# Patient Record
Sex: MALE | Race: BLACK OR AFRICAN AMERICAN | NOT HISPANIC OR LATINO | Employment: OTHER | URBAN - METROPOLITAN AREA
[De-identification: names, ages, dates, MRNs, and addresses within clinical notes are randomized per-mention and may not be internally consistent; named-entity substitution may affect disease eponyms.]

---

## 2020-11-19 ENCOUNTER — HOSPITAL ENCOUNTER (OUTPATIENT)
Facility: OTHER | Age: 80
Discharge: HOME | End: 2020-11-21
Attending: EMERGENCY MEDICINE | Admitting: HOSPITALIST
Payer: MEDICARE

## 2020-11-19 DIAGNOSIS — R29.6 FREQUENT FALLS: ICD-10-CM

## 2020-11-19 DIAGNOSIS — I10 ESSENTIAL HYPERTENSION: ICD-10-CM

## 2020-11-19 DIAGNOSIS — R54 AGE-RELATED PHYSICAL DEBILITY: ICD-10-CM

## 2020-11-19 DIAGNOSIS — I24.9 ACS (ACUTE CORONARY SYNDROME): ICD-10-CM

## 2020-11-19 DIAGNOSIS — R79.89 ELEVATED TROPONIN: ICD-10-CM

## 2020-11-19 DIAGNOSIS — R42 DIZZINESS: Primary | ICD-10-CM

## 2020-11-19 LAB
ANION GAP SERPL CALC-SCNC: 8 MMOL/L (ref 8–16)
BASOPHILS # BLD AUTO: 0.07 K/UL (ref 0–0.2)
BASOPHILS NFR BLD: 0.7 % (ref 0–1.9)
BUN SERPL-MCNC: 13 MG/DL (ref 8–23)
CALCIUM SERPL-MCNC: 9.4 MG/DL (ref 8.7–10.5)
CHLORIDE SERPL-SCNC: 105 MMOL/L (ref 95–110)
CO2 SERPL-SCNC: 29 MMOL/L (ref 23–29)
CREAT SERPL-MCNC: 1 MG/DL (ref 0.5–1.4)
CTP QC/QA: YES
DIFFERENTIAL METHOD: ABNORMAL
EOSINOPHIL # BLD AUTO: 0 K/UL (ref 0–0.5)
EOSINOPHIL NFR BLD: 0.3 % (ref 0–8)
ERYTHROCYTE [DISTWIDTH] IN BLOOD BY AUTOMATED COUNT: 12.2 % (ref 11.5–14.5)
EST. GFR  (AFRICAN AMERICAN): >60 ML/MIN/1.73 M^2
EST. GFR  (NON AFRICAN AMERICAN): >60 ML/MIN/1.73 M^2
GLUCOSE SERPL-MCNC: 189 MG/DL (ref 70–110)
HCT VFR BLD AUTO: 41.7 % (ref 40–54)
HGB BLD-MCNC: 13.4 G/DL (ref 14–18)
IMM GRANULOCYTES # BLD AUTO: 0.03 K/UL (ref 0–0.04)
IMM GRANULOCYTES NFR BLD AUTO: 0.3 % (ref 0–0.5)
LYMPHOCYTES # BLD AUTO: 1.6 K/UL (ref 1–4.8)
LYMPHOCYTES NFR BLD: 15.7 % (ref 18–48)
MCH RBC QN AUTO: 29.6 PG (ref 27–31)
MCHC RBC AUTO-ENTMCNC: 32.1 G/DL (ref 32–36)
MCV RBC AUTO: 92 FL (ref 82–98)
MONOCYTES # BLD AUTO: 0.9 K/UL (ref 0.3–1)
MONOCYTES NFR BLD: 9.1 % (ref 4–15)
NEUTROPHILS # BLD AUTO: 7.6 K/UL (ref 1.8–7.7)
NEUTROPHILS NFR BLD: 73.9 % (ref 38–73)
NRBC BLD-RTO: 0 /100 WBC
PLATELET # BLD AUTO: 323 K/UL (ref 150–350)
PMV BLD AUTO: 10.3 FL (ref 9.2–12.9)
POTASSIUM SERPL-SCNC: 3.7 MMOL/L (ref 3.5–5.1)
RBC # BLD AUTO: 4.53 M/UL (ref 4.6–6.2)
SARS-COV-2 RDRP RESP QL NAA+PROBE: NEGATIVE
SODIUM SERPL-SCNC: 142 MMOL/L (ref 136–145)
T4 FREE SERPL-MCNC: 0.92 NG/DL (ref 0.71–1.51)
TROPONIN I SERPL DL<=0.01 NG/ML-MCNC: 0.05 NG/ML (ref 0–0.03)
TSH SERPL DL<=0.005 MIU/L-ACNC: 0.34 UIU/ML (ref 0.4–4)
WBC # BLD AUTO: 10.31 K/UL (ref 3.9–12.7)

## 2020-11-19 PROCEDURE — 84439 ASSAY OF FREE THYROXINE: CPT

## 2020-11-19 PROCEDURE — 84484 ASSAY OF TROPONIN QUANT: CPT

## 2020-11-19 PROCEDURE — 93005 ELECTROCARDIOGRAM TRACING: CPT

## 2020-11-19 PROCEDURE — 93010 EKG 12-LEAD: ICD-10-PCS | Mod: ,,, | Performed by: INTERNAL MEDICINE

## 2020-11-19 PROCEDURE — 99220 PR INITIAL OBSERVATION CARE,LEVL III: ICD-10-PCS | Mod: ,,, | Performed by: NURSE PRACTITIONER

## 2020-11-19 PROCEDURE — 93010 ELECTROCARDIOGRAM REPORT: CPT | Mod: ,,, | Performed by: INTERNAL MEDICINE

## 2020-11-19 PROCEDURE — U0002 COVID-19 LAB TEST NON-CDC: HCPCS | Performed by: EMERGENCY MEDICINE

## 2020-11-19 PROCEDURE — 84443 ASSAY THYROID STIM HORMONE: CPT

## 2020-11-19 PROCEDURE — 99285 EMERGENCY DEPT VISIT HI MDM: CPT | Mod: 25

## 2020-11-19 PROCEDURE — G0378 HOSPITAL OBSERVATION PER HR: HCPCS

## 2020-11-19 PROCEDURE — 85025 COMPLETE CBC W/AUTO DIFF WBC: CPT

## 2020-11-19 PROCEDURE — 80048 BASIC METABOLIC PNL TOTAL CA: CPT

## 2020-11-19 PROCEDURE — 99220 PR INITIAL OBSERVATION CARE,LEVL III: CPT | Mod: ,,, | Performed by: NURSE PRACTITIONER

## 2020-11-19 PROCEDURE — 94761 N-INVAS EAR/PLS OXIMETRY MLT: CPT

## 2020-11-19 RX ORDER — SODIUM CHLORIDE 0.9 % (FLUSH) 0.9 %
10 SYRINGE (ML) INJECTION
Status: DISCONTINUED | OUTPATIENT
Start: 2020-11-19 | End: 2020-11-21 | Stop reason: HOSPADM

## 2020-11-19 RX ORDER — TALC
6 POWDER (GRAM) TOPICAL NIGHTLY PRN
Status: DISCONTINUED | OUTPATIENT
Start: 2020-11-19 | End: 2020-11-21 | Stop reason: HOSPADM

## 2020-11-19 RX ORDER — ACETAMINOPHEN 325 MG/1
650 TABLET ORAL EVERY 4 HOURS PRN
Status: DISCONTINUED | OUTPATIENT
Start: 2020-11-19 | End: 2020-11-21 | Stop reason: HOSPADM

## 2020-11-19 RX ORDER — ONDANSETRON 8 MG/1
8 TABLET, ORALLY DISINTEGRATING ORAL EVERY 8 HOURS PRN
Status: DISCONTINUED | OUTPATIENT
Start: 2020-11-19 | End: 2020-11-21 | Stop reason: HOSPADM

## 2020-11-19 RX ORDER — HEPARIN SODIUM 5000 [USP'U]/ML
5000 INJECTION, SOLUTION INTRAVENOUS; SUBCUTANEOUS EVERY 8 HOURS
Status: DISCONTINUED | OUTPATIENT
Start: 2020-11-19 | End: 2020-11-20

## 2020-11-19 RX ORDER — SODIUM CHLORIDE 0.9 % (FLUSH) 0.9 %
10 SYRINGE (ML) INJECTION
Status: DISCONTINUED | OUTPATIENT
Start: 2020-11-19 | End: 2020-11-20

## 2020-11-19 NOTE — ED TRIAGE NOTES
Pt to ed c/o dizziness that has been constant for the last few weeks. He denies taking any recent new medications. Pt denies any n/v/d, fever nor chills. Pt AAOx4, resp pattern even and non labored.

## 2020-11-19 NOTE — ED NOTES
13:48 lying 154/77 B/P  20 Resp  80 Pulse    13:50  Sitting  143/70  20 Resp     80 pulse    13:52 standing   150/76  12 resp   82 pulse

## 2020-11-19 NOTE — ED PROVIDER NOTES
"Encounter Date: 11/19/2020    SCRIBE #1 NOTE: I, Christian Naranjo, am scribing for, and in the presence of, Dr. Townsend.       History     Chief Complaint   Patient presents with    Dizziness     +intermittent dizziness x1 month. family members report frequent falls. pt reports right rib pain and "heart racing at times" pt denies fever, chills,n/v/d, sob, cp      Time seen by provider: 2:20 PM    This is a 80 y.o. male who presents with dizziness. Pt reports for over 3 weeks he has been intermittently experiencing dizziness when standing from a seated position. He also notes multiple episodes of feeling like his heart is racing. Per his granddaughter, he has had several falls over the last 2 to 3 weeks due to his dizzy spells with one fall causing rib trauma.. He states he finally came to the ED today because his daughter insisted that he be evaluated for his symptoms. Pt says he does not require walking assistance and ambulates well with occasional moments of feeling off-balance. He denies HA, CP, SOB, or N/V. Denies any medical history. Pt reports he has been eating less over the last few weeks due to dental pain. He has no other complaints today.    The history is provided by the patient, medical records and a relative.     Review of patient's allergies indicates:  No Known Allergies  No past medical history on file.  No past surgical history on file.  No family history on file.  Social History     Tobacco Use    Smoking status: Not on file   Substance Use Topics    Alcohol use: Not on file    Drug use: Not on file     Review of Systems   Constitutional: Negative for chills and fever.   HENT: Negative for congestion and sore throat.    Eyes: Negative for visual disturbance.   Respiratory: Negative for cough and shortness of breath.    Cardiovascular: Positive for palpitations. Negative for chest pain.   Gastrointestinal: Negative for nausea and vomiting.   Genitourinary: Negative for decreased urine volume, " dysuria and frequency.   Musculoskeletal: Negative for neck pain.   Skin: Negative for rash and wound.   Neurological: Positive for dizziness. Negative for weakness, numbness and headaches.   Psychiatric/Behavioral: Negative for behavioral problems and confusion.       Physical Exam     Initial Vitals [11/19/20 1330]   BP Pulse Resp Temp SpO2   132/79 93 18 98.3 °F (36.8 °C) 97 %      MAP       --         Physical Exam    Nursing note and vitals reviewed.  Constitutional: He appears well-developed and well-nourished. No distress.   HENT:   Head: Normocephalic and atraumatic.   Right Ear: External ear normal.   Left Ear: External ear normal.   Eyes: Conjunctivae and EOM are normal. Right eye exhibits no discharge. Left eye exhibits no discharge.   Neck: Normal range of motion.   Cardiovascular: Normal rate, regular rhythm and normal heart sounds. Exam reveals no gallop and no friction rub.    No murmur heard.  Pulmonary/Chest: Effort normal and breath sounds normal. No respiratory distress. He has no wheezes. He has no rales.   Abdominal: Normal appearance.   Neurological: He is alert and oriented to person, place, and time. He has normal strength. No cranial nerve deficit or sensory deficit. He displays a negative Romberg sign. Coordination and gait normal.   Skin: Skin is warm and dry. No rash noted. No pallor.   Psychiatric: He has a normal mood and affect. His behavior is normal. Thought content normal.         ED Course   Procedures  Labs Reviewed   BASIC METABOLIC PANEL - Abnormal; Notable for the following components:       Result Value    Glucose 189 (*)     All other components within normal limits   CBC W/ AUTO DIFFERENTIAL - Abnormal; Notable for the following components:    RBC 4.53 (*)     Hemoglobin 13.4 (*)     Gran % 73.9 (*)     Lymph % 15.7 (*)     All other components within normal limits   TROPONIN I - Abnormal; Notable for the following components:    Troponin I 0.049 (*)     All other components  within normal limits   TSH - Abnormal; Notable for the following components:    TSH 0.345 (*)     All other components within normal limits   T4, FREE   SARS-COV-2 RDRP GENE     EKG Readings: (Independently Interpreted)   Rhythm: Normal Sinus Rhythm. Heart Rate: 83.   Normal interval. Narrow QRS. T-wave inversion in 1 and Avl. Lateral ischemic findings. No other ST/T wave abnormalities.       Imaging Results          X-Ray Chest AP Portable (Final result)  Result time 11/19/20 18:12:59    Final result by Meir Alberto MD (11/19/20 18:12:59)                 Impression:      Elevation of the left hemidiaphragm with mild left basilar atelectasis.  Otherwise no acute cardiopulmonary process identified.      Electronically signed by: Meir Alberto MD  Date:    11/19/2020  Time:    18:12             Narrative:    EXAMINATION:  XR CHEST AP PORTABLE    CLINICAL HISTORY:  Dizziness and giddiness    TECHNIQUE:  Single frontal view of the chest was performed.    COMPARISON:  None    FINDINGS:  Cardiac silhouette is normal in size.  There is elevation of the left hemidiaphragm with mild left basilar atelectasis.  Right lung is expanded.  No evidence of focal consolidative process, pneumothorax, or significant pleural effusion.  No acute osseous abnormality identified.                               MRI Brain Without Contrast (Final result)  Result time 11/19/20 18:10:40    Final result by Meir Alberto MD (11/19/20 18:10:40)                 Impression:      1. No acute intracranial abnormalities identified.  No evidence of acute infarction.  2. Generalized cerebral volume loss with extensive chronic white matter disease.  3. Scattered foci of gradient susceptibility which may reflect remote microhemorrhage or amyloid angiopathy.      Electronically signed by: Meir Alberto MD  Date:    11/19/2020  Time:    18:10             Narrative:    EXAMINATION:  MRI BRAIN WITHOUT CONTRAST    CLINICAL HISTORY:  Dizziness,  persistent/recurrent, cardiac or vascular cause suspected;    TECHNIQUE:  Multiplanar multisequence MR imaging of the brain was performed without contrast.    COMPARISON:  MRI brain from July 2012.    FINDINGS:  There is generalized cerebral volume loss with extensive chronic white matter disease.  No diffusion signal abnormality to suggest acute infarction.  There is stable prominence of the ventricular system which may be secondary to compensatory enlargement from degree of volume loss.  No acute intracranial hemorrhage or extra-axial fluid collection seen.  Scattered foci of gradient susceptibility are seen which may reflect remote microhemorrhage or amyloid angiopathy.  No mass or mass effect.  Flow voids are normal in appearance.    Visualized paranasal sinuses and mastoid air cells are clear.  Orbits appear normal.                                 Medical Decision Making:   History:   Old Medical Records: I decided to obtain old medical records.  Initial Assessment:   80-year-old male with no past medical history presents with intermittent dizziness over last 3-4 weeks.  Differential could include peripheral versus central vertigo.  Also acute coronary syndrome, acute kidney injury, electrolyte abnormalities.  He currently is asymptomatic.  No focal neurological deficits.  Will send for an MRI based on age and get labs and observed.  Independently Interpreted Test(s):   I have ordered and independently interpreted EKG Reading(s) - see prior notes  Clinical Tests:   Lab Tests: Ordered and Reviewed  Radiological Study: Ordered and Reviewed  Medical Tests: Ordered and Reviewed            Scribe Attestation:   Scribe #1: I performed the above scribed service and the documentation accurately describes the services I performed. I attest to the accuracy of the note.    Attending Attestation:           Physician Attestation for Scribe:  Physician Attestation Statement for Scribe #1: I, Dr. Townsend, reviewed  documentation, as scribed by Christian Naranjo in my presence, and it is both accurate and complete.                 ED Course as of Nov 19 1908   Thu Nov 19, 2020   1449 No significant abnormalities.   CBC auto differential(!) [SM]   1516 Elevation of his troponin.   Troponin I(!): 0.049 [SM]   1518 No significant abnormalities     Basic metabolic panel(!) [SM]   1544 Normal   Free T4: 0.92 [SM]   1823 MRI reviewed showing no acute abnormality.  Will discuss with Hospital Medicine for further observation for evaluation of acute coronary syndrome.    []   1840 I discussed the results with the patient as well as his daughter was at bedside.  Also discussed my recommendation of admission for observation.  They agree with plan of care.  I spoke with Hospital Medicine and will admit to Dr. Montero.    This is the extent to the patients complaints today here in the emergency department.    []      ED Course User Index  [SM] Reginald Townsend DO            Clinical Impression:       ICD-10-CM ICD-9-CM   1. ACS (acute coronary syndrome)  I24.9 411.1   2. Dizziness  R42 780.4                          ED Disposition Condition    Observation                             Reginald Townsend DO  11/19/20 1908

## 2020-11-20 PROBLEM — I10 ESSENTIAL HYPERTENSION: Status: ACTIVE | Noted: 2020-11-20

## 2020-11-20 PROBLEM — R54 AGE-RELATED PHYSICAL DEBILITY: Status: ACTIVE | Noted: 2020-11-20

## 2020-11-20 PROBLEM — R29.6 FREQUENT FALLS: Status: ACTIVE | Noted: 2020-11-20

## 2020-11-20 LAB
25(OH)D3+25(OH)D2 SERPL-MCNC: 26 NG/ML (ref 30–96)
ALBUMIN SERPL BCP-MCNC: 3.5 G/DL (ref 3.5–5.2)
ALP SERPL-CCNC: 69 U/L (ref 55–135)
ALT SERPL W/O P-5'-P-CCNC: 10 U/L (ref 10–44)
ANION GAP SERPL CALC-SCNC: 6 MMOL/L (ref 8–16)
ASCENDING AORTA: 2.97 CM
AST SERPL-CCNC: 21 U/L (ref 10–40)
AV INDEX (PROSTH): 0.73
AV MEAN GRADIENT: 5 MMHG
AV PEAK GRADIENT: 9 MMHG
AV REGURGITATION PRESSURE HALF TIME: 915 MS
AV VALVE AREA: 2.91 CM2
AV VELOCITY RATIO: 0.64
BASOPHILS # BLD AUTO: 0.06 K/UL (ref 0–0.2)
BASOPHILS NFR BLD: 0.6 % (ref 0–1.9)
BILIRUB SERPL-MCNC: 0.9 MG/DL (ref 0.1–1)
BNP SERPL-MCNC: 290 PG/ML (ref 0–99)
BSA FOR ECHO PROCEDURE: 1.79 M2
BUN SERPL-MCNC: 10 MG/DL (ref 8–23)
CALCIUM SERPL-MCNC: 8.8 MG/DL (ref 8.7–10.5)
CHLORIDE SERPL-SCNC: 105 MMOL/L (ref 95–110)
CO2 SERPL-SCNC: 32 MMOL/L (ref 23–29)
CREAT SERPL-MCNC: 0.8 MG/DL (ref 0.5–1.4)
CV ECHO LV RWT: 0.54 CM
DIFFERENTIAL METHOD: ABNORMAL
DOP CALC AO PEAK VEL: 1.49 M/S
DOP CALC AO VTI: 26.97 CM
DOP CALC LVOT AREA: 4 CM2
DOP CALC LVOT DIAMETER: 2.26 CM
DOP CALC LVOT PEAK VEL: 0.95 M/S
DOP CALC LVOT STROKE VOLUME: 78.43 CM3
DOP CALCLVOT PEAK VEL VTI: 19.56 CM
E WAVE DECELERATION TIME: 296.44 MSEC
E/A RATIO: 0.76
E/E' RATIO: 20.75 M/S
ECHO LV POSTERIOR WALL: 1.26 CM (ref 0.6–1.1)
EOSINOPHIL # BLD AUTO: 0.1 K/UL (ref 0–0.5)
EOSINOPHIL NFR BLD: 1.5 % (ref 0–8)
ERYTHROCYTE [DISTWIDTH] IN BLOOD BY AUTOMATED COUNT: 12 % (ref 11.5–14.5)
EST. GFR  (AFRICAN AMERICAN): >60 ML/MIN/1.73 M^2
EST. GFR  (NON AFRICAN AMERICAN): >60 ML/MIN/1.73 M^2
ESTIMATED AVG GLUCOSE: 88 MG/DL (ref 68–131)
FOLATE SERPL-MCNC: 14 NG/ML (ref 4–24)
FRACTIONAL SHORTENING: 23 % (ref 28–44)
GLUCOSE SERPL-MCNC: 104 MG/DL (ref 70–110)
HBA1C MFR BLD HPLC: 4.7 % (ref 4–5.6)
HCT VFR BLD AUTO: 40.2 % (ref 40–54)
HGB BLD-MCNC: 12.8 G/DL (ref 14–18)
IMM GRANULOCYTES # BLD AUTO: 0.01 K/UL (ref 0–0.04)
IMM GRANULOCYTES NFR BLD AUTO: 0.1 % (ref 0–0.5)
INTERVENTRICULAR SEPTUM: 1.28 CM (ref 0.6–1.1)
IVRT: 140.48 MSEC
LA MAJOR: 5.22 CM
LA MINOR: 5.4 CM
LA WIDTH: 4.07 CM
LEFT ATRIUM SIZE: 3.67 CM
LEFT ATRIUM VOLUME INDEX MOD: 41.6 ML/M2
LEFT ATRIUM VOLUME INDEX: 37.9 ML/M2
LEFT ATRIUM VOLUME MOD: 74 CM3
LEFT ATRIUM VOLUME: 67.4 CM3
LEFT INTERNAL DIMENSION IN SYSTOLE: 3.62 CM (ref 2.1–4)
LEFT VENTRICLE DIASTOLIC VOLUME INDEX: 57.78 ML/M2
LEFT VENTRICLE DIASTOLIC VOLUME: 102.84 ML
LEFT VENTRICLE MASS INDEX: 130 G/M2
LEFT VENTRICLE SYSTOLIC VOLUME INDEX: 30.9 ML/M2
LEFT VENTRICLE SYSTOLIC VOLUME: 54.99 ML
LEFT VENTRICULAR INTERNAL DIMENSION IN DIASTOLE: 4.71 CM (ref 3.5–6)
LEFT VENTRICULAR MASS: 230.72 G
LV LATERAL E/E' RATIO: 16.6 M/S
LV SEPTAL E/E' RATIO: 27.67 M/S
LYMPHOCYTES # BLD AUTO: 1.7 K/UL (ref 1–4.8)
LYMPHOCYTES NFR BLD: 18 % (ref 18–48)
MAGNESIUM SERPL-MCNC: 1.9 MG/DL (ref 1.6–2.6)
MCH RBC QN AUTO: 29.4 PG (ref 27–31)
MCHC RBC AUTO-ENTMCNC: 31.8 G/DL (ref 32–36)
MCV RBC AUTO: 92 FL (ref 82–98)
MONOCYTES # BLD AUTO: 1.1 K/UL (ref 0.3–1)
MONOCYTES NFR BLD: 11.7 % (ref 4–15)
MV PEAK A VEL: 1.09 M/S
MV PEAK E VEL: 0.83 M/S
MV STENOSIS PRESSURE HALF TIME: 85.97 MS
MV VALVE AREA P 1/2 METHOD: 2.56 CM2
NEUTROPHILS # BLD AUTO: 6.3 K/UL (ref 1.8–7.7)
NEUTROPHILS NFR BLD: 68.1 % (ref 38–73)
NRBC BLD-RTO: 0 /100 WBC
PHOSPHATE SERPL-MCNC: 2.8 MG/DL (ref 2.7–4.5)
PISA MRMAX VEL: 0.04 M/S
PISA TR MAX VEL: 2.05 M/S
PLATELET # BLD AUTO: 282 K/UL (ref 150–350)
PMV BLD AUTO: 10.1 FL (ref 9.2–12.9)
POTASSIUM SERPL-SCNC: 3.5 MMOL/L (ref 3.5–5.1)
PROT SERPL-MCNC: 6.8 G/DL (ref 6–8.4)
PULM VEIN S/D RATIO: 1.4
PV PEAK D VEL: 0.3 M/S
PV PEAK S VEL: 0.42 M/S
PV PEAK VELOCITY: 0.78 CM/S
RA MAJOR: 3.81 CM
RA PRESSURE: 3 MMHG
RA WIDTH: 3.72 CM
RBC # BLD AUTO: 4.35 M/UL (ref 4.6–6.2)
RIGHT VENTRICULAR END-DIASTOLIC DIMENSION: 3.36 CM
RV TISSUE DOPPLER FREE WALL SYSTOLIC VELOCITY 1 (APICAL 4 CHAMBER VIEW): 11.78 CM/S
SINUS: 3.11 CM
SODIUM SERPL-SCNC: 143 MMOL/L (ref 136–145)
STJ: 2.95 CM
TDI LATERAL: 0.05 M/S
TDI SEPTAL: 0.03 M/S
TDI: 0.04 M/S
TR MAX PG: 17 MMHG
TRICUSPID ANNULAR PLANE SYSTOLIC EXCURSION: 2.29 CM
TROPONIN I SERPL DL<=0.01 NG/ML-MCNC: 0.04 NG/ML (ref 0–0.03)
TROPONIN I SERPL DL<=0.01 NG/ML-MCNC: 0.05 NG/ML (ref 0–0.03)
TV REST PULMONARY ARTERY PRESSURE: 20 MMHG
VIT B12 SERPL-MCNC: <146 PG/ML (ref 210–950)
WBC # BLD AUTO: 9.31 K/UL (ref 3.9–12.7)

## 2020-11-20 PROCEDURE — 82746 ASSAY OF FOLIC ACID SERUM: CPT

## 2020-11-20 PROCEDURE — 80053 COMPREHEN METABOLIC PANEL: CPT

## 2020-11-20 PROCEDURE — 84100 ASSAY OF PHOSPHORUS: CPT

## 2020-11-20 PROCEDURE — 97162 PT EVAL MOD COMPLEX 30 MIN: CPT

## 2020-11-20 PROCEDURE — 94761 N-INVAS EAR/PLS OXIMETRY MLT: CPT

## 2020-11-20 PROCEDURE — 36415 COLL VENOUS BLD VENIPUNCTURE: CPT

## 2020-11-20 PROCEDURE — 85025 COMPLETE CBC W/AUTO DIFF WBC: CPT

## 2020-11-20 PROCEDURE — 83735 ASSAY OF MAGNESIUM: CPT

## 2020-11-20 PROCEDURE — 63600175 PHARM REV CODE 636 W HCPCS: Performed by: NURSE PRACTITIONER

## 2020-11-20 PROCEDURE — 82306 VITAMIN D 25 HYDROXY: CPT

## 2020-11-20 PROCEDURE — G0378 HOSPITAL OBSERVATION PER HR: HCPCS

## 2020-11-20 PROCEDURE — 83036 HEMOGLOBIN GLYCOSYLATED A1C: CPT

## 2020-11-20 PROCEDURE — 82607 VITAMIN B-12: CPT

## 2020-11-20 PROCEDURE — 84484 ASSAY OF TROPONIN QUANT: CPT

## 2020-11-20 PROCEDURE — 99226 PR SUBSEQUENT OBSERVATION CARE,LEVEL III: ICD-10-PCS | Mod: ,,, | Performed by: NURSE PRACTITIONER

## 2020-11-20 PROCEDURE — 83880 ASSAY OF NATRIURETIC PEPTIDE: CPT

## 2020-11-20 PROCEDURE — 97165 OT EVAL LOW COMPLEX 30 MIN: CPT

## 2020-11-20 PROCEDURE — 99226 PR SUBSEQUENT OBSERVATION CARE,LEVEL III: CPT | Mod: ,,, | Performed by: NURSE PRACTITIONER

## 2020-11-20 PROCEDURE — 25000003 PHARM REV CODE 250: Performed by: NURSE PRACTITIONER

## 2020-11-20 RX ORDER — CHOLECALCIFEROL (VITAMIN D3) 25 MCG
1000 TABLET ORAL DAILY
Status: DISCONTINUED | OUTPATIENT
Start: 2020-11-20 | End: 2020-11-21 | Stop reason: HOSPADM

## 2020-11-20 RX ORDER — ENOXAPARIN SODIUM 100 MG/ML
40 INJECTION SUBCUTANEOUS EVERY 24 HOURS
Status: DISCONTINUED | OUTPATIENT
Start: 2020-11-20 | End: 2020-11-21 | Stop reason: HOSPADM

## 2020-11-20 RX ORDER — AMLODIPINE BESYLATE 5 MG/1
5 TABLET ORAL DAILY
Status: DISCONTINUED | OUTPATIENT
Start: 2020-11-20 | End: 2020-11-21 | Stop reason: HOSPADM

## 2020-11-20 RX ORDER — LANOLIN ALCOHOL/MO/W.PET/CERES
1000 CREAM (GRAM) TOPICAL DAILY
Status: DISCONTINUED | OUTPATIENT
Start: 2020-11-20 | End: 2020-11-21 | Stop reason: HOSPADM

## 2020-11-20 RX ADMIN — AMLODIPINE BESYLATE 5 MG: 5 TABLET ORAL at 04:11

## 2020-11-20 RX ADMIN — ENOXAPARIN SODIUM 40 MG: 40 INJECTION SUBCUTANEOUS at 04:11

## 2020-11-20 RX ADMIN — Medication 1000 UNITS: at 06:11

## 2020-11-20 RX ADMIN — CYANOCOBALAMIN TAB 1000 MCG 1000 MCG: 1000 TAB at 06:11

## 2020-11-20 NOTE — H&P
"[facility] Hospital Medicine  History & Physical    Patient Name: Gregorio Bishop  MRN: [id number]  Admission Date: 11/19/2020  Attending Physician: Memo Montero MD   Primary Care Provider: Renaldo Molina MD         Patient information was obtained from patient, past medical records and ER records.     Subjective:     Principal Problem:Dizziness    Chief Complaint:   Chief Complaint   Patient presents with    Dizziness     +intermittent dizziness x1 month. family members report frequent falls. pt reports right rib pain and "heart racing at times" pt denies fever, chills,n/v/d, sob, cp         HPI: The patient is a 80 y.o. male who presents with dizziness with occasional shortness of breath. Pt reports for over 3 weeks he has been intermittently experiencing dizziness when standing from a seated position. He also notes multiple episodes of feeling like his heart is racing. Per his granddaughter, he has had several falls over the last 2 to 3 weeks due to his dizzy spells with one fall causing rib trauma.. He states he finally came today because his daughter insisted that he be evaluated for his symptoms. Pt says he does not require walking assistance and ambulates well with occasional moments of feeling off-balance. He denies HA, CP, or N/V.  The patient has a mildly elevated troponin and will be admitted for further management and workup.    No past medical history on file.    No past surgical history on file.    Review of patient's allergies indicates:  No Known Allergies    No current facility-administered medications on file prior to encounter.      No current outpatient medications on file prior to encounter.     Family History     Family history is unknown by patient.        Tobacco Use    Smoking status: Not on file   Substance and Sexual Activity    Alcohol use: Not on file    Drug use: Not on file    Sexual activity: Not on file     Review of Systems   Constitutional: Positive for " activity change and fatigue. Negative for appetite change and fever.   HENT: Negative for congestion, ear pain and postnasal drip.    Eyes: Negative for discharge.   Respiratory: Negative for apnea, shortness of breath and wheezing.    Cardiovascular: Negative for chest pain and leg swelling.   Gastrointestinal: Negative for abdominal distention, abdominal pain, nausea and vomiting.   Endocrine: Negative for polydipsia, polyphagia and polyuria.   Genitourinary: Negative for difficulty urinating, flank pain, frequency, hematuria and urgency.   Musculoskeletal: Negative for arthralgias and joint swelling.   Skin: Negative for pallor and rash.   Allergic/Immunologic: Negative for environmental allergies and food allergies.   Neurological: Positive for dizziness and weakness. Negative for speech difficulty, light-headedness and headaches.   Hematological: Does not bruise/bleed easily.   Psychiatric/Behavioral: Negative for agitation.     Objective:     Vital Signs (Most Recent):  Temp: 98.3 °F (36.8 °C) (11/19/20 1330)  Pulse: (OFF TELE) (11/19/20 2200)  Resp: 18 (11/19/20 1330)  BP: (!) 161/81 (11/19/20 1947)  SpO2: 97 % (11/19/20 1947) Vital Signs (24h Range):  Temp:  [98.3 °F (36.8 °C)] 98.3 °F (36.8 °C)  Pulse:  [66-93] 66  Resp:  [18] 18  SpO2:  [96 %-97 %] 97 %  BP: (127-161)/(77-81) 161/81     Weight: 83.9 kg (185 lb)  There is no height or weight on file to calculate BMI.    Physical Exam  Constitutional:       Appearance: Normal appearance. He is well-developed.   HENT:      Head: Normocephalic.   Eyes:      Conjunctiva/sclera: Conjunctivae normal.   Neck:      Musculoskeletal: Normal range of motion and neck supple.   Cardiovascular:      Rate and Rhythm: Normal rate and regular rhythm.      Pulses:           Radial pulses are 1+ on the right side and 1+ on the left side.      Heart sounds: Normal heart sounds.   Pulmonary:      Effort: Pulmonary effort is normal.      Breath sounds: Examination of the  right-lower field reveals decreased breath sounds. Examination of the left-lower field reveals decreased breath sounds. Decreased breath sounds present.   Abdominal:      General: Bowel sounds are normal. There is no distension.      Palpations: Abdomen is soft.      Tenderness: There is no abdominal tenderness.   Musculoskeletal: Normal range of motion.   Skin:     General: Skin is warm and dry.   Neurological:      Mental Status: He is alert and oriented to person, place, and time. Mental status is at baseline.      GCS: GCS eye subscore is 3. GCS verbal subscore is 5. GCS motor subscore is 6.      Motor: Motor function is intact.   Psychiatric:         Mood and Affect: Mood normal.         Speech: Speech normal.         Behavior: Behavior normal.             Significant Labs:   CBC:   Recent Labs   Lab 11/19/20  1345   WBC 10.31   HGB 13.4*   HCT 41.7        CMP:   Recent Labs   Lab 11/19/20  1345      K 3.7      CO2 29   *   BUN 13   CREATININE 1.0   CALCIUM 9.4   ANIONGAP 8   EGFRNONAA >60       Significant Imaging: I have reviewed all pertinent imaging results/findings within the past 24 hours.    Assessment/Plan:     * Dizziness  MRI-  No acute intracranial abnormalities identified.  No evidence of acute infarction.  Generalized cerebral volume loss with extensive chronic white matter disease.   Scattered foci of gradient susceptibility which may reflect remote microhemorrhage or amyloid angiopathy.   Orthostatics- negative    Echo        Elevated troponin  Troponin- .049; no chest pain    Trend Troponin  Cardiac monitoring  Consider Cardiology consult if troponin continues to elevate        VTE Risk Mitigation (From admission, onward)         Ordered     heparin (porcine) injection 5,000 Units  Every 8 hours      11/19/20 2127     Place sequential compression device  Until discontinued      11/19/20 2127     Place CHARLES hose  Until discontinued      11/19/20 2127     IP VTE HIGH RISK  PATIENT  Once      11/19/20 2127                   Seth Castanon NP  Department of Hospital Medicine   [facility]

## 2020-11-20 NOTE — ASSESSMENT & PLAN NOTE
- Recent falls due to generalized weakness and poor oral intake per wife  - No prior history of dementia  - Vitamine B12, folate and D3 pending  - TSH mildly decreased with normal free T4  - PT/OT evaluation

## 2020-11-20 NOTE — PLAN OF CARE
Pt in bed, no noted acute distress, no complaints of pain, no new needs addressed, avasys placed due to recent fall hx, no injuries or falls during shift, AAO X 4, pt is ambulatory, pt refused heparin, pt appeared to sleep in between nursing care, bed in low locked position, call light in reach, hourly rounds complete, will continue to assess

## 2020-11-20 NOTE — PROGRESS NOTES
"[facility] Hospital Medicine  Progress Note    Patient Name: Gregorio Bishop  MRN: [id number]  Patient Class: OP- Observation   Admission Date: 11/19/2020  Length of Stay: 0 days  Attending Physician: Memo Montero MD  Primary Care Provider: Renaldo Molina MD        Subjective:     Principal Problem:Dizziness        HPI:  Per LUCRETIA Castanon, NP:  The patient is a 80 y.o. male who presents with dizziness with occasional shortness of breath. Pt reports for over 3 weeks he has been intermittently experiencing dizziness when standing from a seated position. He also notes multiple episodes of feeling like his heart is racing. Per his granddaughter, he has had several falls over the last 2 to 3 weeks due to his dizzy spells with one fall causing rib trauma.. He states he finally came today because his daughter insisted that he be evaluated for his symptoms. Pt says he does not require walking assistance and ambulates well with occasional moments of feeling off-balance. He denies HA, CP, or N/V.  The patient has a mildly elevated troponin and will be admitted for further management and workup.    Overview/Hospital Course:  Gregorio Bishop 80 year old male without significant know medical history other than hypertension.  He presented to the [facility] ED evaluation of recent episodes of "dizziness."  Per patient's wife, the patient has recently had poor appetite with substantial weight loss (~ 30 lbs) over the past month. Over the past month, the patient has had some falls attempting to get out of bed.  Per wife, the patient's daughter visited the patient yesterday and felt the patient "did not look well" and decided to come to the ED for evaluation.  On arrival to the ED, the patient vital signs stable and patient alert and oriented.  Labs were essentially unremarkable with exception of mildly elevated troponin 0.049, TSH 0.345, Free T4 0.92.  MRI brain was without evidence of acute infarction, but " did show possible remote microhemorrages vs amyloid angiopathy; also noted generalized cerebral volume loss with extensive small vessel disease. He was admitted for Observation.  Troponin with mild elevation in flat patterb 0.049, 0.054, 0.042 and .  Echocardiogram with normal EF and grade I diastolic dysfunction.   The patient was not found to be orthostatic.   PT/OT evaluated the patient and reported patient had no episodes of dizziness and recommended home with outpatient physical therapy.  He does have hypertension and reports he is on amlodipine 5 mg daily, but states he does not always take this medication.     Interval History: No events overnight.  No further episodes of dizziness. Patient not orthostatic. Vit B12, folate and Vit D pending.  Working well with PT/OT who recommend outpatient therapy services    Review of Systems   Constitutional: Positive for activity change. Negative for appetite change, fatigue and fever.   HENT: Negative for congestion and trouble swallowing.    Eyes: Negative for discharge.   Respiratory: Negative for chest tightness and shortness of breath.    Cardiovascular: Negative for chest pain and palpitations.   Gastrointestinal: Negative for abdominal pain, nausea and vomiting.   Endocrine: Negative for polydipsia, polyphagia and polyuria.   Genitourinary: Negative for dysuria and urgency.   Musculoskeletal: Negative for arthralgias and myalgias.   Skin: Negative for pallor and wound.   Allergic/Immunologic: Negative for environmental allergies and food allergies.   Neurological: Negative for dizziness, speech difficulty, weakness, light-headedness and headaches.   Hematological: Does not bruise/bleed easily.   Psychiatric/Behavioral: Negative for confusion. The patient is not nervous/anxious.      Objective:     Vital Signs (Most Recent):  Temp: 98.2 °F (36.8 °C) (11/20/20 0441)  Pulse: (Abnormal) 56 (11/20/20 0800)  Resp: 16 (11/20/20 0441)  BP: (Abnormal) 161/76  (11/20/20 0441)  SpO2: 97 % (11/20/20 0441) Vital Signs (24h Range):  Temp:  [98.2 °F (36.8 °C)-98.3 °F (36.8 °C)] 98.2 °F (36.8 °C)  Pulse:  [55-93] 56  Resp:  [16-18] 16  SpO2:  [95 %-97 %] 97 %  BP: (127-161)/(76-88) 161/76     Weight: 69.1 kg (152 lb 5.4 oz)  Body mass index is 24.59 kg/m².    Intake/Output Summary (Last 24 hours) at 11/20/2020 0825  Last data filed at 11/20/2020 0425  Gross per 24 hour   Intake no documentation   Output 650 ml   Net -650 ml      Physical Exam  Constitutional:       Appearance: Normal appearance. He is well-developed.   HENT:      Head: Normocephalic.      Nose: Nose normal. No congestion.      Mouth/Throat:      Mouth: Mucous membranes are moist.      Pharynx: Oropharynx is clear.   Eyes:      Conjunctiva/sclera: Conjunctivae normal.      Pupils: Pupils are equal, round, and reactive to light.   Neck:      Musculoskeletal: Normal range of motion and neck supple.   Cardiovascular:      Rate and Rhythm: Regular rhythm. Bradycardia present.      Pulses:           Radial pulses are 1+ on the right side and 1+ on the left side.      Heart sounds: Normal heart sounds.   Pulmonary:      Effort: Pulmonary effort is normal.      Breath sounds: Examination of the right-lower field reveals decreased breath sounds. Examination of the left-lower field reveals decreased breath sounds. Decreased breath sounds present.   Abdominal:      General: Bowel sounds are normal.      Palpations: Abdomen is soft.      Tenderness: There is no abdominal tenderness.   Musculoskeletal: Normal range of motion.   Skin:     General: Skin is warm and dry.   Neurological:      Mental Status: He is alert and oriented to person, place, and time. Mental status is at baseline.      Sensory: Sensation is intact.      Motor: Motor function is intact.   Psychiatric:         Attention and Perception: Attention normal.         Mood and Affect: Mood normal.         Speech: Speech normal.         Significant Labs:   CBC:    Recent Labs   Lab 11/19/20  1345 11/20/20  0426   WBC 10.31 9.31   HGB 13.4* 12.8*   HCT 41.7 40.2    282     CMP:   Recent Labs   Lab 11/19/20  1345 11/20/20  0426    143   K 3.7 3.5    105   CO2 29 32*   * 104   BUN 13 10   CREATININE 1.0 0.8   CALCIUM 9.4 8.8   PROT  --  6.8   ALBUMIN  --  3.5   BILITOT  --  0.9   ALKPHOS  --  69   AST  --  21   ALT  --  10   ANIONGAP 8 6*   EGFRNONAA >60 >60        Troponin:   Recent Labs   Lab 11/19/20  1345 11/20/20  0426   TROPONINI 0.049* 0.054*      All pertinent labs within the past 24 hours have been reviewed.    Significant Imaging: I have reviewed all pertinent imaging results/findings within the past 24 hours.     X-Ray Chest AP Portable  Narrative: EXAMINATION:  XR CHEST AP PORTABLE    CLINICAL HISTORY:  Dizziness and giddiness    TECHNIQUE:  Single frontal view of the chest was performed.    COMPARISON:  None    FINDINGS:  Cardiac silhouette is normal in size.  There is elevation of the left hemidiaphragm with mild left basilar atelectasis.  Right lung is expanded.  No evidence of focal consolidative process, pneumothorax, or significant pleural effusion.  No acute osseous abnormality identified.  Impression: Elevation of the left hemidiaphragm with mild left basilar atelectasis.  Otherwise no acute cardiopulmonary process identified.    Electronically signed by: Meir Alberto MD  Date:    11/19/2020  Time:    18:12  MRI Brain Without Contrast  Narrative: EXAMINATION:  MRI BRAIN WITHOUT CONTRAST    CLINICAL HISTORY:  Dizziness, persistent/recurrent, cardiac or vascular cause suspected;    TECHNIQUE:  Multiplanar multisequence MR imaging of the brain was performed without contrast.    COMPARISON:  MRI brain from July 2012.    FINDINGS:  There is generalized cerebral volume loss with extensive chronic white matter disease.  No diffusion signal abnormality to suggest acute infarction.  There is stable prominence of the ventricular system  which may be secondary to compensatory enlargement from degree of volume loss.  No acute intracranial hemorrhage or extra-axial fluid collection seen.  Scattered foci of gradient susceptibility are seen which may reflect remote microhemorrhage or amyloid angiopathy.  No mass or mass effect.  Flow voids are normal in appearance.    Visualized paranasal sinuses and mastoid air cells are clear.  Orbits appear normal.  Impression: 1. No acute intracranial abnormalities identified.  No evidence of acute infarction.  2. Generalized cerebral volume loss with extensive chronic white matter disease.  3. Scattered foci of gradient susceptibility which may reflect remote microhemorrhage or amyloid angiopathy.    Electronically signed by: Meir Alberto MD  Date:    11/19/2020  Time:    18:10        Assessment/Plan:      * Dizziness  Presented to [facility] with report of poor oral intake, frequent falls with episodes of dizziness patient reports not cause of falls  - MRI brain without acute processes to explain dizziness, but with generalized cererbral volume loss and remote microhemorrhages vs amyloid angiopathy  - Orthostatic vitals signs  - Monitor on telemetry for arrhthymia and echocardiogram pending  - PT/OT evaluation   - Fall precautions  - Vitamin B12, folate and Vit D levels pending            Essential hypertension  - Known history of hypertension with actual antihypertensive medication unknown to patient and wife; - Blood pressure reasonably controlled without medications since admission  - Monitor and await further information from family; treat as needed for SBP > 170 for now      Frequent falls  - Recent falls due to generalized weakness and poor oral intake per wife  - No prior history of dementia  - Vitamine B12, folate and D3 pending  - TSH mildly decreased with normal free T4  - PT/OT evaluation       Age-related physical debility  - Recent falls due to generalized weakness and poor oral intake per  wife  - PT/OT evaluation       Elevated troponin  Troponin- .049, 0.054 and final result pending     Trend Troponin  Cardiac monitoring  Consider Cardiology consult if troponin continues to elevate      VTE Risk Mitigation (From admission, onward)         Ordered     enoxaparin injection 40 mg  Every 24 hours      11/20/20 0823     Place sequential compression device  Until discontinued      11/19/20 2127     IP VTE HIGH RISK PATIENT  Once      11/19/20 2127                Discharge Planning   RASHAD:      Code Status: Full Code   Is the patient medically ready for discharge?: (No Documentation)    Reason for patient still in hospital: Other (specify) discharge in AM with family  Discharge Plan A: Home                  Parul Roche NP  Department of Hospital Medicine   [facility]

## 2020-11-20 NOTE — HPI
Per LUCRETIA Castanon NP:  The patient is a 80 y.o. male who presents with dizziness with occasional shortness of breath. Pt reports for over 3 weeks he has been intermittently experiencing dizziness when standing from a seated position. He also notes multiple episodes of feeling like his heart is racing. Per his granddaughter, he has had several falls over the last 2 to 3 weeks due to his dizzy spells with one fall causing rib trauma.. He states he finally came today because his daughter insisted that he be evaluated for his symptoms. Pt says he does not require walking assistance and ambulates well with occasional moments of feeling off-balance. He denies HA, CP, or N/V.  The patient has a mildly elevated troponin and will be admitted for further management and workup.

## 2020-11-20 NOTE — ASSESSMENT & PLAN NOTE
Troponin- .049; no chest pain    Trend Troponin  Cardiac monitoring  Consider Cardiology consult if troponin continues to elevate

## 2020-11-20 NOTE — PT/OT/SLP EVAL
"Physical Therapy Evaluation and Discharge Note    Patient Name:  Gregorio Bishop   MRN:  [id number]    Recommendations:     Discharge Recommendations:  home, outpatient PT   Discharge Equipment Recommendations: none   Barriers to discharge: None    Assessment:     Gregorio Bishop is a 80 y.o. male admitted with a medical diagnosis of Dizziness. .  At this time, patient is functioning at their prior level of function and does not require further acute PT services.     PT orders received. Evaluation completed. Pt is Independent for ambulation and transfers. Pt demonstrates normal gait pattern and normal balance. Pt expressed confidence in all aspects of his mobility. No acute PT needs identified at this time. Will d/c PT. Recommend discharge to outpatient PT if dizziness persists.    Recent Surgery: * No surgery found *      Plan:     During this hospitalization, patient does not require further acute PT services.  Please re-consult if situation changes.      Subjective     Chief Complaint: No complaint stated.   Patient/Family Comments/goals: Pt enjoyed hallway ambulation as he was happy to be out of his room.   Pain/Comfort:  · Pain Rating 1: 0/10    Patients cultural, spiritual, Islam conflicts given the current situation: no    Living Environment:  · Pt lives with his wife and son in a single level house w/ 13 steps to enter and bilateral handrails. Pt only able to use one handrail at a time as they are very far apart. Pt has a tub/shower.   · DME owned: None  · Upon discharge, patient will have assistance from wife and son.  Prior level of function:  · Ambulation: Independent  · ADL's: Independent  · IADLs: Independent  · Falls: Pt reports 3 falls in the last six months reporting he "wasn't steady" as the cause of his falls.  · Per medical chart, pt's granddaughter reports he has had several falls over the last 2-3 weeks due to his dizzy spells with one fall causing rib trauma.     Objective:     Communicated " with RN (Judy) prior to session.  Patient found supine with bed alarm, peripheral IV, telemetry([facility]) upon PT entry to room.    General Precautions: Standard, anti-coagulation medicine, fall   Orthopedic Precautions:N/A   Braces: N/A     Exams:  · Cognition:   · Patient is oriented to person, place, time, and situation.  · Pt follows approximately 100% of multiple-step commands.    · Mood: Pleasant and cooperative.  · Musculoskeletal:  · Posture:    · In sitting: WNL  · In standing: WNL  · LE ROM/Strength:   · R ROM: No deficits  · L ROM: No deficits  · R Strength:   · Hip flexion: 4-/5  · Knee extension: 4-/5  · Dorsiflexion: 5/5   · L Strength:   · Hip flexion: 4+/5  · Knee extension: 4-/5  · Dorsiflexion: 5/5   · Neuromuscular:  · Sensation: Intact to light touch bilateral LEs.   · Tone/Reflexes: No impairments identified with functional mobility. No formal testing performed.  · Coordination:  · Heel to shin: WNL  · Toe tapping: WNL  · Balance:   · Static sitting: Independent  · Dynamic sitting: Independent  · Static standing: Independent  · Dynamic standing: Independent  · Visual-vestibular: No impairments identified with functional mobility. No formal testing performed.  · Integument: Visible skin intact      Functional Mobility:  Bed Mobility:     Supine to Sit: independence  Pt denies dizziness when transitioning from supine > sit.   Sit to Supine: independence  Transfers:     Sit to Stand:  independence with no AD   Pt denies dizziness when transitioning from sit > stand.  Gait: Pt ambulated 150ft w/ no AD and [place].  Pt instructed to turn head from side to side and nod up and down during ambulation to test if these movements resulted in dizziness.   Pt reported no dizziness following head movements. Pt with no observed LOB or gait deviations.   Tinetti Total Score: 28  < 18 = High Risk of Falls, 19-23 = Moderate Risk of Falls, > 24 = Low Risk of Falls    AM-PAC 6 CLICK MOBILITY  Total  Score:24       Therapeutic Activities and Exercises:  PT educated patient re:   · PT plan of care/role of PT  · Fall and safety precautions  · Use of call light (don't get up without assistance)  Pt verbalized understanding     The patient is safe to transfer with RN assist.        AM-PAC 6 CLICK MOBILITY  Total Score:24     Patient left supine with all lines intact, call button in reach and bed alarm on.    GOALS:   Multidisciplinary Problems     Physical Therapy Goals     Not on file          Multidisciplinary Problems (Resolved)        Problem: Physical Therapy Goal    Goal Priority Disciplines Outcome Goal Variances Interventions   Physical Therapy Goal   (Resolved)     PT, PT/OT Met                     History:     Past Medical History:   Diagnosis Date    Hypertension        History reviewed. No pertinent surgical history.    Time Tracking:     PT Received On: 11/20/20  PT Start Time: 0921     PT Stop Time: 0935  PT Total Time (min): 14 min     Billable Minutes: Evaluation 14      AMI Greene  11/20/2020

## 2020-11-20 NOTE — ASSESSMENT & PLAN NOTE
[facility] with report of poor oral intake, frequent falls with episodes of dizziness patient reports not cause of falls  - MRI brain without acute processes to explain dizziness, but with generalized cererbral volume loss and remote microhemorrhages vs amyloid angiopathy  - Orthostatic vitals signs  - Monitor on telemetry for arrhthymia and echocardiogram pending  - PT/OT evaluation   - Fall precautions  - Vitamin B12, folate and Vit D levels pending

## 2020-11-20 NOTE — PLAN OF CARE
Problem: Occupational Therapy Goal  Goal: Occupational Therapy Goal  11/20/2020 1105 by ALEXANDRA Cox  Outcome: Ongoing, Progressing     OT evaluation complete with no major deficits identified with ADLs or mobility.  PTA pt reports being (I) with ADLs and mobility.  He is performing at baseline and does not require OT services in the acute care setting at this time.  OT discussed importance of taking time with activity, and recruiting help as needed to avoid engaging in potentially unsafe tasks.  Daughter in agreement with pt requiring some reinforcement.  No further therapy needs recommended upon d/c from acute care.    ALEXANDRA Lyons  11/20/2020

## 2020-11-20 NOTE — ASSESSMENT & PLAN NOTE
- Known history of hypertension with actual antihypertensive medication unknown to patient and wife; - Blood pressure reasonably controlled without medications since admission  - Monitor and await further information from family; treat as needed for SBP > 170 for now

## 2020-11-20 NOTE — ASSESSMENT & PLAN NOTE
Troponin- .049, 0.054 and final result pending     Trend Troponin  Cardiac monitoring  Consider Cardiology consult if troponin continues to elevate

## 2020-11-20 NOTE — HOSPITAL COURSE
"Gregorio Bishop 80 year old male without significant know medical history other than hypertension.  He presented to the [facility] ED evaluation of recent episodes of "dizziness."  Per patient's wife, the patient has recently had poor appetite with substantial weight loss (~ 30 lbs) over the past month. Over the past month, the patient has had some falls attempting to get out of bed.  Per wife, the patient's daughter visited the patient yesterday and felt the patient "did not look well" and decided to come to the ED for evaluation.  On arrival to the ED, the patient vital signs stable and patient alert and oriented.  Labs were essentially unremarkable with exception of mildly elevated troponin 0.049, TSH 0.345, Free T4 0.92.  MRI brain was without evidence of acute infarction, but did show possible remote microhemorrages vs amyloid angiopathy; also noted generalized cerebral volume loss with extensive small vessel disease. He was admitted for Observation.  Troponin with mild elevation in flat patterb 0.049, 0.054, 0.042 and .  Echocardiogram with normal EF and grade I diastolic dysfunction.   The patient was not found to be orthostatic.   PT/OT evaluated the patient and reported patient had no episodes of dizziness and recommended home with outpatient physical therapy.  He does have hypertension and reports he is on amlodipine 5 mg daily, but states he does not always take this medication.  I have discussed with the patient and family that the patient should keep a log of home blood pressures.  Additionally, I have recommended the patient establish care with Cardiology for possible outpatient testing for cardiac risk stratification and management of hypertension.  The patient's family states the patient does not have a history of dementia, but family has noted some changes to short term memory.  I have recommended the patient follow closely with his primary care provider for possible mental status testing " to determine baseline.  The patient and family requested discharge to home.  Diagnosis, plan of care and management were discussed with the patient and family who agreed with plan for discharge home with home health services.

## 2020-11-20 NOTE — PLAN OF CARE
Problem: Physical Therapy Goal  Goal: Physical Therapy Goal  Outcome: Met    PT orders received. Evaluation completed. Pt is Independent for ambulation and transfers. Pt demonstrates normal gait pattern and normal balance. Pt expressed confidence in all aspects of his mobility. No acute PT needs identified at this time. Will d/c PT. Recommend discharge to outpatient PT if dizziness persists.

## 2020-11-20 NOTE — SUBJECTIVE & OBJECTIVE
Interval History: No events overnight.  No further episodes of dizziness. Patient not orthostatic. Vit B12, folate and Vit D pending.  Working well with PT/OT who recommend outpatient therapy services    Review of Systems   Constitutional: Positive for activity change. Negative for appetite change, fatigue and fever.   HENT: Negative for congestion and trouble swallowing.    Eyes: Negative for discharge.   Respiratory: Negative for chest tightness and shortness of breath.    Cardiovascular: Negative for chest pain and palpitations.   Gastrointestinal: Negative for abdominal pain, nausea and vomiting.   Endocrine: Negative for polydipsia, polyphagia and polyuria.   Genitourinary: Negative for dysuria and urgency.   Musculoskeletal: Negative for arthralgias and myalgias.   Skin: Negative for pallor and wound.   Allergic/Immunologic: Negative for environmental allergies and food allergies.   Neurological: Negative for dizziness, speech difficulty, weakness, light-headedness and headaches.   Hematological: Does not bruise/bleed easily.   Psychiatric/Behavioral: Negative for confusion. The patient is not nervous/anxious.      Objective:     Vital Signs (Most Recent):  Temp: 98.2 °F (36.8 °C) (11/20/20 0441)  Pulse: (Abnormal) 56 (11/20/20 0800)  Resp: 16 (11/20/20 0441)  BP: (Abnormal) 161/76 (11/20/20 0441)  SpO2: 97 % (11/20/20 0441) Vital Signs (24h Range):  Temp:  [98.2 °F (36.8 °C)-98.3 °F (36.8 °C)] 98.2 °F (36.8 °C)  Pulse:  [55-93] 56  Resp:  [16-18] 16  SpO2:  [95 %-97 %] 97 %  BP: (127-161)/(76-88) 161/76     Weight: 69.1 kg (152 lb 5.4 oz)  Body mass index is 24.59 kg/m².    Intake/Output Summary (Last 24 hours) at 11/20/2020 0825  Last data filed at 11/20/2020 0425  Gross per 24 hour   Intake no documentation   Output 650 ml   Net -650 ml      Physical Exam  Constitutional:       Appearance: Normal appearance. He is well-developed.   HENT:      Head: Normocephalic.      Nose: Nose normal. No congestion.       Mouth/Throat:      Mouth: Mucous membranes are moist.      Pharynx: Oropharynx is clear.   Eyes:      Conjunctiva/sclera: Conjunctivae normal.      Pupils: Pupils are equal, round, and reactive to light.   Neck:      Musculoskeletal: Normal range of motion and neck supple.   Cardiovascular:      Rate and Rhythm: Regular rhythm. Bradycardia present.      Pulses:           Radial pulses are 1+ on the right side and 1+ on the left side.      Heart sounds: Normal heart sounds.   Pulmonary:      Effort: Pulmonary effort is normal.      Breath sounds: Examination of the right-lower field reveals decreased breath sounds. Examination of the left-lower field reveals decreased breath sounds. Decreased breath sounds present.   Abdominal:      General: Bowel sounds are normal.      Palpations: Abdomen is soft.      Tenderness: There is no abdominal tenderness.   Musculoskeletal: Normal range of motion.   Skin:     General: Skin is warm and dry.   Neurological:      Mental Status: He is alert and oriented to person, place, and time. Mental status is at baseline.      Sensory: Sensation is intact.      Motor: Motor function is intact.   Psychiatric:         Attention and Perception: Attention normal.         Mood and Affect: Mood normal.         Speech: Speech normal.         Significant Labs:   CBC:   Recent Labs   Lab 11/19/20  1345 11/20/20  0426   WBC 10.31 9.31   HGB 13.4* 12.8*   HCT 41.7 40.2    282     CMP:   Recent Labs   Lab 11/19/20  1345 11/20/20  0426    143   K 3.7 3.5    105   CO2 29 32*   * 104   BUN 13 10   CREATININE 1.0 0.8   CALCIUM 9.4 8.8   PROT  --  6.8   ALBUMIN  --  3.5   BILITOT  --  0.9   ALKPHOS  --  69   AST  --  21   ALT  --  10   ANIONGAP 8 6*   EGFRNONAA >60 >60        Troponin:   Recent Labs   Lab 11/19/20  1345 11/20/20  0426   TROPONINI 0.049* 0.054*      All pertinent labs within the past 24 hours have been reviewed.    Significant Imaging: I have reviewed all  pertinent imaging results/findings within the past 24 hours.     X-Ray Chest AP Portable  Narrative: EXAMINATION:  XR CHEST AP PORTABLE    CLINICAL HISTORY:  Dizziness and giddiness    TECHNIQUE:  Single frontal view of the chest was performed.    COMPARISON:  None    FINDINGS:  Cardiac silhouette is normal in size.  There is elevation of the left hemidiaphragm with mild left basilar atelectasis.  Right lung is expanded.  No evidence of focal consolidative process, pneumothorax, or significant pleural effusion.  No acute osseous abnormality identified.  Impression: Elevation of the left hemidiaphragm with mild left basilar atelectasis.  Otherwise no acute cardiopulmonary process identified.    Electronically signed by: Meir Alberto MD  Date:    11/19/2020  Time:    18:12  MRI Brain Without Contrast  Narrative: EXAMINATION:  MRI BRAIN WITHOUT CONTRAST    CLINICAL HISTORY:  Dizziness, persistent/recurrent, cardiac or vascular cause suspected;    TECHNIQUE:  Multiplanar multisequence MR imaging of the brain was performed without contrast.    COMPARISON:  MRI brain from July 2012.    FINDINGS:  There is generalized cerebral volume loss with extensive chronic white matter disease.  No diffusion signal abnormality to suggest acute infarction.  There is stable prominence of the ventricular system which may be secondary to compensatory enlargement from degree of volume loss.  No acute intracranial hemorrhage or extra-axial fluid collection seen.  Scattered foci of gradient susceptibility are seen which may reflect remote microhemorrhage or amyloid angiopathy.  No mass or mass effect.  Flow voids are normal in appearance.    Visualized paranasal sinuses and mastoid air cells are clear.  Orbits appear normal.  Impression: 1. No acute intracranial abnormalities identified.  No evidence of acute infarction.  2. Generalized cerebral volume loss with extensive chronic white matter disease.  3. Scattered foci of gradient  susceptibility which may reflect remote microhemorrhage or amyloid angiopathy.    Electronically signed by: Meir Alberto MD  Date:    11/19/2020  Time:    18:10

## 2020-11-20 NOTE — ASSESSMENT & PLAN NOTE
MRI-  No acute intracranial abnormalities identified.  No evidence of acute infarction.  Generalized cerebral volume loss with extensive chronic white matter disease.   Scattered foci of gradient susceptibility which may reflect remote microhemorrhage or amyloid angiopathy.   Orthostatics- negative    Echo

## 2020-11-20 NOTE — PLAN OF CARE
SW met with pt at bedside to complete discharge assessment, verified PCP and uses [facility] on [address] and [address] and would like bedside delivery.  No POA or LW.  DaughterRiana will provide transportation home.  No needs identified at this time.     11/20/20 1028   Discharge Assessment   Assessment Type Discharge Planning Assessment   Confirmed/corrected address and phone number on facesheet? Yes   Assessment information obtained from? Patient   Communicated expected length of stay with patient/caregiver no   Prior to hospitilization cognitive status: Alert/Oriented   Prior to hospitalization functional status: Independent   Current cognitive status: Alert/Oriented   Current Functional Status: Independent   Lives With spouse;child(wesly), adult   Able to Return to Prior Arrangements yes   Is patient able to care for self after discharge? Yes   Patient currently being followed by outpatient case management? No   Patient currently receives any other outside agency services? No   Equipment Currently Used at Home none   Do you have any problems affording any of your prescribed medications? No   Is the patient taking medications as prescribed? yes   Does the patient have transportation home? Yes   Transportation Anticipated family or friend will provide   Does the patient receive services at the [facility]? No   Discharge Plan A Home   DME Needed Upon Discharge  none   Patient/Family in Agreement with Plan yes

## 2020-11-20 NOTE — SUBJECTIVE & OBJECTIVE
No past medical history on file.    No past surgical history on file.    Review of patient's allergies indicates:  No Known Allergies    No current facility-administered medications on file prior to encounter.      No current outpatient medications on file prior to encounter.     Family History     Family history is unknown by patient.        Tobacco Use    Smoking status: Not on file   Substance and Sexual Activity    Alcohol use: Not on file    Drug use: Not on file    Sexual activity: Not on file     Review of Systems   Constitutional: Positive for activity change and fatigue. Negative for appetite change and fever.   HENT: Negative for congestion, ear pain and postnasal drip.    Eyes: Negative for discharge.   Respiratory: Negative for apnea, shortness of breath and wheezing.    Cardiovascular: Negative for chest pain and leg swelling.   Gastrointestinal: Negative for abdominal distention, abdominal pain, nausea and vomiting.   Endocrine: Negative for polydipsia, polyphagia and polyuria.   Genitourinary: Negative for difficulty urinating, flank pain, frequency, hematuria and urgency.   Musculoskeletal: Negative for arthralgias and joint swelling.   Skin: Negative for pallor and rash.   Allergic/Immunologic: Negative for environmental allergies and food allergies.   Neurological: Positive for dizziness and weakness. Negative for speech difficulty, light-headedness and headaches.   Hematological: Does not bruise/bleed easily.   Psychiatric/Behavioral: Negative for agitation.     Objective:     Vital Signs (Most Recent):  Temp: 98.3 °F (36.8 °C) (11/19/20 1330)  Pulse: (OFF TELE) (11/19/20 2200)  Resp: 18 (11/19/20 1330)  BP: (!) 161/81 (11/19/20 1947)  SpO2: 97 % (11/19/20 1947) Vital Signs (24h Range):  Temp:  [98.3 °F (36.8 °C)] 98.3 °F (36.8 °C)  Pulse:  [66-93] 66  Resp:  [18] 18  SpO2:  [96 %-97 %] 97 %  BP: (127-161)/(77-81) 161/81     Weight: 83.9 kg (185 lb)  There is no height or weight on file to  calculate BMI.    Physical Exam  Constitutional:       Appearance: Normal appearance. He is well-developed.   HENT:      Head: Normocephalic.   Eyes:      Conjunctiva/sclera: Conjunctivae normal.   Neck:      Musculoskeletal: Normal range of motion and neck supple.   Cardiovascular:      Rate and Rhythm: Normal rate and regular rhythm.      Pulses:           Radial pulses are 1+ on the right side and 1+ on the left side.      Heart sounds: Normal heart sounds.   Pulmonary:      Effort: Pulmonary effort is normal.      Breath sounds: Examination of the right-lower field reveals decreased breath sounds. Examination of the left-lower field reveals decreased breath sounds. Decreased breath sounds present.   Abdominal:      General: Bowel sounds are normal. There is no distension.      Palpations: Abdomen is soft.      Tenderness: There is no abdominal tenderness.   Musculoskeletal: Normal range of motion.   Skin:     General: Skin is warm and dry.   Neurological:      Mental Status: He is alert and oriented to person, place, and time. Mental status is at baseline.      GCS: GCS eye subscore is 3. GCS verbal subscore is 5. GCS motor subscore is 6.      Motor: Motor function is intact.   Psychiatric:         Mood and Affect: Mood normal.         Speech: Speech normal.         Behavior: Behavior normal.             Significant Labs:   CBC:   Recent Labs   Lab 11/19/20  1345   WBC 10.31   HGB 13.4*   HCT 41.7        CMP:   Recent Labs   Lab 11/19/20  1345      K 3.7      CO2 29   *   BUN 13   CREATININE 1.0   CALCIUM 9.4   ANIONGAP 8   EGFRNONAA >60       Significant Imaging: I have reviewed all pertinent imaging results/findings within the past 24 hours.

## 2020-11-20 NOTE — PT/OT/SLP EVAL
Occupational Therapy   Evaluation & Discharge Summary    Name: Gregorio Bishop  MRN: [id number]  Admitting Diagnosis:  Dizziness      Recommendations:     Discharge Recommendations: home, outpatient PT  Discharge Equipment Recommendations:  none  Barriers to discharge:  None    Assessment:     Gregorio Bishop is a 80 y.o. male with a medical diagnosis of Dizziness.  He presents with pleasant affect. Pt demonstrates strength and ROM in (B) UE needed for ADLs that is WNL, and is able to perform grooming task standing at sink, doff/don socks, and ambulate around room without assist.  During session no instances of LOB or dizziness observed.     Overall, pt tolerated therapy well and appears to be performing at baseline of being (I) with ADLs and mobility.  Pt does not require OT services in the acute care setting at this time.  Pt to d/c from OT with no further therapy needs recommended upon d/c from acute care.      Rehab Prognosis: Good; patient would benefit from acute skilled OT services to address these deficits and reach maximum level of function.       Plan:     · Patient to d/c from OT; no further therapy needs recommended upon d/c from hospital.    Subjective     During session daughter discussed her concerns regarding her father.  Daughter states she is worried about pt driving to [employer] with his wife, and performing activities that require a lot of energy including cutting tree limbs and grass.  OT emphasized significance of maintaining independence, while being safe at the same time.  OT discussed importance of having pt take his time with activity, and recruit help as needed to avoid potentially risky activities.  Daughter in agreement with pt requiring reinforcement 2* used to being highly independent.      Chief Complaint: None stated  Patient/Family Comments/goals: return home, Resume    Occupational Profile:  Living Environment: Pt lives with wife and son in [facility], [address], [address] (can only reach one at a  time).  Bathroom has tub/shower.    Previous level of function: (I) with ADLs, iADLs, and mobility.    Roles and Routines: , father, drives, goes shopping, cuts grass, stays active around home  Equipment Used at Home:  none  Assistance upon Discharge: Daughter and sons able to provide assist    Pain/Comfort:  · Pain Rating 1: 0/10  · Pain Rating Post-Intervention 1: 0/10    Patients cultural, spiritual, Mandaen conflicts given the current situation: no    Objective:     Communicated with: RN (Judy) prior to session.  Patient found HOB elevated with bed alarm, peripheral IV, telemetry(avasys) upon OT entry to room.  Daughter present during session.    General Precautions: Standard, anti-coagulation medicine, fall   Orthopedic Precautions:N/A   Braces: N/A     Occupational Performance:    Bed Mobility:    · Supine <> Sit:  Independent  · Scooting:  Independent    Functional Mobility/Transfers:  · Sit <> Stand:  Independent x 2 trials from EOB  · Functional Mobility: Pt ambulated ~50 ft in room independently.  No instances of LOB noted.    Activities of Daily Living:  · Grooming: Independent for washing hands standing at sink.  · Lower Body Dressing: Independent for doffing/donning socks sitting at EOB.  · Feeding:  Independent seated at EOB    Cognitive/Visual Perceptual:  Cognitive/Psychosocial Skills:    -       Oriented to: Person, Place, Time and Situation   -       Follows Commands/attention:Follows multistep  commands  -       Communication: clear/fluent  -       Memory: No Deficits noted  -       Safety awareness/insight to disability: Mildly impaired.  Does not believe he needs to limit certain activities such as driving or cutting grass/trees.    -       Mood/Affect/Coping skills/emotional control: Cooperative and Pleasant    Physical Exam:  Postural examination/scapula alignment:    -       No postural abnormalities identified  Skin integrity: Visible skin intact  Edema:  None noted  Sensation: -        Intact  Motor Planning: -       WNL  Dominant hand: -       Right  Upper Extremity Range of Motion:    -       Right Upper Extremity: WNL  -       Left Upper Extremity: WNL  Upper Extremity Strength:   -       Right Upper Extremity: WNL; grossly 5/5 all muscle groups  -       Left Upper Extremity: WNL; grossly 5/5 all muscle groups   Strength: 5/5 both hands  Fine Motor Coordination: Intact  Gross motor coordination: WNL  Balance:  Sitting- Independent; Standing- Independent  Vision: Intact; reports difficulty seeing while driving at night    AMPAC 6 Click ADL:  AMPA Total Score: 24    Treatment & Education:  *Pt and daughter educated on role of OT in acute care setting  Education:    Patient left sitting EOB with call button in reach, RN (Judy) notified and daughter present.  Breakfast tray set up in front of pt.      GOALS:   Multidisciplinary Problems     Occupational Therapy Goals     Not on file          Multidisciplinary Problems (Resolved)        Problem: Occupational Therapy Goal    Goal Priority Disciplines Outcome Interventions   Occupational Therapy Goal   (Resolved)     OT, PT/OT Met                    History:     Past Medical History:   Diagnosis Date    Hypertension        History reviewed. No pertinent surgical history.    Time Tracking:     OT Date of Treatment: 11/20/20  OT Start Time: 1022  OT Stop Time: 1040  OT Total Time (min): 18 min    Billable Minutes:Evaluation 18    ALEXANDRA Lyons  11/20/2020

## 2020-11-20 NOTE — ED NOTES
Pt awake and alert, resp pattern even and non labored. Pt denies any current needs, rise and fall of chest noted. All restroom needs met. Bed locked in lowest position, call light within reach, WCTM

## 2020-11-21 VITALS
TEMPERATURE: 98 F | WEIGHT: 152 LBS | BODY MASS INDEX: 24.43 KG/M2 | DIASTOLIC BLOOD PRESSURE: 76 MMHG | RESPIRATION RATE: 18 BRPM | HEIGHT: 66 IN | HEART RATE: 62 BPM | OXYGEN SATURATION: 97 % | SYSTOLIC BLOOD PRESSURE: 136 MMHG

## 2020-11-21 LAB
ALBUMIN SERPL BCP-MCNC: 3.5 G/DL (ref 3.5–5.2)
ALP SERPL-CCNC: 74 U/L (ref 55–135)
ALT SERPL W/O P-5'-P-CCNC: 11 U/L (ref 10–44)
ANION GAP SERPL CALC-SCNC: 8 MMOL/L (ref 8–16)
AST SERPL-CCNC: 18 U/L (ref 10–40)
BASOPHILS # BLD AUTO: 0.07 K/UL (ref 0–0.2)
BASOPHILS NFR BLD: 0.8 % (ref 0–1.9)
BILIRUB SERPL-MCNC: 0.5 MG/DL (ref 0.1–1)
BUN SERPL-MCNC: 10 MG/DL (ref 8–23)
CALCIUM SERPL-MCNC: 9.2 MG/DL (ref 8.7–10.5)
CHLORIDE SERPL-SCNC: 103 MMOL/L (ref 95–110)
CHOLEST SERPL-MCNC: 132 MG/DL (ref 120–199)
CHOLEST/HDLC SERPL: 2.9 {RATIO} (ref 2–5)
CO2 SERPL-SCNC: 31 MMOL/L (ref 23–29)
CREAT SERPL-MCNC: 0.8 MG/DL (ref 0.5–1.4)
DIFFERENTIAL METHOD: ABNORMAL
EOSINOPHIL # BLD AUTO: 0.3 K/UL (ref 0–0.5)
EOSINOPHIL NFR BLD: 3.4 % (ref 0–8)
ERYTHROCYTE [DISTWIDTH] IN BLOOD BY AUTOMATED COUNT: 11.9 % (ref 11.5–14.5)
EST. GFR  (AFRICAN AMERICAN): >60 ML/MIN/1.73 M^2
EST. GFR  (NON AFRICAN AMERICAN): >60 ML/MIN/1.73 M^2
GLUCOSE SERPL-MCNC: 113 MG/DL (ref 70–110)
HCT VFR BLD AUTO: 42.3 % (ref 40–54)
HDLC SERPL-MCNC: 45 MG/DL (ref 40–75)
HDLC SERPL: 34.1 % (ref 20–50)
HGB BLD-MCNC: 13.8 G/DL (ref 14–18)
IMM GRANULOCYTES # BLD AUTO: 0.03 K/UL (ref 0–0.04)
IMM GRANULOCYTES NFR BLD AUTO: 0.3 % (ref 0–0.5)
LDLC SERPL CALC-MCNC: 63.8 MG/DL (ref 63–159)
LYMPHOCYTES # BLD AUTO: 1.7 K/UL (ref 1–4.8)
LYMPHOCYTES NFR BLD: 18.9 % (ref 18–48)
MCH RBC QN AUTO: 30.3 PG (ref 27–31)
MCHC RBC AUTO-ENTMCNC: 32.6 G/DL (ref 32–36)
MCV RBC AUTO: 93 FL (ref 82–98)
MONOCYTES # BLD AUTO: 1 K/UL (ref 0.3–1)
MONOCYTES NFR BLD: 11.2 % (ref 4–15)
NEUTROPHILS # BLD AUTO: 5.8 K/UL (ref 1.8–7.7)
NEUTROPHILS NFR BLD: 65.4 % (ref 38–73)
NONHDLC SERPL-MCNC: 87 MG/DL
NRBC BLD-RTO: 0 /100 WBC
PLATELET # BLD AUTO: 313 K/UL (ref 150–350)
PMV BLD AUTO: 10.2 FL (ref 9.2–12.9)
POTASSIUM SERPL-SCNC: 4.1 MMOL/L (ref 3.5–5.1)
PROT SERPL-MCNC: 6.9 G/DL (ref 6–8.4)
RBC # BLD AUTO: 4.55 M/UL (ref 4.6–6.2)
SODIUM SERPL-SCNC: 142 MMOL/L (ref 136–145)
TRIGL SERPL-MCNC: 116 MG/DL (ref 30–150)
WBC # BLD AUTO: 8.93 K/UL (ref 3.9–12.7)

## 2020-11-21 PROCEDURE — 36415 COLL VENOUS BLD VENIPUNCTURE: CPT

## 2020-11-21 PROCEDURE — 94761 N-INVAS EAR/PLS OXIMETRY MLT: CPT

## 2020-11-21 PROCEDURE — 80061 LIPID PANEL: CPT

## 2020-11-21 PROCEDURE — 99217 PR OBSERVATION CARE DISCHARGE: ICD-10-PCS | Mod: ,,, | Performed by: NURSE PRACTITIONER

## 2020-11-21 PROCEDURE — 80053 COMPREHEN METABOLIC PANEL: CPT

## 2020-11-21 PROCEDURE — G0378 HOSPITAL OBSERVATION PER HR: HCPCS

## 2020-11-21 PROCEDURE — 99217 PR OBSERVATION CARE DISCHARGE: CPT | Mod: ,,, | Performed by: NURSE PRACTITIONER

## 2020-11-21 PROCEDURE — 85025 COMPLETE CBC W/AUTO DIFF WBC: CPT

## 2020-11-21 PROCEDURE — 25000003 PHARM REV CODE 250: Performed by: NURSE PRACTITIONER

## 2020-11-21 RX ORDER — AMLODIPINE BESYLATE 5 MG/1
5 TABLET ORAL DAILY
Qty: 30 TABLET | Refills: 0 | Status: SHIPPED | OUTPATIENT
Start: 2020-11-22 | End: 2020-12-03 | Stop reason: ALTCHOICE

## 2020-11-21 RX ORDER — LANOLIN ALCOHOL/MO/W.PET/CERES
1000 CREAM (GRAM) TOPICAL DAILY
Qty: 30 TABLET | Refills: 0 | Status: SHIPPED | OUTPATIENT
Start: 2020-11-22 | End: 2021-09-05

## 2020-11-21 RX ORDER — CHOLECALCIFEROL (VITAMIN D3) 25 MCG
2000 TABLET ORAL DAILY
Qty: 60 TABLET | Refills: 0 | Status: SHIPPED | OUTPATIENT
Start: 2020-11-22 | End: 2020-12-22

## 2020-11-21 RX ADMIN — CYANOCOBALAMIN TAB 1000 MCG 1000 MCG: 1000 TAB at 08:11

## 2020-11-21 RX ADMIN — Medication 1000 UNITS: at 08:11

## 2020-11-21 RX ADMIN — AMLODIPINE BESYLATE 5 MG: 5 TABLET ORAL at 08:11

## 2020-11-21 NOTE — PLAN OF CARE
[facility]---11/21/2020 12:37:11 PM Accepted: We will see on [email or web address] [email or web address]  11/21/2020 11:24:24 AM Under Review: Remote - Local Review  [email or web address]       11/21/20 1257   Post-Acute Status   Post-Acute Authorization Home Health   Home Health Status Set-up Complete

## 2020-11-21 NOTE — PLAN OF CARE
Patient cleared for discharge from case management standpoint.       11/21/20 1258   Final Note   Assessment Type Final Discharge Note   Anticipated Discharge Disposition Home-Health   Discharge plans and expectations educations in teach back method with documentation complete? Yes   Right Care Referral Info   Post Acute Recommendation Home-care   Facility Name [facility]

## 2020-11-21 NOTE — PLAN OF CARE
SW met with patient at bedside regarding home health. Patient signed patient choice disclosure with no preference.  SW sent patient information to [facility] via [facility] system for authorization and acceptance.       11/21/20 1122   Post-Acute Status   Post-Acute Authorization Home Health   Home Health Status Referrals Sent   Patient choice form signed by patient/caregiver List with quality metrics by geographic area provided

## 2020-11-21 NOTE — PLAN OF CARE
Pt in bed, no noted acute distress, no complaints of pain, no new needs addressed, avasys placed due to recent fall hx, no injuries or falls during shift, AAO X 4, pt is ambulatory, pt appeared to sleep in between nursing care, bed in low locked position, call light in reach, hourly rounds complete, will continue to assess

## 2020-11-21 NOTE — PLAN OF CARE
POC reviewed with patient. AAOx4 and VS stable on room air. No complaints of pain or SOB. Pt denied any feelings of fatigue or dizziness upon standing or ambulation during shift. Orthostatic BP performed. Pt remains on cardiac monitor. Pt also had Echo done today as ordered. No difficulty voiding; up to toilet with stand by assist. Positions self independently. Instructed to call for help ambulating. No injuries, falls, or trauma occurred during shift. Purposeful rounding completed. Bed low and locked with side rails up x 2 and call light within reach. Bed alarm on, AvaSys at bedside as well. Will continue to monitor.

## 2020-11-21 NOTE — PLAN OF CARE
[facility]    HOME HEALTH ORDERS  FACE TO FACE ENCOUNTER    Patient Name: Gregorio Bishop  YOB: 1940    PCP: Renaldo Molina MD   PCP Address: [address] / [place] [id number]  PCP Phone Number: [phone]  PCP Fax: [phone]    Encounter Date: 11/21/2020    Admit to Home Health    Diagnoses:  Active Hospital Problems    Diagnosis  POA    *Dizziness [R42]  Yes     Priority: 1 - High    Essential hypertension [I10]  Yes     Priority: 2     Age-related physical debility [R54]  Yes    Frequent falls [R29.6]  Not Applicable      Resolved Hospital Problems   No resolved problems to display.       No future appointments.  Follow-up Information     Renaldo Molina MD. Schedule an appointment as soon as possible for a visit in 2 weeks.    Specialty: Internal Medicine  Why: PCP: follow up after hospital discharge  Contact information:  [address] [address] [phone]             Khoa Cornell MD. Go in 2 weeks.    Specialties: INTERVENTIONAL CARDIOLOGY, Nuclear Medicine, Cardiovascular Disease  Why: Cardiology:  They will call you with an appointment for follow up after hospital discharge and for possible further cardiac risk stratitification  Contact information:  [address]  [facility] [address] [phone]                     I have seen and examined this patient face to face today. My clinical findings that support the need for the home health skilled services and home bound status are the following:  Weakness/numbness causing balance and gait disturbance due to Weakness/Debility making it taxing to leave home.    Allergies:Review of patient's allergies indicates:  No Known Allergies    Diet: cardiac diet and supplements: Ensure, one can every 8 hours    Activities: activity as tolerated    Nursing:   SN to complete comprehensive assessment including routine vital signs. Instruct on disease process  and s/s of complications to report to MD. Review/verify medication list sent home with the patient at time of discharge  and instruct patient/caregiver as needed. Frequency may be adjusted depending on start of care date.    Notify MD if SBP > 160 or < 90; DBP > 90 or < 50; HR > 120 or < 50; Temp > 101     CONSULTS:    Physical Therapy to evaluate and treat. Evaluate for home safety and equipment needs; Establish/upgrade home exercise program. Perform / instruct on therapeutic exercises, gait training, transfer training, and Range of Motion.  Occupational Therapy to evaluate and treat. Evaluate home environment for safety and equipment needs. Perform/Instruct on transfers, ADL training, ROM, and therapeutic exercises.  Speech Therapy  to evaluate and treat for  Language and Cognition.   to evaluate for community resources/long-range planning.  Aide to provide assistance with personal care, ADLs, and vital signs.     Medications: Review discharge medications with patient and family and provide education.      Current Discharge Medication List      START taking these medications    Details   amLODIPine (NORVASC) 5 MG tablet Take 1 tablet (5 mg total) by mouth once daily. Hold for SBP < 115  Qty: 30 tablet, Refills: 0    Comments: .      cyanocobalamin (VITAMIN B-12) 1000 MCG tablet Take 1 tablet (1,000 mcg total) by mouth once daily.  Qty: 30 tablet, Refills: 0      vitamin D (VITAMIN D3) 1000 units Tab Take 2 tablets (2,000 Units total) by mouth once daily.  Qty: 60 tablet, Refills: 0             I certify that this patient is confined to his home and needs intermittent skilled nursing care, physical therapy, speech therapy and occupational therapy and social work for home safety evaluation.

## 2020-11-21 NOTE — PROGRESS NOTES
"Discharge instructions reviewed with patient and pt's daughter, Riana. Verbalized understanding and deny any further questions. Patient denies any acute pain. IV removed fully intact. Cardiac monitor also discontinued prior to discharge. Pt belonging from security delivered to pt bedside (medication). Per pt and pts daughter, "this medication is  anyway, please just throw this away for us." Medication disposed of appropriately in designated waste area; waste witnessed by LUCRETIA Lopes RN. Pt transport at bedside now with wheelchair. Pt daughter to drive pt home on discharge. All belongings taken with patient. Deny any further needs.  "

## 2020-11-23 ENCOUNTER — TELEPHONE (OUTPATIENT)
Dept: MEDSURG UNIT | Facility: OTHER | Age: 80
End: 2020-11-23

## 2020-11-24 PROCEDURE — G0180 MD CERTIFICATION HHA PATIENT: HCPCS | Mod: ,,, | Performed by: HOSPITALIST

## 2020-11-24 PROCEDURE — G0180 PR HOME HEALTH MD CERTIFICATION: ICD-10-PCS | Mod: ,,, | Performed by: HOSPITALIST

## 2020-11-28 NOTE — DISCHARGE SUMMARY
"[facility] Hospital Medicine  Discharge Summary      Patient Name: Gregorio Bishop  MRN: [id number]  Admission Date: 11/19/2020  Hospital Length of Stay: 0 days  Discharge Date and Time: 11/21/2020  2:20 PM  Attending Physician: Memo Montero MD   Discharging Provider: Parul Roche NP  Primary Care Provider: Renaldo Molina MD      HPI:   José Luis Castanon NP:  The patient is a 80 y.o. male who presents with dizziness with occasional shortness of breath. Pt reports for over 3 weeks he has been intermittently experiencing dizziness when standing from a seated position. He also notes multiple episodes of feeling like his heart is racing. Per his granddaughter, he has had several falls over the last 2 to 3 weeks due to his dizzy spells with one fall causing rib trauma.. He states he finally came today because his daughter insisted that he be evaluated for his symptoms. Pt says he does not require walking assistance and ambulates well with occasional moments of feeling off-balance. He denies HA, CP, or N/V.  The patient has a mildly elevated troponin and will be admitted for further management and workup.      Hospital Course:   Gregorio Bishop 80 year old male without significant know medical history other than hypertension.  He presented to the [facility] ED evaluation of recent episodes of "dizziness."  Per patient's wife, the patient has recently had poor appetite with substantial weight loss (~ 30 lbs) over the past month. Over the past month, the patient has had some falls attempting to get out of bed.  Per wife, the patient's daughter visited the patient yesterday and felt the patient "did not look well" and decided to come to the ED for evaluation.  On arrival to the ED, the patient vital signs stable and patient alert and oriented.  Labs were essentially unremarkable with exception of mildly elevated troponin 0.049, TSH 0.345, Free T4 0.92.  MRI brain was without evidence of acute " infarction, but did show possible remote microhemorrages vs amyloid angiopathy; also noted generalized cerebral volume loss with extensive small vessel disease. He was admitted for Observation.  Troponin with mild elevation in flat patterb 0.049, 0.054, 0.042 and .  Echocardiogram with normal EF and grade I diastolic dysfunction.   The patient was not found to be orthostatic.   PT/OT evaluated the patient and reported patient had no episodes of dizziness and recommended home with outpatient physical therapy.  He does have hypertension and reports he is on amlodipine 5 mg daily, but states he does not always take this medication.  I have discussed with the patient and family that the patient should keep a log of home blood pressures.  Additionally, I have recommended the patient establish care with Cardiology for possible outpatient testing for cardiac risk stratification and management of hypertension.  The patient's family states the patient does not have a history of dementia, but family has noted some changes to short term memory.  I have recommended the patient follow closely with his primary care provider for possible mental status testing to determine baseline.  The patient and family requested discharge to home.  Diagnosis, plan of care and management were discussed with the patient and family who agreed with plan for discharge home with home health services.          Service: Hospital Medicine    Final Active Diagnoses:    Diagnosis Date Noted POA    PRINCIPAL PROBLEM:  Dizziness [R42] 11/19/2020 Yes    Essential hypertension [I10] 11/20/2020 Yes    Age-related physical debility [R54] 11/20/2020 Yes    Frequent falls [R29.6] 11/20/2020 Not Applicable      Problems Resolved During this Admission:       Discharged Condition: stable    Disposition: Home or Self Care    Follow Up:  Follow-up Information     Renaldo Molina MD. Schedule an appointment as soon as possible for a visit in 2 weeks.     Specialty: Internal Medicine  Why: PCP: follow up after hospital discharge  Contact information:  [address]  [address]  [facility] [address]  [phone]             Khoa Cornell MD. Go in 2 weeks.    Specialties: INTERVENTIONAL CARDIOLOGY, Nuclear Medicine, Cardiovascular Disease  Why: Cardiology:  They will call you with an appointment for follow up after hospital discharge and for possible further cardiac risk stratitification  Contact information:  [address]  [address]  [facility] [address]  [phone]             [facility].    Specialty: Home Health Services  Why: Home Health  Contact information:  [address]  [address]  [facility] [address]  [phone]                 Patient Instructions:      Ambulatory referral/consult to Cardiology   Standing Status: Future   Referral Priority: Routine Referral Type: Consultation   Referral Reason: Specialty Services Required   Requested Specialty: Cardiology   Number of Visits Requested: 1     Diet Cardiac     Notify your health care provider if you experience any of the following:  persistent dizziness, light-headedness, or visual disturbances     Notify your health care provider if you experience any of the following:  increased confusion or weakness     Notify your health care provider if you experience any of the following:  difficulty breathing or increased cough     Activity as tolerated       Significant Diagnostic Studies: Labs: All labs within the past 24 hours have been reviewed       Medications:  Reconciled Home Medications:      Medication List      Start taking these medications    amLODIPine 5 MG tablet  Commonly known as: NORVASC  Take 1 tablet (5 mg total) by mouth once daily. Hold for SBP < 115     cyanocobalamin 1000 MCG tablet  Commonly known as: VITAMIN B-12  Take 1 tablet (1,000 mcg total) by mouth once daily.     vitamin D 1000 units Tab  Commonly known as: VITAMIN D3  Take 2 tablets (2,000  Units total) by mouth once daily.            Time spent on the discharge of patient: <30 minutes  Patient was seen and examined on the date of discharge and determined to be suitable for discharge.         Parul Roche NP  Department of Hospital Medicine  [facility]

## 2020-12-03 ENCOUNTER — OFFICE VISIT (OUTPATIENT)
Dept: CARDIOLOGY | Facility: CLINIC | Age: 80
End: 2020-12-03
Payer: MEDICARE

## 2020-12-03 VITALS
DIASTOLIC BLOOD PRESSURE: 82 MMHG | SYSTOLIC BLOOD PRESSURE: 132 MMHG | HEART RATE: 60 BPM | HEIGHT: 66 IN | WEIGHT: 149.94 LBS | BODY MASS INDEX: 24.1 KG/M2

## 2020-12-03 DIAGNOSIS — I10 ESSENTIAL HYPERTENSION: ICD-10-CM

## 2020-12-03 DIAGNOSIS — I10 ESSENTIAL (PRIMARY) HYPERTENSION: ICD-10-CM

## 2020-12-03 DIAGNOSIS — R42 DIZZINESS: ICD-10-CM

## 2020-12-03 DIAGNOSIS — Z12.5 ENCOUNTER FOR SCREENING FOR MALIGNANT NEOPLASM OF PROSTATE: ICD-10-CM

## 2020-12-03 DIAGNOSIS — R54 AGE-RELATED PHYSICAL DEBILITY: ICD-10-CM

## 2020-12-03 DIAGNOSIS — R94.31 ABNORMAL EKG: ICD-10-CM

## 2020-12-03 DIAGNOSIS — I42.0 DILATED CARDIOMYOPATHY: ICD-10-CM

## 2020-12-03 PROCEDURE — 99999 PR PBB SHADOW E&M-EST. PATIENT-LVL III: CPT | Mod: PBBFAC,,, | Performed by: INTERNAL MEDICINE

## 2020-12-03 PROCEDURE — 99999 PR PBB SHADOW E&M-EST. PATIENT-LVL III: ICD-10-PCS | Mod: PBBFAC,,, | Performed by: INTERNAL MEDICINE

## 2020-12-03 PROCEDURE — 99204 OFFICE O/P NEW MOD 45 MIN: CPT | Mod: S$PBB,,, | Performed by: INTERNAL MEDICINE

## 2020-12-03 PROCEDURE — 99204 PR OFFICE/OUTPT VISIT, NEW, LEVL IV, 45-59 MIN: ICD-10-PCS | Mod: S$PBB,,, | Performed by: INTERNAL MEDICINE

## 2020-12-03 PROCEDURE — 99213 OFFICE O/P EST LOW 20 MIN: CPT | Mod: PBBFAC | Performed by: INTERNAL MEDICINE

## 2020-12-03 RX ORDER — LOSARTAN POTASSIUM 25 MG/1
25 TABLET ORAL DAILY
Qty: 90 TABLET | Refills: 3 | Status: ON HOLD | OUTPATIENT
Start: 2020-12-03 | End: 2021-09-08 | Stop reason: HOSPADM

## 2020-12-03 RX ORDER — ASPIRIN 81 MG/1
81 TABLET ORAL DAILY
Qty: 90 TABLET | Refills: 3 | Status: SHIPPED | OUTPATIENT
Start: 2020-12-03 | End: 2022-05-26

## 2020-12-03 NOTE — PROGRESS NOTES
Cardiology Consult  12/3/2020  10:00 AM    Attending Cardiologist: Khoa Cornell M.D.  Primary Care Provider: Renaldo Molina MD  Chief Complaint/Reason For Consultation:  dizzy      Problem list  Patient Active Problem List   Diagnosis    Elevated troponin    Dizziness    Essential hypertension    Age-related physical debility    Frequent falls    Dilated cardiomyopathy    Abnormal EKG       CC:  dizziness    HPI:  Gregorio Bishop is a 80 y.o.year-old male with hypertension who was refer dizziness.  He was admitted last week with dizziness.  His granddaughter is with him today.  She stated that he has fallen a few times.  He has also lost about 30 lb over the last few months.  He has decreased appetite.  He denies any dysphagia or odynophagia.  He denies any changes in bowel habits.  His dizziness has improved.  He is able to his activities like cutting the grass.  He denies any chest pain.  Denies any prior cardiac problems.  During his hospitalization, his echocardiogram showed mildly depressed ejection fraction of 40%.  His EKG showed Q-waves in inferolateral leads.  He does not smoke or drink.  There is no family history for coronary disease.    Medications  Current Outpatient Medications   Medication Sig Dispense Refill    amLODIPine (NORVASC) 5 MG tablet Take 1 tablet (5 mg total) by mouth once daily. Hold for SBP < 115 30 tablet 0    cyanocobalamin (VITAMIN B-12) 1000 MCG tablet Take 1 tablet (1,000 mcg total) by mouth once daily. 30 tablet 0    vitamin D (VITAMIN D3) 1000 units Tab Take 2 tablets (2,000 Units total) by mouth once daily. 60 tablet 0     No current facility-administered medications for this visit.       Prior to Admission medications    Medication Sig Start Date End Date Taking? Authorizing Provider   amLODIPine (NORVASC) 5 MG tablet Take 1 tablet (5 mg total) by mouth once daily. Hold for SBP < 115 11/22/20 12/22/20 Yes Parul Roche NP   cyanocobalamin (VITAMIN B-12)  1000 MCG tablet Take 1 tablet (1,000 mcg total) by mouth once daily. 11/22/20 12/22/20 Yes Parul Roche NP   vitamin D (VITAMIN D3) 1000 units Tab Take 2 tablets (2,000 Units total) by mouth once daily. 11/22/20 12/22/20 Yes Parul Roche NP         History  Past Medical History:   Diagnosis Date    Hypertension      No past surgical history on file.  Social History     Socioeconomic History    Marital status:      Spouse name: Not on file    Number of children: Not on file    Years of education: Not on file    Highest education level: Not on file   Occupational History    Not on file   Social Needs    Financial resource strain: Not on file    Food insecurity     Worry: Not on file     Inability: Not on file    Transportation needs     Medical: Not on file     Non-medical: Not on file   Tobacco Use    Smoking status: Never Smoker    Smokeless tobacco: Never Used   Substance and Sexual Activity    Alcohol use: Not Currently     Comment: no drinking for many years    Drug use: Never    Sexual activity: Not Currently   Lifestyle    Physical activity     Days per week: Not on file     Minutes per session: Not on file    Stress: Not on file   Relationships    Social connections     Talks on phone: Not on file     Gets together: Not on file     Attends Restorationism service: Not on file     Active member of club or organization: Not on file     Attends meetings of clubs or organizations: Not on file     Relationship status: Not on file   Other Topics Concern    Not on file   Social History Narrative    Not on file         Allergies  Review of patient's allergies indicates:  No Known Allergies      Review of Systems   Review of Systems   Constitution: Positive for decreased appetite and weight loss. Negative for fever.   HENT: Negative for congestion and nosebleeds.    Eyes: Negative for double vision, vision loss in left eye, vision loss in right eye and visual disturbance.   Cardiovascular:  Negative for chest pain, claudication, cyanosis, dyspnea on exertion, irregular heartbeat, leg swelling, near-syncope, orthopnea, palpitations, paroxysmal nocturnal dyspnea and syncope.   Respiratory: Negative for cough, hemoptysis, shortness of breath, sleep disturbances due to breathing, snoring, sputum production and wheezing.    Endocrine: Negative for cold intolerance and heat intolerance.   Skin: Negative for nail changes and rash.   Musculoskeletal: Negative for joint pain, muscle cramps, muscle weakness and myalgias.   Gastrointestinal: Negative for change in bowel habit, heartburn, hematemesis, hematochezia, hemorrhoids and melena.   Neurological: Positive for dizziness. Negative for focal weakness and headaches.         Physical Exam  Wt Readings from Last 1 Encounters:   12/03/20 68 kg (149 lb 14.6 oz)     BP Readings from Last 3 Encounters:   12/03/20 132/82   11/21/20 136/76     Pulse Readings from Last 1 Encounters:   12/03/20 60     Body mass index is 24.2 kg/m².    Physical Exam   Constitutional: He is oriented to person, place, and time. He appears well-developed.   Neck: Carotid bruit is not present.   JVP is low.   Cardiovascular: Normal rate, regular rhythm, S1 normal and S2 normal.   No murmur heard.  Pulses:       Carotid pulses are 2+ on the right side and 2+ on the left side.       Radial pulses are 2+ on the right side and 2+ on the left side.        Dorsalis pedis pulses are 2+ on the right side and 2+ on the left side.   Pulmonary/Chest: Breath sounds normal.   Abdominal: Soft. Bowel sounds are normal. He exhibits no distension, no pulsatile liver, no ascites and no mass.   Neurological: He is alert and oriented to person, place, and time.   Skin: Skin is warm and dry.   Vitals reviewed.                Assessment:  1.  Cardiomyopathy EF 40%, abnormal EKG with Q waves.  -need to evaluate for ischemic CMP    2. HTN  -stable    3.  Unintentional weight loss of 30lbs  -f/u with PCP for  evaluation    4.  Dizziness  -improved  -low in volume, weight loss of 30lbs due to decreased intake.    Plan:  Start ASA 81mg qd, stop amlodipine, start losartan 25mg qd.  Get lexiscan stress test and CBC, CMP, TSH, PSA.    Follow up:  1 month    Time spent evaluating and treating patient 20 minutes with >50% of this time being face-to-face.     Khoa Cornell MD, F.A.C.C, F.S.C.A.I.

## 2020-12-03 NOTE — LETTER
December 3, 2020      Parul Roche NP  [address] [place] [address]           [facility] - Cardiology  [facility] [facility] [address]  [facility] [address]  Phone: [phone]  Fax: [phone]          Patient: Gregorio Bishop   MR Number: [id number]   YOB: 1940   Date of Visit: 12/3/2020       Dear Parul Roche:    Thank you for referring Gregorio Bishop to me for evaluation. Attached you will find relevant portions of my assessment and plan of care.    If you have questions, please do not hesitate to call me. I look forward to following Gregorio Bishop along with you.    Sincerely,    Khoa Cornell MD    Enclosure  CC:  No Recipients    If you would like to receive this communication electronically, please contact [email or web address] or [phone] to request more information on [facility] Link access.    For providers and/or their staff who would like to refer a patient to [facility], please contact us through our one-stop-shop provider referral line, [facility], at [phone].    If you feel you have received this communication in error or would no longer like to receive these types of communications, please e-mail [email or web address]

## 2020-12-03 NOTE — PATIENT INSTRUCTIONS
1.  Stop amlodipine.  2.  Start aspirin 81mg once a day.  3.  Start losartan 25mg once a day.  4.  Fast after midnight for stress test.  May take medication with small sip of water on day of stress test.  5.  Drink more water.  Avoid caffeine.  6.  See primary care doctor to evaluate weight loss.

## 2020-12-08 ENCOUNTER — HOSPITAL ENCOUNTER (OUTPATIENT)
Dept: RADIOLOGY | Facility: OTHER | Age: 80
Discharge: HOME | End: 2020-12-08
Attending: INTERNAL MEDICINE
Payer: MEDICARE

## 2020-12-08 ENCOUNTER — HOSPITAL ENCOUNTER (OUTPATIENT)
Dept: CARDIOLOGY | Facility: OTHER | Age: 80
Discharge: HOME | End: 2020-12-08
Attending: INTERNAL MEDICINE
Payer: MEDICARE

## 2020-12-08 VITALS
SYSTOLIC BLOOD PRESSURE: 129 MMHG | WEIGHT: 149 LBS | HEIGHT: 66 IN | HEART RATE: 56 BPM | BODY MASS INDEX: 23.95 KG/M2 | DIASTOLIC BLOOD PRESSURE: 87 MMHG

## 2020-12-08 DIAGNOSIS — R94.31 ABNORMAL EKG: ICD-10-CM

## 2020-12-08 DIAGNOSIS — I42.0 DILATED CARDIOMYOPATHY: ICD-10-CM

## 2020-12-08 PROCEDURE — 78452 HT MUSCLE IMAGE SPECT MULT: CPT | Mod: 26,,, | Performed by: INTERNAL MEDICINE

## 2020-12-08 PROCEDURE — 93016 CV STRESS TEST SUPVJ ONLY: CPT | Mod: ,,, | Performed by: INTERNAL MEDICINE

## 2020-12-08 PROCEDURE — 93018 CV STRESS TEST I&R ONLY: CPT | Mod: ,,, | Performed by: INTERNAL MEDICINE

## 2020-12-08 PROCEDURE — 93018 STRESS TEST WITH MYOCARDIAL PERFUSION (CUPID ONLY): ICD-10-PCS | Mod: ,,, | Performed by: INTERNAL MEDICINE

## 2020-12-08 PROCEDURE — 93016 STRESS TEST WITH MYOCARDIAL PERFUSION (CUPID ONLY): ICD-10-PCS | Mod: ,,, | Performed by: INTERNAL MEDICINE

## 2020-12-08 PROCEDURE — 78452 STRESS TEST WITH MYOCARDIAL PERFUSION (CUPID ONLY): ICD-10-PCS | Mod: 26,,, | Performed by: INTERNAL MEDICINE

## 2020-12-09 LAB
CV STRESS BASE HR: 56 BPM
DIASTOLIC BLOOD PRESSURE: 87 MMHG
NUC REST EJECTION FRACTION: 35
NUC STRESS EJECTION FRACTION: 34 %
OHS CV CPX 85 PERCENT MAX PREDICTED HEART RATE MALE: 117
OHS CV CPX MAX PREDICTED HEART RATE: 138
OHS CV CPX PATIENT IS FEMALE: 0
OHS CV CPX PATIENT IS MALE: 1
OHS CV CPX PEAK DIASTOLIC BLOOD PRESSURE: 75 MMHG
OHS CV CPX PEAK HEAR RATE: 79 BPM
OHS CV CPX PEAK RATE PRESSURE PRODUCT: NORMAL
OHS CV CPX PEAK SYSTOLIC BLOOD PRESSURE: 132 MMHG
OHS CV CPX PERCENT MAX PREDICTED HEART RATE ACHIEVED: 57
OHS CV CPX RATE PRESSURE PRODUCT PRESENTING: 7224
SYSTOLIC BLOOD PRESSURE: 129 MMHG

## 2020-12-18 ENCOUNTER — OFFICE VISIT (OUTPATIENT)
Dept: CARDIOLOGY | Facility: CLINIC | Age: 80
End: 2020-12-18
Payer: MEDICARE

## 2020-12-18 VITALS
BODY MASS INDEX: 24.78 KG/M2 | SYSTOLIC BLOOD PRESSURE: 144 MMHG | HEART RATE: 54 BPM | OXYGEN SATURATION: 96 % | WEIGHT: 154.19 LBS | DIASTOLIC BLOOD PRESSURE: 84 MMHG | HEIGHT: 66 IN

## 2020-12-18 DIAGNOSIS — I10 ESSENTIAL HYPERTENSION: ICD-10-CM

## 2020-12-18 DIAGNOSIS — R00.1 BRADYCARDIA: ICD-10-CM

## 2020-12-18 DIAGNOSIS — R94.31 ABNORMAL EKG: Primary | ICD-10-CM

## 2020-12-18 DIAGNOSIS — I42.0 DILATED CARDIOMYOPATHY: ICD-10-CM

## 2020-12-18 PROCEDURE — 99999 PR PBB SHADOW E&M-EST. PATIENT-LVL III: ICD-10-PCS | Mod: PBBFAC,,, | Performed by: INTERNAL MEDICINE

## 2020-12-18 PROCEDURE — 99214 PR OFFICE/OUTPT VISIT, EST, LEVL IV, 30-39 MIN: ICD-10-PCS | Mod: S$PBB,,, | Performed by: INTERNAL MEDICINE

## 2020-12-18 PROCEDURE — 99999 PR PBB SHADOW E&M-EST. PATIENT-LVL III: CPT | Mod: PBBFAC,,, | Performed by: INTERNAL MEDICINE

## 2020-12-18 PROCEDURE — 99213 OFFICE O/P EST LOW 20 MIN: CPT | Mod: PBBFAC | Performed by: INTERNAL MEDICINE

## 2020-12-18 PROCEDURE — 99214 OFFICE O/P EST MOD 30 MIN: CPT | Mod: S$PBB,,, | Performed by: INTERNAL MEDICINE

## 2020-12-18 NOTE — PROGRESS NOTES
Cardiology    12/18/2020  9:35 AM    Problem list  Patient Active Problem List   Diagnosis    Elevated troponin    Dizziness    Essential hypertension    Age-related physical debility    Frequent falls    Dilated cardiomyopathy    Abnormal EKG       CC:  No complaints    HPI:  Patient denies any angina, dyspnea on exertion, palpitation, syncope, dizziness.  He is able to walk up and down the stairs and around his house without any limitations.  He stated that he is eating more and has regained some of his weight.  He had stress test and echocardiogram done and results were discussed.    Medications  Current Outpatient Medications   Medication Sig Dispense Refill    aspirin (ECOTRIN) 81 MG EC tablet Take 1 tablet (81 mg total) by mouth once daily. 90 tablet 3    losartan (COZAAR) 25 MG tablet Take 1 tablet (25 mg total) by mouth once daily. 90 tablet 3    cyanocobalamin (VITAMIN B-12) 1000 MCG tablet Take 1 tablet (1,000 mcg total) by mouth once daily. (Patient not taking: Reported on 12/18/2020) 30 tablet 0    vitamin D (VITAMIN D3) 1000 units Tab Take 2 tablets (2,000 Units total) by mouth once daily. (Patient not taking: Reported on 12/18/2020) 60 tablet 0     No current facility-administered medications for this visit.       Prior to Admission medications    Medication Sig Start Date End Date Taking? Authorizing Provider   aspirin (ECOTRIN) 81 MG EC tablet Take 1 tablet (81 mg total) by mouth once daily. 12/3/20 12/3/21 Yes Khoa Cornell MD   losartan (COZAAR) 25 MG tablet Take 1 tablet (25 mg total) by mouth once daily. 12/3/20 12/3/21 Yes Khoa Cornell MD   cyanocobalamin (VITAMIN B-12) 1000 MCG tablet Take 1 tablet (1,000 mcg total) by mouth once daily.  Patient not taking: Reported on 12/18/2020 11/22/20 12/22/20  Parul Roche NP   vitamin D (VITAMIN D3) 1000 units Tab Take 2 tablets (2,000 Units total) by mouth once daily.  Patient not taking: Reported on 12/18/2020 11/22/20  12/22/20  Parul Roche NP         History  Past Medical History:   Diagnosis Date    Hypertension      No past surgical history on file.  Social History     Socioeconomic History    Marital status:      Spouse name: Not on file    Number of children: Not on file    Years of education: Not on file    Highest education level: Not on file   Occupational History    Not on file   Social Needs    Financial resource strain: Not on file    Food insecurity     Worry: Not on file     Inability: Not on file    Transportation needs     Medical: Not on file     Non-medical: Not on file   Tobacco Use    Smoking status: Never Smoker    Smokeless tobacco: Never Used   Substance and Sexual Activity    Alcohol use: Not Currently     Comment: no drinking for many years    Drug use: Never    Sexual activity: Not Currently   Lifestyle    Physical activity     Days per week: Not on file     Minutes per session: Not on file    Stress: Not on file   Relationships    Social connections     Talks on phone: Not on file     Gets together: Not on file     Attends Gnosticism service: Not on file     Active member of club or organization: Not on file     Attends meetings of clubs or organizations: Not on file     Relationship status: Not on file   Other Topics Concern    Not on file   Social History Narrative    Not on file         Allergies  Review of patient's allergies indicates:  No Known Allergies      Review of Systems   Review of Systems   Constitution: Positive for weight gain. Negative for decreased appetite, fever and weight loss.   HENT: Negative for congestion and nosebleeds.    Eyes: Negative for double vision, vision loss in left eye, vision loss in right eye and visual disturbance.   Cardiovascular: Negative for chest pain, claudication, cyanosis, dyspnea on exertion, irregular heartbeat, leg swelling, near-syncope, orthopnea, palpitations, paroxysmal nocturnal dyspnea and syncope.   Respiratory:  Negative for cough, hemoptysis, shortness of breath, sleep disturbances due to breathing, snoring, sputum production and wheezing.    Endocrine: Negative for cold intolerance and heat intolerance.   Skin: Negative for nail changes and rash.   Musculoskeletal: Negative for joint pain, muscle cramps, muscle weakness and myalgias.   Gastrointestinal: Negative for change in bowel habit, heartburn, hematemesis, hematochezia, hemorrhoids and melena.   Neurological: Positive for dizziness. Negative for focal weakness and headaches.         Physical Exam  Wt Readings from Last 1 Encounters:   12/18/20 70 kg (154 lb 3.4 oz)     BP Readings from Last 3 Encounters:   12/18/20 (!) 144/84   12/08/20 129/87   12/03/20 132/82     Pulse Readings from Last 1 Encounters:   12/18/20 (!) 47     Body mass index is 24.89 kg/m².    Physical Exam   Constitutional: He is oriented to person, place, and time. He appears well-developed.   Neck: Carotid bruit is not present.   JVP is low.   Cardiovascular: Normal rate, regular rhythm, S1 normal and S2 normal.   No murmur heard.  Pulses:       Carotid pulses are 2+ on the right side and 2+ on the left side.       Radial pulses are 2+ on the right side and 2+ on the left side.        Dorsalis pedis pulses are 2+ on the right side and 2+ on the left side.   Pulmonary/Chest: Breath sounds normal.   Abdominal: Soft. Bowel sounds are normal. He exhibits no distension, no pulsatile liver, no ascites and no mass.   Neurological: He is alert and oriented to person, place, and time.   Skin: Skin is warm and dry.   Vitals reviewed.                Assessment  1. Abnormal EKG  Inferior Q    2. Dilated cardiomyopathy  stable    3. Essential hypertension  Stable    4.  Sinus bradycardia  -asymptomatic        Plan and Discussion  This is that his stress test shows inferior infarct with small area of reversibility with ejection fraction of 35%.  His echocardiogram showed mild left ventricular dysfunction.   Given that he has inferior Q-waves and scar on his stress test and no angina, will proceed with medical management.  Will get Holter monitor to evaluate his bradycardia.    Follow Up  1 month    Time spent evaluating and treating patient 20 minutes with >50% of this time being face-to-face.     Khoa Cornell MD, F.A.C.C, F.S.C.A.I.

## 2020-12-28 ENCOUNTER — HOSPITAL ENCOUNTER (OUTPATIENT)
Dept: CARDIOLOGY | Facility: OTHER | Age: 80
Discharge: HOME | End: 2020-12-28
Attending: INTERNAL MEDICINE
Payer: MEDICARE

## 2020-12-28 DIAGNOSIS — R00.1 BRADYCARDIA: ICD-10-CM

## 2020-12-28 PROCEDURE — 93227 XTRNL ECG REC<48 HR R&I: CPT | Mod: ,,, | Performed by: INTERNAL MEDICINE

## 2020-12-28 PROCEDURE — 93227 HOLTER MONITOR - 24 HOUR (CUPID ONLY): ICD-10-PCS | Mod: ,,, | Performed by: INTERNAL MEDICINE

## 2020-12-28 PROCEDURE — 93226 XTRNL ECG REC<48 HR SCAN A/R: CPT

## 2020-12-29 ENCOUNTER — EXTERNAL HOME HEALTH (OUTPATIENT)
Dept: HOME HEALTH SERVICES | Facility: HOSPITAL | Age: 80
End: 2020-12-29
Payer: MEDICARE

## 2021-01-08 LAB
OHS CV EVENT MONITOR DAY: 0
OHS CV HOLTER LENGTH DECIMAL HOURS: 23.98
OHS CV HOLTER LENGTH HOURS: 23
OHS CV HOLTER LENGTH MINUTES: 59

## 2021-01-15 ENCOUNTER — OFFICE VISIT (OUTPATIENT)
Dept: CARDIOLOGY | Facility: CLINIC | Age: 81
End: 2021-01-15
Payer: MEDICARE

## 2021-01-15 VITALS
BODY MASS INDEX: 24.55 KG/M2 | SYSTOLIC BLOOD PRESSURE: 124 MMHG | DIASTOLIC BLOOD PRESSURE: 70 MMHG | HEART RATE: 51 BPM | OXYGEN SATURATION: 97 % | HEIGHT: 66 IN | WEIGHT: 152.75 LBS

## 2021-01-15 DIAGNOSIS — I42.0 DILATED CARDIOMYOPATHY: ICD-10-CM

## 2021-01-15 DIAGNOSIS — R94.31 ABNORMAL EKG: Primary | ICD-10-CM

## 2021-01-15 DIAGNOSIS — I10 ESSENTIAL HYPERTENSION: ICD-10-CM

## 2021-01-15 PROCEDURE — 99999 PR PBB SHADOW E&M-EST. PATIENT-LVL III: ICD-10-PCS | Mod: PBBFAC,,, | Performed by: INTERNAL MEDICINE

## 2021-01-15 PROCEDURE — 99213 OFFICE O/P EST LOW 20 MIN: CPT | Mod: PBBFAC | Performed by: INTERNAL MEDICINE

## 2021-01-15 PROCEDURE — 99999 PR PBB SHADOW E&M-EST. PATIENT-LVL III: CPT | Mod: PBBFAC,,, | Performed by: INTERNAL MEDICINE

## 2021-01-15 PROCEDURE — 99214 OFFICE O/P EST MOD 30 MIN: CPT | Mod: S$PBB,,, | Performed by: INTERNAL MEDICINE

## 2021-01-15 PROCEDURE — 99214 PR OFFICE/OUTPT VISIT, EST, LEVL IV, 30-39 MIN: ICD-10-PCS | Mod: S$PBB,,, | Performed by: INTERNAL MEDICINE

## 2021-08-31 ENCOUNTER — HOSPITAL ENCOUNTER (EMERGENCY)
Facility: OTHER | Age: 81
Discharge: HOME | End: 2021-08-31
Attending: EMERGENCY MEDICINE
Payer: MEDICARE

## 2021-08-31 VITALS
BODY MASS INDEX: 21.62 KG/M2 | SYSTOLIC BLOOD PRESSURE: 135 MMHG | WEIGHT: 146 LBS | TEMPERATURE: 98 F | OXYGEN SATURATION: 97 % | HEIGHT: 69 IN | DIASTOLIC BLOOD PRESSURE: 77 MMHG | RESPIRATION RATE: 20 BRPM | HEART RATE: 92 BPM

## 2021-08-31 DIAGNOSIS — I63.9 CEREBROVASCULAR ACCIDENT (CVA), UNSPECIFIED MECHANISM: Primary | ICD-10-CM

## 2021-08-31 PROCEDURE — 99282 EMERGENCY DEPT VISIT SF MDM: CPT

## 2021-09-07 PROBLEM — G81.91 ACUTE RIGHT HEMIPARESIS: Status: ACTIVE | Noted: 2021-09-07

## 2021-09-07 PROBLEM — R53.1 WEAKNESS: Status: ACTIVE | Noted: 2020-11-20

## 2021-09-07 PROBLEM — I63.9 ACUTE ISCHEMIC STROKE: Status: ACTIVE | Noted: 2021-09-07

## 2021-09-07 PROBLEM — R29.898 WEAKNESS OF RIGHT LOWER EXTREMITY: Status: ACTIVE | Noted: 2021-09-07

## 2021-09-07 PROBLEM — I63.9 ACUTE ISCHEMIC STROKE: Status: RESOLVED | Noted: 2021-09-07 | Resolved: 2021-09-07

## 2021-09-22 ENCOUNTER — TELEPHONE (OUTPATIENT)
Dept: NEUROLOGY | Facility: CLINIC | Age: 81
End: 2021-09-22

## 2021-09-24 PROCEDURE — G0180 PR HOME HEALTH MD CERTIFICATION: ICD-10-PCS | Mod: ,,, | Performed by: PHYSICAL MEDICINE & REHABILITATION

## 2021-09-24 PROCEDURE — G0180 MD CERTIFICATION HHA PATIENT: HCPCS | Mod: ,,, | Performed by: PHYSICAL MEDICINE & REHABILITATION

## 2021-10-26 ENCOUNTER — EXTERNAL HOME HEALTH (OUTPATIENT)
Dept: HOME HEALTH SERVICES | Facility: HOSPITAL | Age: 81
End: 2021-10-26
Payer: MEDICARE

## 2021-10-28 ENCOUNTER — OFFICE VISIT (OUTPATIENT)
Dept: NEUROLOGY | Facility: CLINIC | Age: 81
End: 2021-10-28
Payer: MEDICARE

## 2021-10-28 VITALS
BODY MASS INDEX: 24.75 KG/M2 | SYSTOLIC BLOOD PRESSURE: 130 MMHG | HEART RATE: 61 BPM | DIASTOLIC BLOOD PRESSURE: 71 MMHG | HEIGHT: 64 IN | WEIGHT: 145 LBS

## 2021-10-28 DIAGNOSIS — R29.898 WEAKNESS OF RIGHT LOWER EXTREMITY: Primary | ICD-10-CM

## 2021-10-28 DIAGNOSIS — I50.33 ACUTE ON CHRONIC DIASTOLIC HEART FAILURE: ICD-10-CM

## 2021-10-28 DIAGNOSIS — I63.522: ICD-10-CM

## 2021-10-28 DIAGNOSIS — I50.40 HEART FAILURE WITH REDUCED EJECTION FRACTION AND DIASTOLIC DYSFUNCTION: ICD-10-CM

## 2021-10-28 PROCEDURE — 99999 PR PBB SHADOW E&M-EST. PATIENT-LVL III: CPT | Mod: PBBFAC,GC,, | Performed by: STUDENT IN AN ORGANIZED HEALTH CARE EDUCATION/TRAINING PROGRAM

## 2021-10-28 PROCEDURE — 99999 PR PBB SHADOW E&M-EST. PATIENT-LVL III: ICD-10-PCS | Mod: PBBFAC,GC,, | Performed by: STUDENT IN AN ORGANIZED HEALTH CARE EDUCATION/TRAINING PROGRAM

## 2021-10-28 PROCEDURE — 99204 OFFICE O/P NEW MOD 45 MIN: CPT | Mod: S$PBB,GC,, | Performed by: PSYCHIATRY & NEUROLOGY

## 2021-10-28 PROCEDURE — 99204 PR OFFICE/OUTPT VISIT, NEW, LEVL IV, 45-59 MIN: ICD-10-PCS | Mod: S$PBB,GC,, | Performed by: PSYCHIATRY & NEUROLOGY

## 2021-10-28 PROCEDURE — 99213 OFFICE O/P EST LOW 20 MIN: CPT | Mod: PBBFAC | Performed by: STUDENT IN AN ORGANIZED HEALTH CARE EDUCATION/TRAINING PROGRAM

## 2021-10-28 RX ORDER — ACETAMINOPHEN 325 MG/1
650 TABLET ORAL EVERY 8 HOURS PRN
COMMUNITY
Start: 2021-09-23

## 2021-11-23 ENCOUNTER — TELEPHONE (OUTPATIENT)
Dept: PHYSICAL MEDICINE AND REHAB | Facility: CLINIC | Age: 81
End: 2021-11-23
Payer: MEDICARE

## 2022-04-01 ENCOUNTER — HOSPITAL ENCOUNTER (EMERGENCY)
Facility: OTHER | Age: 82
Discharge: HOME | End: 2022-04-01
Attending: EMERGENCY MEDICINE
Payer: MEDICARE

## 2022-04-01 VITALS
RESPIRATION RATE: 20 BRPM | SYSTOLIC BLOOD PRESSURE: 130 MMHG | HEIGHT: 64 IN | BODY MASS INDEX: 27.14 KG/M2 | HEART RATE: 68 BPM | OXYGEN SATURATION: 98 % | WEIGHT: 159 LBS | DIASTOLIC BLOOD PRESSURE: 74 MMHG | TEMPERATURE: 98 F

## 2022-04-01 DIAGNOSIS — I69.30 HISTORY OF ISCHEMIC CEREBROVASCULAR ACCIDENT (CVA) WITH RESIDUAL DEFICIT: ICD-10-CM

## 2022-04-01 DIAGNOSIS — M62.81 RIGHT-SIDED MUSCLE WEAKNESS: ICD-10-CM

## 2022-04-01 DIAGNOSIS — Z63.6 CAREGIVER BURDEN: ICD-10-CM

## 2022-04-01 DIAGNOSIS — R63.4 WEIGHT LOSS, NON-INTENTIONAL: ICD-10-CM

## 2022-04-01 DIAGNOSIS — R00.2 PALPITATIONS: Primary | ICD-10-CM

## 2022-04-01 LAB
ALBUMIN SERPL BCP-MCNC: 3.9 G/DL (ref 3.5–5.2)
ALP SERPL-CCNC: 61 U/L (ref 55–135)
ALT SERPL W/O P-5'-P-CCNC: 10 U/L (ref 10–44)
ANION GAP SERPL CALC-SCNC: 7 MMOL/L (ref 8–16)
AST SERPL-CCNC: 17 U/L (ref 10–40)
BASOPHILS # BLD AUTO: 0.03 K/UL (ref 0–0.2)
BASOPHILS NFR BLD: 0.5 % (ref 0–1.9)
BILIRUB SERPL-MCNC: 0.8 MG/DL (ref 0.1–1)
BNP SERPL-MCNC: 866 PG/ML (ref 0–99)
BUN SERPL-MCNC: 20 MG/DL (ref 8–23)
CALCIUM SERPL-MCNC: 9.7 MG/DL (ref 8.7–10.5)
CHLORIDE SERPL-SCNC: 103 MMOL/L (ref 95–110)
CO2 SERPL-SCNC: 33 MMOL/L (ref 23–29)
CREAT SERPL-MCNC: 0.8 MG/DL (ref 0.5–1.4)
DIFFERENTIAL METHOD: ABNORMAL
EOSINOPHIL # BLD AUTO: 0.1 K/UL (ref 0–0.5)
EOSINOPHIL NFR BLD: 1.3 % (ref 0–8)
ERYTHROCYTE [DISTWIDTH] IN BLOOD BY AUTOMATED COUNT: 12 % (ref 11.5–14.5)
EST. GFR  (AFRICAN AMERICAN): >60 ML/MIN/1.73 M^2
EST. GFR  (NON AFRICAN AMERICAN): >60 ML/MIN/1.73 M^2
GLUCOSE SERPL-MCNC: 81 MG/DL (ref 70–110)
HCT VFR BLD AUTO: 41.4 % (ref 40–54)
HGB BLD-MCNC: 13.7 G/DL (ref 14–18)
IMM GRANULOCYTES # BLD AUTO: 0.02 K/UL (ref 0–0.04)
IMM GRANULOCYTES NFR BLD AUTO: 0.3 % (ref 0–0.5)
LYMPHOCYTES # BLD AUTO: 1 K/UL (ref 1–4.8)
LYMPHOCYTES NFR BLD: 16 % (ref 18–48)
MAGNESIUM SERPL-MCNC: 2 MG/DL (ref 1.6–2.6)
MCH RBC QN AUTO: 32.2 PG (ref 27–31)
MCHC RBC AUTO-ENTMCNC: 33.1 G/DL (ref 32–36)
MCV RBC AUTO: 97 FL (ref 82–98)
MONOCYTES # BLD AUTO: 0.7 K/UL (ref 0.3–1)
MONOCYTES NFR BLD: 10.4 % (ref 4–15)
NEUTROPHILS # BLD AUTO: 4.6 K/UL (ref 1.8–7.7)
NEUTROPHILS NFR BLD: 71.5 % (ref 38–73)
NRBC BLD-RTO: 0 /100 WBC
PLATELET # BLD AUTO: 237 K/UL (ref 150–450)
PMV BLD AUTO: 10.6 FL (ref 9.2–12.9)
POTASSIUM SERPL-SCNC: 4.1 MMOL/L (ref 3.5–5.1)
PROT SERPL-MCNC: 7.3 G/DL (ref 6–8.4)
RBC # BLD AUTO: 4.26 M/UL (ref 4.6–6.2)
SODIUM SERPL-SCNC: 143 MMOL/L (ref 136–145)
TROPONIN I SERPL DL<=0.01 NG/ML-MCNC: 0.02 NG/ML (ref 0–0.03)
TSH SERPL DL<=0.005 MIU/L-ACNC: 0.89 UIU/ML (ref 0.4–4)
WBC # BLD AUTO: 6.36 K/UL (ref 3.9–12.7)

## 2022-04-01 PROCEDURE — 84484 ASSAY OF TROPONIN QUANT: CPT | Performed by: EMERGENCY MEDICINE

## 2022-04-01 PROCEDURE — 83880 ASSAY OF NATRIURETIC PEPTIDE: CPT | Performed by: EMERGENCY MEDICINE

## 2022-04-01 PROCEDURE — 99285 EMERGENCY DEPT VISIT HI MDM: CPT | Mod: 25

## 2022-04-01 PROCEDURE — 93010 ELECTROCARDIOGRAM REPORT: CPT | Mod: ,,, | Performed by: INTERNAL MEDICINE

## 2022-04-01 PROCEDURE — 80053 COMPREHEN METABOLIC PANEL: CPT | Performed by: EMERGENCY MEDICINE

## 2022-04-01 PROCEDURE — 93010 EKG 12-LEAD: ICD-10-PCS | Mod: ,,, | Performed by: INTERNAL MEDICINE

## 2022-04-01 PROCEDURE — 93005 ELECTROCARDIOGRAM TRACING: CPT

## 2022-04-01 PROCEDURE — 84443 ASSAY THYROID STIM HORMONE: CPT | Performed by: EMERGENCY MEDICINE

## 2022-04-01 PROCEDURE — 85025 COMPLETE CBC W/AUTO DIFF WBC: CPT | Performed by: EMERGENCY MEDICINE

## 2022-04-01 PROCEDURE — 83735 ASSAY OF MAGNESIUM: CPT | Performed by: EMERGENCY MEDICINE

## 2022-04-01 SDOH — SOCIAL DETERMINANTS OF HEALTH (SDOH): DEPENDENT RELATIVE NEEDING CARE AT HOME: Z63.6

## 2022-04-01 NOTE — ED PROVIDER NOTES
"SCRIBE #1 NOTE: I, Olimpia Garbergas, am scribing for, and in the presence of, Parul Saizn MD.         Source of History:  the patient    Chief complaint:  Palpitations (Pt had a stroke 6 months ago and worried about his deficits. Pt c/o "I feel like my heart is racing". NOEMS states HR 80s. Denies chest pain and difficulty breathing.)      HPI:  Gregorio Bishop is a 81 y.o. male presenting with concerns regarding palpitations onset this morning. He describes feeling at baseline yesterday. Patient reports this morning when watching the news his heart rate rapidly increased for approximately five to ten minutes. He reports feeling near syncope. Patient has not eaten anything today as he normally does. Associated symptoms include cough and conspation. No dyspnea, chest pain, fever, or chills. No change in appetite. He also reports mild swelling of right ankle. Denies any fall. Patient describes being currently at baseline. Of note, patient had a stroke in August for which he was treated at [facility] and is currently doing physical therapy. He reports a recent medication change.     This is the extent to the patients complaints today here in the emergency department.    ROS: As per HPI and below:  General: No fever.  No chills. No appetite change.  Eyes: No visual changes.  ENT: No sore throat. No ear pain  Head: No headache.    Respiratory: No shortness of breath. Cough.  Cardiovascular: No chest pain. No dyspnea. Palpitations (resolved).  Abdomen: No abdominal pain.  No nausea or vomiting.  Genito-Urinary: No abnormal urination. Constipation.  Neurologic: No focal weakness.  No numbness. Near syncope (resolved).   MSK: no back pain. Mild swelling of right ankle.  Integument: No rashes or lesions.  Hematologic: No easy bruising.  Endocrine: No excessive thirst or urination.    Review of patient's allergies indicates:  No Known Allergies    PMH:  As per HPI and below:  Past Medical History:   Diagnosis Date    " "Hypertension     Stroke      History reviewed. No pertinent surgical history.    Social History     Tobacco Use    Smoking status: Never Smoker    Smokeless tobacco: Never Used   Substance Use Topics    Alcohol use: Not Currently     Comment: no alcohol for 6 months    Drug use: Never       Physical Exam:    /74 (BP Location: Right arm, Patient Position: Lying)   Pulse 68   Temp 97.7 °F (36.5 °C) (Oral)   Resp 20   Ht 5' 4" (1.626 m)   Wt 72.1 kg (159 lb)   SpO2 98%   BMI 27.29 kg/m²   Nursing note and vital signs reviewed.    Appearance: No acute distress. Thin.  Eyes: No conjunctival injection.  Neck: No deformity. No thyromegaly.  ENT: Oropharynx clear.  No stridor.   Chest/ Respiratory: Clear to auscultation bilaterally.  Good air movement.  No wheezes.  No rhonchi. No rales. No accessory muscle use.  Cardiovascular: Regular rate and rhythm.  No murmurs. No gallops. No rubs.  Abdomen: Soft.  Not distended.  Nontender.  No guarding.  No rebound. Non-peritoneal.  Musculoskeletal: Good range of motion all joints. Mild right sided weakness residual from stroke.  No deformities.  Neck supple.  No meningismus.  Skin: No rashes seen.  Good turgor.  No abrasions.  No ecchymoses.  Neurologic: Motor intact.  Sensation intact.  Cerebellar intact.  Cranial nerves intact.  Mental Status:  Alert and oriented x 3.  Appropriate, conversant.      I decided to obtain the patient's medical records.  Summary of Medical Records:  Patient's prior admissions reviewed.  The patient had a CVA last fall.      Last echocardiogram: 9/7/22  Summary    · The left ventricle is mildly enlarged with mild eccentric hypertrophy and severely decreased systolic function.  · The estimated ejection fraction is 20-25%.  · Grade I left ventricular diastolic dysfunction.  · Normal right ventricular size with normal right ventricular systolic function.  · Moderate left atrial enlargement.  · Mild tricuspid regurgitation.  · Normal " central venous pressure (3 mmHg).  · The estimated PA systolic pressure is 18 mmHg.  · Negative bubble study.        EKG:  I independently reviewed and interpreted the EKG and my findings are as follows:   Normal sinus rhythm with sinus arhythmia. Rate 65. T-Wave flattening. No ST Wave abnormality.     CXR:  I independently reviewed chest x-ray.  Noted persistent left hemidiaphragm elevation with basilar atelectasis without focal consolidation, pneumothorax or effusion.    Imaging Results          X-Ray Chest AP Portable (Final result)  Result time 04/01/22 15:34:05    Final result by Chapincito Shaw MD (04/01/22 15:34:05)                 Impression:      No acute chest disease identified.    Persistent elevation of the left hemidiaphragm with probable mild left basilar atelectasis.      Electronically signed by: Chapincito Shaw MD  Date:    04/01/2022  Time:    15:34             Narrative:    EXAMINATION:  XR CHEST AP PORTABLE    CLINICAL HISTORY:  Palpitations    TECHNIQUE:  Single frontal view of the chest was performed.    COMPARISON:  11/19/2020.    FINDINGS:  The heart and mediastinal structures are unchanged.  Pulmonary vasculature is within normal limits.  There is elevation of the left hemidiaphragm as before.  There is a thin linear opacity within the left lung base likely representing linear atelectasis.  The lungs are otherwise clear.  There is no evidence for pneumothorax or large pleural effusions.  Bony structures are grossly intact.                                  Results for orders placed or performed during the hospital encounter of 04/01/22   CBC auto differential   Result Value Ref Range    WBC 6.36 3.90 - 12.70 K/uL    RBC 4.26 (L) 4.60 - 6.20 M/uL    Hemoglobin 13.7 (L) 14.0 - 18.0 g/dL    Hematocrit 41.4 40.0 - 54.0 %    MCV 97 82 - 98 fL    MCH 32.2 (H) 27.0 - 31.0 pg    MCHC 33.1 32.0 - 36.0 g/dL    RDW 12.0 11.5 - 14.5 %    Platelets 237 150 - 450 K/uL    MPV 10.6 9.2 - 12.9 fL    Immature  Granulocytes 0.3 0.0 - 0.5 %    Gran # (ANC) 4.6 1.8 - 7.7 K/uL    Immature Grans (Abs) 0.02 0.00 - 0.04 K/uL    Lymph # 1.0 1.0 - 4.8 K/uL    Mono # 0.7 0.3 - 1.0 K/uL    Eos # 0.1 0.0 - 0.5 K/uL    Baso # 0.03 0.00 - 0.20 K/uL    nRBC 0 0 /100 WBC    Gran % 71.5 38.0 - 73.0 %    Lymph % 16.0 (L) 18.0 - 48.0 %    Mono % 10.4 4.0 - 15.0 %    Eosinophil % 1.3 0.0 - 8.0 %    Basophil % 0.5 0.0 - 1.9 %    Differential Method Automated    Comprehensive metabolic panel   Result Value Ref Range    Sodium 143 136 - 145 mmol/L    Potassium 4.1 3.5 - 5.1 mmol/L    Chloride 103 95 - 110 mmol/L    CO2 33 (H) 23 - 29 mmol/L    Glucose 81 70 - 110 mg/dL    BUN 20 8 - 23 mg/dL    Creatinine 0.8 0.5 - 1.4 mg/dL    Calcium 9.7 8.7 - 10.5 mg/dL    Total Protein 7.3 6.0 - 8.4 g/dL    Albumin 3.9 3.5 - 5.2 g/dL    Total Bilirubin 0.8 0.1 - 1.0 mg/dL    Alkaline Phosphatase 61 55 - 135 U/L    AST 17 10 - 40 U/L    ALT 10 10 - 44 U/L    Anion Gap 7 (L) 8 - 16 mmol/L    eGFR if African American >60 >60 mL/min/1.73 m^2    eGFR if non African American >60 >60 mL/min/1.73 m^2   Troponin I #1   Result Value Ref Range    Troponin I 0.024 0.000 - 0.026 ng/mL   B-Type natriuretic peptide (BNP)   Result Value Ref Range     (H) 0 - 99 pg/mL   Magnesium   Result Value Ref Range    Magnesium 2.0 1.6 - 2.6 mg/dL   TSH   Result Value Ref Range    TSH 0.890 0.400 - 4.000 uIU/mL         Initial Impression  81 y.o. male with episode of palpitations lasting approximately 5-10 minutes this morning following recent medication change for HTN. No obvious triggers. Patient is back at baseline.  Plan: Check for labs include thyroid studies, electrolytes and cardiac enzymes.    Differential Dx:  SVT, atrial fibrillation, atrial flutter, ventricular arrhythmia, heart block, MI, orthostatic hypotension, sinus tachycardia, sensitive carotid sinus, stimulant abuse or side effect, electrolyte abnormalities, hyperthyroidism, anxiety.    MDM:      ED Course as  of 04/02/22 1050   Fri Apr 01, 2022   1400 Patients daughter reports issues with his wife are causing him stress, which she believes is greatly affecting him. [NV]   1558 Patient improved with treatment in the emergency department and comfortable going home. Discussed reasons to return and importance of followup.  Patient understands that the emergency visit today is primarily to address immediate concerns and to rule out emergent cause of symptoms and that they may require further workup and evaluation as an outpatient. All questions addressed and patient given discharge instructions and followup information.  [NV]      ED Course User Index  [NV] Olimpia Mendes          Scribe Attestation:   Scribe #1: I performed the above scribed service and the documentation accurately describes the services I performed. I attest to the accuracy of the note.    Physician Attestation for Scribe: I, Parul Sainz MD, reviewed documentation as scribed in my presence, which is both accurate and complete.    Diagnostic Impression:    1. Palpitations    2. Caregiver burden    3. Right-sided muscle weakness    4. History of ischemic cerebrovascular accident (CVA) with residual deficit    5. Weight loss, non-intentional         ED Disposition Condition    Discharge Stable          ED Prescriptions     None        Follow-up Information     Follow up With Specialties Details Why Contact Info Additional Information    [facility] Cardiology Schedule an appointment as soon as possible for a visit in 3 days  [address], [address]  [facility] [address] [phone] [facility], [address], [address] Please park in [address] and use [place] elevators           Parul Sainz MD  04/02/22 1054

## 2022-04-01 NOTE — ED NOTES
Pt awake and alert; resting quietly on stretcher.  Pt remains on continuous cardiac and pulse ox monitoring with non-invasive blood pressure to cycle every 30 minutes.  VS stable; NSR noted. Pt denies pain at this time; denies palpitations. No acute distress or discomfort reported or observed.  Pt denies restroom needs at this time; urinal remains at bedside. Pt is able to reposition self on stretcher. Bed locked in lowest position; side rails up and locked x 2; call light, bedside table, and personal belongings within reach. Room assessed for safety measures and cleanliness; no action needed at this time. Plan of care discussed; pt awaiting results.  Pt instructed to alert nurse for assistance and before attempting to get out of bed; verbalizes understanding. Pt denies needs or complaints at this time; will continue to monitor.

## 2022-04-01 NOTE — ED NOTES
Bed: [facility] 02  Expected date:   Expected time:   Means of arrival:   Comments:  80 M heart racing, VSS, NAD

## 2022-04-06 ENCOUNTER — OFFICE VISIT (OUTPATIENT)
Dept: CARDIOLOGY | Facility: CLINIC | Age: 82
End: 2022-04-06
Payer: MEDICARE

## 2022-04-06 VITALS
OXYGEN SATURATION: 96 % | SYSTOLIC BLOOD PRESSURE: 130 MMHG | BODY MASS INDEX: 22.02 KG/M2 | WEIGHT: 129 LBS | HEART RATE: 75 BPM | DIASTOLIC BLOOD PRESSURE: 74 MMHG | HEIGHT: 64 IN

## 2022-04-06 DIAGNOSIS — R54 FRAIL ELDERLY: ICD-10-CM

## 2022-04-06 DIAGNOSIS — R00.2 PALPITATIONS: ICD-10-CM

## 2022-04-06 DIAGNOSIS — I63.9 CEREBROVASCULAR ACCIDENT (CVA), UNSPECIFIED MECHANISM: ICD-10-CM

## 2022-04-06 DIAGNOSIS — I10 ESSENTIAL HYPERTENSION: ICD-10-CM

## 2022-04-06 DIAGNOSIS — R79.89 ELEVATED TROPONIN: ICD-10-CM

## 2022-04-06 DIAGNOSIS — I42.0 DILATED CARDIOMYOPATHY: Primary | ICD-10-CM

## 2022-04-06 PROCEDURE — 99999 PR PBB SHADOW E&M-EST. PATIENT-LVL III: CPT | Mod: PBBFAC,,, | Performed by: INTERNAL MEDICINE

## 2022-04-06 PROCEDURE — 99214 PR OFFICE/OUTPT VISIT, EST, LEVL IV, 30-39 MIN: ICD-10-PCS | Mod: S$PBB,,, | Performed by: INTERNAL MEDICINE

## 2022-04-06 PROCEDURE — 99214 OFFICE O/P EST MOD 30 MIN: CPT | Mod: S$PBB,,, | Performed by: INTERNAL MEDICINE

## 2022-04-06 PROCEDURE — 99999 PR PBB SHADOW E&M-EST. PATIENT-LVL III: ICD-10-PCS | Mod: PBBFAC,,, | Performed by: INTERNAL MEDICINE

## 2022-04-06 PROCEDURE — 99213 OFFICE O/P EST LOW 20 MIN: CPT | Mod: PBBFAC | Performed by: INTERNAL MEDICINE

## 2022-04-06 RX ORDER — LOSARTAN POTASSIUM 25 MG/1
25 TABLET ORAL DAILY
Qty: 90 TABLET | Refills: 3 | Status: SHIPPED | OUTPATIENT
Start: 2022-04-06 | End: 2022-11-21 | Stop reason: SDUPTHER
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
## 2022-04-14 ENCOUNTER — CLINICAL SUPPORT (OUTPATIENT)
Dept: CARDIOLOGY | Facility: HOSPITAL | Age: 82
End: 2022-04-14
Attending: INTERNAL MEDICINE
Payer: MEDICARE

## 2022-04-14 DIAGNOSIS — R00.2 PALPITATIONS: ICD-10-CM

## 2022-04-14 PROCEDURE — 93270 REMOTE 30 DAY ECG REV/REPORT: CPT

## 2022-04-14 PROCEDURE — 93272 ECG/REVIEW INTERPRET ONLY: CPT | Mod: ,,, | Performed by: STUDENT IN AN ORGANIZED HEALTH CARE EDUCATION/TRAINING PROGRAM

## 2022-04-14 PROCEDURE — 93272 CARDIAC EVENT MONITOR (CUPID ONLY): ICD-10-PCS | Mod: ,,, | Performed by: STUDENT IN AN ORGANIZED HEALTH CARE EDUCATION/TRAINING PROGRAM

## 2022-04-19 ENCOUNTER — TELEPHONE (OUTPATIENT)
Dept: CARDIOLOGY | Facility: CLINIC | Age: 82
End: 2022-04-19
Payer: MEDICARE

## 2022-04-19 NOTE — TELEPHONE ENCOUNTER
----- Message from Sanjuana Gill sent at 4/19/2022  3:26 PM CDT -----  Who Called: Riana (Daughter)    What is the request in detail: Would like to speak to staff in regards to the pt's arrhythmia appt on 04/14. Please advise.     Can the clinic reply by [facility]?: No    Best Call Back Number: [phone]

## 2022-05-26 ENCOUNTER — OFFICE VISIT (OUTPATIENT)
Dept: CARDIOLOGY | Facility: CLINIC | Age: 82
End: 2022-05-26
Payer: MEDICARE

## 2022-05-26 VITALS
WEIGHT: 130 LBS | HEIGHT: 64 IN | OXYGEN SATURATION: 98 % | BODY MASS INDEX: 22.2 KG/M2 | SYSTOLIC BLOOD PRESSURE: 124 MMHG | HEART RATE: 67 BPM | DIASTOLIC BLOOD PRESSURE: 76 MMHG

## 2022-05-26 DIAGNOSIS — I10 ESSENTIAL HYPERTENSION: ICD-10-CM

## 2022-05-26 DIAGNOSIS — I42.0 DILATED CARDIOMYOPATHY: ICD-10-CM

## 2022-05-26 DIAGNOSIS — R94.31 ABNORMAL EKG: Primary | ICD-10-CM

## 2022-05-26 PROCEDURE — 99214 PR OFFICE/OUTPT VISIT, EST, LEVL IV, 30-39 MIN: ICD-10-PCS | Mod: S$PBB,,, | Performed by: INTERNAL MEDICINE

## 2022-05-26 PROCEDURE — 99213 OFFICE O/P EST LOW 20 MIN: CPT | Mod: PBBFAC | Performed by: INTERNAL MEDICINE

## 2022-05-26 PROCEDURE — 99214 OFFICE O/P EST MOD 30 MIN: CPT | Mod: S$PBB,,, | Performed by: INTERNAL MEDICINE

## 2022-05-26 PROCEDURE — 99999 PR PBB SHADOW E&M-EST. PATIENT-LVL III: CPT | Mod: PBBFAC,,, | Performed by: INTERNAL MEDICINE

## 2022-05-26 PROCEDURE — 99999 PR PBB SHADOW E&M-EST. PATIENT-LVL III: ICD-10-PCS | Mod: PBBFAC,,, | Performed by: INTERNAL MEDICINE

## 2022-05-26 NOTE — PROGRESS NOTES
Cardiology    5/26/2022  2:18 PM    Problem list  Patient Active Problem List   Diagnosis    Elevated troponin    Dizziness    Essential hypertension    Weakness    Frequent falls    Dilated cardiomyopathy    Abnormal EKG    Acute right hemiparesis    Weakness of right lower extremity    Stroke    Frail elderly       CC:  Follow-up    HPI:  He is tolerating losartan 25 mg. His blood pressure is 120 systolic at home per the son.  He denies any angina or paroxysmal nocturnal dyspnea.    Medications  Current Outpatient Medications   Medication Sig Dispense Refill    losartan (COZAAR) 25 MG tablet Take 1 tablet (25 mg total) by mouth once daily. 90 tablet 3    acetaminophen (TYLENOL) 325 MG tablet Take 650 mg by mouth every 8 (eight) hours as needed.      atorvastatin (LIPITOR) 80 MG tablet Take 1 tablet (80 mg total) by mouth every evening. (Patient not taking: No sig reported) 90 tablet 3    VITAMIN B-12 1000 MCG tablet TAKE ONE TABLET BY MOUTH ONCE DAILY (Patient not taking: Reported on 5/26/2022) 30 tablet 0     No current facility-administered medications for this visit.      Prior to Admission medications    Medication Sig Start Date End Date Taking? Authorizing Provider   losartan (COZAAR) 25 MG tablet Take 1 tablet (25 mg total) by mouth once daily. 4/6/22 4/6/23 Yes Khoa Cornell MD   acetaminophen (TYLENOL) 325 MG tablet Take 650 mg by mouth every 8 (eight) hours as needed. 9/23/21   Historical Provider   atorvastatin (LIPITOR) 80 MG tablet Take 1 tablet (80 mg total) by mouth every evening.  Patient not taking: No sig reported 9/8/21 9/8/22  Brenda Fisher NP   VITAMIN B-12 1000 MCG tablet TAKE ONE TABLET BY MOUTH ONCE DAILY  Patient not taking: Reported on 5/26/2022 9/5/21   Parul Roche NP   aspirin (ECOTRIN) 81 MG EC tablet Take 1 tablet (81 mg total) by mouth once daily. 12/3/20 5/26/22  Khoa Cornell MD         History  Past Medical History:   Diagnosis Date     Hypertension     Stroke      No past surgical history on file.  Social History     Socioeconomic History    Marital status:    Tobacco Use    Smoking status: Never Smoker    Smokeless tobacco: Never Used   Substance and Sexual Activity    Alcohol use: Not Currently     Comment: no alcohol for 6 months    Drug use: Never    Sexual activity: Not Currently         Allergies  Review of patient's allergies indicates:  No Known Allergies      Review of Systems   Review of Systems   Constitutional: Positive for weight loss. Negative for decreased appetite, fever and weight gain.   HENT: Negative for congestion and nosebleeds.    Eyes: Negative for double vision, vision loss in left eye, vision loss in right eye and visual disturbance.   Cardiovascular: Negative for chest pain, claudication, cyanosis, dyspnea on exertion, irregular heartbeat, leg swelling, near-syncope, orthopnea, palpitations, paroxysmal nocturnal dyspnea and syncope.   Respiratory: Negative for cough, hemoptysis, shortness of breath, sleep disturbances due to breathing, snoring, sputum production and wheezing.    Endocrine: Negative for cold intolerance and heat intolerance.   Skin: Negative for nail changes and rash.   Musculoskeletal: Negative for joint pain, muscle cramps, muscle weakness and myalgias.   Gastrointestinal: Negative for change in bowel habit, heartburn, hematemesis, hematochezia, hemorrhoids and melena.   Neurological: Positive for dizziness. Negative for focal weakness and headaches.         Physical Exam  Wt Readings from Last 1 Encounters:   05/26/22 59 kg (130 lb)     BP Readings from Last 3 Encounters:   05/26/22 124/76   04/06/22 130/74   04/01/22 130/74     Pulse Readings from Last 1 Encounters:   05/26/22 67     Body mass index is 22.31 kg/m².    Physical Exam  Vitals reviewed.   Constitutional:       Appearance: He is well-developed.   Neck:      Vascular: No carotid bruit.      Comments: HEIDI is  low.  Cardiovascular:      Rate and Rhythm: Normal rate and regular rhythm.      Pulses:           Carotid pulses are 2+ on the right side and 2+ on the left side.       Radial pulses are 2+ on the right side and 2+ on the left side.        Dorsalis pedis pulses are 2+ on the right side and 2+ on the left side.      Heart sounds: S1 normal and S2 normal. No murmur heard.  Pulmonary:      Breath sounds: Normal breath sounds.   Abdominal:      General: Bowel sounds are normal. There is no distension.      Palpations: Abdomen is soft. There is no mass.   Skin:     General: Skin is warm and dry.   Neurological:      Mental Status: He is alert and oriented to person, place, and time.             Assessment  1. Abnormal EKG  Stable    2. Dilated cardiomyopathy  Stable    3. Essential hypertension  Well controlled on losartan        Plan and Discussion  Continue losartan 25 mg daily.    Follow Up  3 months      Khoa Cornell MD, F.A.C.C, F.S.C.A.I.

## 2022-11-07 NOTE — PROGRESS NOTES
[facility] Note    Chief Complaint      Chief Complaint   Patient presents with    Leg Pain    Annual Exam       History of Present Illness      Gregorio Bishop is a 82 y.o. male new patient to me who presents today for   Chief Complaint   Patient presents with    Leg Pain    Annual Exam         HPI   Mr. Bishop is a very pleasant 81 y/o male who presents to clinic for right foot edema and right calf pain. He is present with his daughter, Riana, and his son. For all future purposes, Riana should be called for all appointments, results, etc.. Patient had a heart attack and stroke last year, according to his daughter. He was being followed by [facility]. His family would like to switch his care to be under [facility]. Last holter monitor reading, 05/27/22, results were: Baseline rhythm was normal sinus rhythm with normal intervals, frequent PVCs, and an initial heart rate of 70 bpm. Patient is currently taking losartan 25 mg daily. BP is 130/82. He ambulates with assistance of a rollator walker. Right foot is cold to touch with non pitting edema noted. Pedal pulse and dorsalis pedis pulse are palpable. Capillary refill is less than 2 seconds.   Daughter reports patient lives with his wife (who may or may not have beginning stages of dementia, according to daughter) and his son, who has schizophrenia. Riana is the caregiver for both her parents and her mother cares for her brother at home.    I have reviewed past lab results, holter monitor readings, MRI of head. I have discussed ultrasound of right leg to rule out DVT. I have ordered labs as a baseline for established primary care. I will refer to neurology. Patient reports light touch to his feet when I was not touching his feet for a brief sense of touch test. Family and patient agree with this plan of care.  Review of Systems   Constitutional: Negative.    HENT: Negative.     Eyes:  Positive for blurred vision.   Respiratory:  Negative.     Cardiovascular:         + right foot swelling   Gastrointestinal: Negative.    Genitourinary: Negative.    Musculoskeletal:         + lower extremity weakness   Skin: Negative.    Neurological:  Positive for weakness.   Endo/Heme/Allergies: Negative.    Psychiatric/Behavioral: Negative.     All 12 systems otherwise negative.     Family History:  family history includes Alcohol abuse in his father; Hypertension in his mother, paternal aunt, paternal grandfather, and paternal grandmother; No Known Problems in his daughter, sister, and son.   Family history was reviewed with patient.     Medications:  Outpatient Encounter Medications as of 11/21/2022   Medication Sig Dispense Refill    acetaminophen (TYLENOL) 325 MG tablet Take 650 mg by mouth every 8 (eight) hours as needed.      [DISCONTINUED] atorvastatin (LIPITOR) 80 MG tablet Take 1 tablet (80 mg total) by mouth every evening. 90 tablet 3    [DISCONTINUED] losartan (COZAAR) 25 MG tablet Take 1 tablet (25 mg total) by mouth once daily. 90 tablet 3    [DISCONTINUED] VITAMIN B-12 1000 MCG tablet TAKE ONE TABLET BY MOUTH ONCE DAILY 30 tablet 0    atorvastatin (LIPITOR) 80 MG tablet Take 1 tablet (80 mg total) by mouth every evening. 90 tablet 3    cyanocobalamin (VITAMIN B-12) 1000 MCG tablet Take 1 tablet (1,000 mcg total) by mouth once daily. 30 tablet 0    losartan (COZAAR) 25 MG tablet Take 1 tablet (25 mg total) by mouth once daily. 90 tablet 3     No facility-administered encounter medications on file as of 11/21/2022.       Allergies:  Review of patient's allergies indicates:  No Known Allergies    Health Maintenance:  Health Maintenance   Topic Date Due    TETANUS VACCINE  Never done    Lipid Panel  09/07/2026     Health Maintenance Topics with due status: Not Due       Topic Last Completion Date    Lipid Panel 09/07/2021       Physical Exam      Vital Signs  Temp: 97.7 °F (36.5 °C)  Temp src: Oral  Pulse: 82  SpO2: 99 %  BP: 130/82  BP Location:  "Right arm  Patient Position: Sitting  Pain Score: 0-No pain  Height and Weight  Height: 5' 4" (162.6 cm)  Weight: 60.1 kg (132 lb 7.9 oz)  BSA (Calculated - sq m): 1.65 sq meters  BMI (Calculated): 22.7  Weight in (lb) to have BMI = 25: 145.3]    Physical Exam  Constitutional:       Appearance: Normal appearance. He is normal weight.   HENT:      Head: Normocephalic and atraumatic.      Nose: Nose normal.      Mouth/Throat:      Mouth: Mucous membranes are moist.      Pharynx: Oropharynx is clear.   Eyes:      Extraocular Movements: Extraocular movements intact.      Conjunctiva/sclera: Conjunctivae normal.      Pupils: Pupils are equal, round, and reactive to light.   Cardiovascular:      Rate and Rhythm: Normal rate and regular rhythm.      Pulses: Normal pulses.      Heart sounds: Murmur heard.   Pulmonary:      Effort: Pulmonary effort is normal.      Breath sounds: Normal breath sounds.   Abdominal:      General: Abdomen is flat. Bowel sounds are normal.      Palpations: Abdomen is soft.   Musculoskeletal:      Cervical back: Normal range of motion and neck supple.      Right lower leg: Edema present.      Comments: + ambulates with assistance of rollator walker  + non pitting edema to right foot   Skin:     General: Skin is warm and dry.      Capillary Refill: Capillary refill takes less than 2 seconds.      Comments: + right foot cold to touch   Neurological:      General: No focal deficit present.      Mental Status: He is alert and oriented to person, place, and time. Mental status is at baseline.   Psychiatric:         Mood and Affect: Mood normal.         Behavior: Behavior normal.         Thought Content: Thought content normal.         Judgment: Judgment normal.          Assessment/Plan     Gregorio Bishop is a 82 y.o.male with:    Fatigue, unspecified type  -     CBC Auto Differential; Future; Expected date: 11/21/2022  -     T4, Free; Future; Expected date: 11/21/2022  -     TSH; Future; Expected " date: 11/21/2022  -     cyanocobalamin (VITAMIN B-12) 1000 MCG tablet; Take 1 tablet (1,000 mcg total) by mouth once daily.  Dispense: 30 tablet; Refill: 0    Routine medical exam  -     Comprehensive Metabolic Panel; Future; Expected date: 11/21/2022    Prediabetes  -     Hemoglobin A1C; Future; Expected date: 11/21/2022    Dyslipidemia  -     Lipid Panel; Future; Expected date: 11/21/2022  -     atorvastatin (LIPITOR) 80 MG tablet; Take 1 tablet (80 mg total) by mouth every evening.  Dispense: 90 tablet; Refill: 3    Right leg pain  -     CV Ultrasound doppler venous DVT leg right; Future; Expected date: 11/21/2022    Weakness of lower extremity, unspecified laterality  -     Ambulatory referral/consult to Neurology; Future; Expected date: 11/28/2022    Blurred vision  -     Ambulatory referral/consult to Optometry; Future; Expected date: 11/28/2022    Essential hypertension  -     losartan (COZAAR) 25 MG tablet; Take 1 tablet (25 mg total) by mouth once daily.  Dispense: 90 tablet; Refill: 3      As above, continue current medications and maintain follow up with specialists.  Return to clinic as needed.    I spent 50 minutes on the day of this encounter for preparing, evaluating, examining, treating, and discussing plan of care with this patient.  Greater than 50% of this time was spent face to face with patient.  All questions were answered to patient's satisfaction.         Karen L Spencer, NP-C [facility]

## 2022-11-21 ENCOUNTER — OFFICE VISIT (OUTPATIENT)
Dept: PRIMARY CARE CLINIC | Facility: CLINIC | Age: 82
End: 2022-11-21
Payer: MEDICARE

## 2022-11-21 VITALS
HEIGHT: 64 IN | HEART RATE: 82 BPM | OXYGEN SATURATION: 99 % | SYSTOLIC BLOOD PRESSURE: 130 MMHG | BODY MASS INDEX: 22.62 KG/M2 | DIASTOLIC BLOOD PRESSURE: 82 MMHG | TEMPERATURE: 98 F | WEIGHT: 132.5 LBS

## 2022-11-21 DIAGNOSIS — R29.898 WEAKNESS OF LOWER EXTREMITY, UNSPECIFIED LATERALITY: ICD-10-CM

## 2022-11-21 DIAGNOSIS — E78.5 DYSLIPIDEMIA: ICD-10-CM

## 2022-11-21 DIAGNOSIS — M79.604 RIGHT LEG PAIN: ICD-10-CM

## 2022-11-21 DIAGNOSIS — R73.03 PREDIABETES: ICD-10-CM

## 2022-11-21 DIAGNOSIS — I10 ESSENTIAL HYPERTENSION: ICD-10-CM

## 2022-11-21 DIAGNOSIS — Z00.00 ROUTINE MEDICAL EXAM: ICD-10-CM

## 2022-11-21 DIAGNOSIS — R53.83 FATIGUE, UNSPECIFIED TYPE: Primary | ICD-10-CM

## 2022-11-21 DIAGNOSIS — H53.8 BLURRED VISION: ICD-10-CM

## 2022-11-21 PROCEDURE — 99215 PR OFFICE/OUTPT VISIT, EST, LEVL V, 40-54 MIN: ICD-10-PCS | Mod: S$PBB,,, | Performed by: NURSE PRACTITIONER

## 2022-11-21 PROCEDURE — 99215 OFFICE O/P EST HI 40 MIN: CPT | Mod: PBBFAC,PN | Performed by: NURSE PRACTITIONER

## 2022-11-21 PROCEDURE — 99215 OFFICE O/P EST HI 40 MIN: CPT | Mod: S$PBB,,, | Performed by: NURSE PRACTITIONER

## 2022-11-21 PROCEDURE — 99999 PR PBB SHADOW E&M-EST. PATIENT-LVL V: CPT | Mod: PBBFAC,,, | Performed by: NURSE PRACTITIONER

## 2022-11-21 PROCEDURE — 99999 PR PBB SHADOW E&M-EST. PATIENT-LVL V: ICD-10-PCS | Mod: PBBFAC,,, | Performed by: NURSE PRACTITIONER

## 2022-11-21 RX ORDER — LOSARTAN POTASSIUM 25 MG/1
25 TABLET ORAL DAILY
Qty: 90 TABLET | Refills: 3 | Status: ON HOLD | OUTPATIENT
Start: 2022-11-21 | End: 2022-12-04 | Stop reason: HOSPADM

## 2022-11-21 RX ORDER — LANOLIN ALCOHOL/MO/W.PET/CERES
1000 CREAM (GRAM) TOPICAL DAILY
Qty: 30 TABLET | Refills: 0 | Status: SHIPPED | OUTPATIENT
Start: 2022-11-21

## 2022-11-21 RX ORDER — ATORVASTATIN CALCIUM 80 MG/1
80 TABLET, FILM COATED ORAL NIGHTLY
Qty: 90 TABLET | Refills: 3 | Status: SHIPPED | OUTPATIENT
Start: 2022-11-21 | End: 2023-11-21

## 2022-11-22 ENCOUNTER — LAB VISIT (OUTPATIENT)
Dept: LAB | Facility: HOSPITAL | Age: 82
End: 2022-11-22
Attending: INTERNAL MEDICINE
Payer: MEDICARE

## 2022-11-22 DIAGNOSIS — R53.83 FATIGUE, UNSPECIFIED TYPE: ICD-10-CM

## 2022-11-22 DIAGNOSIS — Z00.00 ROUTINE MEDICAL EXAM: ICD-10-CM

## 2022-11-22 DIAGNOSIS — R73.03 PREDIABETES: ICD-10-CM

## 2022-11-22 DIAGNOSIS — E78.5 DYSLIPIDEMIA: ICD-10-CM

## 2022-11-22 LAB
ALBUMIN SERPL BCP-MCNC: 3.8 G/DL (ref 3.5–5.2)
ALP SERPL-CCNC: 70 U/L (ref 55–135)
ALT SERPL W/O P-5'-P-CCNC: 20 U/L (ref 10–44)
ANION GAP SERPL CALC-SCNC: 10 MMOL/L (ref 8–16)
AST SERPL-CCNC: 26 U/L (ref 10–40)
BASOPHILS # BLD AUTO: 0.07 K/UL (ref 0–0.2)
BASOPHILS NFR BLD: 0.7 % (ref 0–1.9)
BILIRUB SERPL-MCNC: 1.2 MG/DL (ref 0.1–1)
BUN SERPL-MCNC: 19 MG/DL (ref 8–23)
CALCIUM SERPL-MCNC: 9.9 MG/DL (ref 8.7–10.5)
CHLORIDE SERPL-SCNC: 107 MMOL/L (ref 95–110)
CHOLEST SERPL-MCNC: 120 MG/DL (ref 120–199)
CHOLEST/HDLC SERPL: 2.5 {RATIO} (ref 2–5)
CO2 SERPL-SCNC: 26 MMOL/L (ref 23–29)
CREAT SERPL-MCNC: 1.1 MG/DL (ref 0.5–1.4)
DIFFERENTIAL METHOD: ABNORMAL
EOSINOPHIL # BLD AUTO: 0 K/UL (ref 0–0.5)
EOSINOPHIL NFR BLD: 0.2 % (ref 0–8)
ERYTHROCYTE [DISTWIDTH] IN BLOOD BY AUTOMATED COUNT: 12.6 % (ref 11.5–14.5)
EST. GFR  (NO RACE VARIABLE): >60 ML/MIN/1.73 M^2
ESTIMATED AVG GLUCOSE: 91 MG/DL (ref 68–131)
GLUCOSE SERPL-MCNC: 144 MG/DL (ref 70–110)
HBA1C MFR BLD: 4.8 % (ref 4–5.6)
HCT VFR BLD AUTO: 42.8 % (ref 40–54)
HDLC SERPL-MCNC: 48 MG/DL (ref 40–75)
HDLC SERPL: 40 % (ref 20–50)
HGB BLD-MCNC: 13.7 G/DL (ref 14–18)
IMM GRANULOCYTES # BLD AUTO: 0.02 K/UL (ref 0–0.04)
IMM GRANULOCYTES NFR BLD AUTO: 0.2 % (ref 0–0.5)
LDLC SERPL CALC-MCNC: 58.4 MG/DL (ref 63–159)
LYMPHOCYTES # BLD AUTO: 1.6 K/UL (ref 1–4.8)
LYMPHOCYTES NFR BLD: 15.5 % (ref 18–48)
MCH RBC QN AUTO: 31.3 PG (ref 27–31)
MCHC RBC AUTO-ENTMCNC: 32 G/DL (ref 32–36)
MCV RBC AUTO: 98 FL (ref 82–98)
MONOCYTES # BLD AUTO: 0.9 K/UL (ref 0.3–1)
MONOCYTES NFR BLD: 9 % (ref 4–15)
NEUTROPHILS # BLD AUTO: 7.7 K/UL (ref 1.8–7.7)
NEUTROPHILS NFR BLD: 74.4 % (ref 38–73)
NONHDLC SERPL-MCNC: 72 MG/DL
NRBC BLD-RTO: 0 /100 WBC
PLATELET # BLD AUTO: 252 K/UL (ref 150–450)
PMV BLD AUTO: 11.3 FL (ref 9.2–12.9)
POTASSIUM SERPL-SCNC: 4.2 MMOL/L (ref 3.5–5.1)
PROT SERPL-MCNC: 7.5 G/DL (ref 6–8.4)
RBC # BLD AUTO: 4.38 M/UL (ref 4.6–6.2)
SODIUM SERPL-SCNC: 143 MMOL/L (ref 136–145)
T4 FREE SERPL-MCNC: 1.02 NG/DL (ref 0.71–1.51)
TRIGL SERPL-MCNC: 68 MG/DL (ref 30–150)
TSH SERPL DL<=0.005 MIU/L-ACNC: 1.74 UIU/ML (ref 0.4–4)
WBC # BLD AUTO: 10.34 K/UL (ref 3.9–12.7)

## 2022-11-22 PROCEDURE — 84439 ASSAY OF FREE THYROXINE: CPT | Performed by: NURSE PRACTITIONER

## 2022-11-22 PROCEDURE — 83036 HEMOGLOBIN GLYCOSYLATED A1C: CPT | Performed by: NURSE PRACTITIONER

## 2022-11-22 PROCEDURE — 80061 LIPID PANEL: CPT | Performed by: NURSE PRACTITIONER

## 2022-11-22 PROCEDURE — 80053 COMPREHEN METABOLIC PANEL: CPT | Performed by: NURSE PRACTITIONER

## 2022-11-22 PROCEDURE — 84443 ASSAY THYROID STIM HORMONE: CPT | Performed by: NURSE PRACTITIONER

## 2022-11-22 PROCEDURE — 85025 COMPLETE CBC W/AUTO DIFF WBC: CPT | Performed by: NURSE PRACTITIONER

## 2022-11-22 PROCEDURE — 36415 COLL VENOUS BLD VENIPUNCTURE: CPT | Mod: PN | Performed by: NURSE PRACTITIONER

## 2022-11-26 ENCOUNTER — HOSPITAL ENCOUNTER (OUTPATIENT)
Facility: OTHER | Age: 82
Discharge: NURSING, REHAB OR LONG-TERM CARE | End: 2022-11-28
Attending: EMERGENCY MEDICINE | Admitting: STUDENT IN AN ORGANIZED HEALTH CARE EDUCATION/TRAINING PROGRAM
Payer: MEDICARE

## 2022-11-26 DIAGNOSIS — R41.82 AMS (ALTERED MENTAL STATUS): ICD-10-CM

## 2022-11-26 DIAGNOSIS — I50.9 CHF (CONGESTIVE HEART FAILURE): ICD-10-CM

## 2022-11-26 DIAGNOSIS — R53.1 WEAKNESS: ICD-10-CM

## 2022-11-26 DIAGNOSIS — R93.0 ABNORMAL HEAD CT: ICD-10-CM

## 2022-11-26 DIAGNOSIS — I63.9 CEREBROVASCULAR ACCIDENT (CVA), UNSPECIFIED MECHANISM: ICD-10-CM

## 2022-11-26 DIAGNOSIS — I50.43 ACUTE ON CHRONIC COMBINED SYSTOLIC AND DIASTOLIC HEART FAILURE: Primary | ICD-10-CM

## 2022-11-26 PROBLEM — I69.351 HEMIPLGA FOLLOWING CEREBRAL INFRC AFF RIGHT DOMINANT SIDE: Status: ACTIVE | Noted: 2021-09-08

## 2022-11-26 LAB
ALBUMIN SERPL BCP-MCNC: 3.6 G/DL (ref 3.5–5.2)
ALP SERPL-CCNC: 71 U/L (ref 55–135)
ALT SERPL W/O P-5'-P-CCNC: 35 U/L (ref 10–44)
ANION GAP SERPL CALC-SCNC: 9 MMOL/L (ref 8–16)
AST SERPL-CCNC: 32 U/L (ref 10–40)
BACTERIA #/AREA URNS HPF: ABNORMAL /HPF
BASOPHILS # BLD AUTO: 0.04 K/UL (ref 0–0.2)
BASOPHILS NFR BLD: 0.4 % (ref 0–1.9)
BILIRUB SERPL-MCNC: 1.5 MG/DL (ref 0.1–1)
BILIRUB UR QL STRIP: NEGATIVE
BNP SERPL-MCNC: 2854 PG/ML (ref 0–99)
BUN SERPL-MCNC: 23 MG/DL (ref 8–23)
CALCIUM SERPL-MCNC: 9.1 MG/DL (ref 8.7–10.5)
CHLORIDE SERPL-SCNC: 109 MMOL/L (ref 95–110)
CLARITY UR: CLEAR
CO2 SERPL-SCNC: 25 MMOL/L (ref 23–29)
COLOR UR: YELLOW
CREAT SERPL-MCNC: 1.1 MG/DL (ref 0.5–1.4)
CRP SERPL-MCNC: 10.3 MG/L (ref 0–8.2)
CTP QC/QA: YES
CTP QC/QA: YES
DIFFERENTIAL METHOD: ABNORMAL
EOSINOPHIL # BLD AUTO: 0 K/UL (ref 0–0.5)
EOSINOPHIL NFR BLD: 0.3 % (ref 0–8)
ERYTHROCYTE [DISTWIDTH] IN BLOOD BY AUTOMATED COUNT: 12.3 % (ref 11.5–14.5)
EST. GFR  (NO RACE VARIABLE): >60 ML/MIN/1.73 M^2
FIO2: 21
GLUCOSE SERPL-MCNC: 132 MG/DL (ref 70–110)
GLUCOSE UR QL STRIP: NEGATIVE
HCT VFR BLD AUTO: 44.7 % (ref 40–54)
HGB BLD-MCNC: 14.9 G/DL (ref 14–18)
HGB UR QL STRIP: ABNORMAL
HYALINE CASTS #/AREA URNS LPF: 5 /LPF
IMM GRANULOCYTES # BLD AUTO: 0.05 K/UL (ref 0–0.04)
IMM GRANULOCYTES NFR BLD AUTO: 0.5 % (ref 0–0.5)
KETONES UR QL STRIP: NEGATIVE
LACTATE SERPL-SCNC: 1.4 MMOL/L (ref 0.5–2.2)
LACTATE SERPL-SCNC: 2.1 MMOL/L (ref 0.5–2.2)
LACTATE SERPL-SCNC: 2.2 MMOL/L (ref 0.5–2.2)
LDH SERPL L TO P-CCNC: 1.37 MMOL/L (ref 0.5–2.2)
LEUKOCYTE ESTERASE UR QL STRIP: NEGATIVE
LYMPHOCYTES # BLD AUTO: 1.3 K/UL (ref 1–4.8)
LYMPHOCYTES NFR BLD: 13 % (ref 18–48)
MAGNESIUM SERPL-MCNC: 2.1 MG/DL (ref 1.6–2.6)
MCH RBC QN AUTO: 31 PG (ref 27–31)
MCHC RBC AUTO-ENTMCNC: 33.3 G/DL (ref 32–36)
MCV RBC AUTO: 93 FL (ref 82–98)
MICROSCOPIC COMMENT: ABNORMAL
MONOCYTES # BLD AUTO: 0.8 K/UL (ref 0.3–1)
MONOCYTES NFR BLD: 8.1 % (ref 4–15)
NEUTROPHILS # BLD AUTO: 7.7 K/UL (ref 1.8–7.7)
NEUTROPHILS NFR BLD: 77.7 % (ref 38–73)
NITRITE UR QL STRIP: NEGATIVE
NRBC BLD-RTO: 0 /100 WBC
PH UR STRIP: 6 [PH] (ref 5–8)
PLATELET # BLD AUTO: 195 K/UL (ref 150–450)
PMV BLD AUTO: 11 FL (ref 9.2–12.9)
POC MOLECULAR INFLUENZA A AGN: NEGATIVE
POC MOLECULAR INFLUENZA B AGN: NEGATIVE
POTASSIUM SERPL-SCNC: 3.7 MMOL/L (ref 3.5–5.1)
PROCALCITONIN SERPL IA-MCNC: 0.09 NG/ML
PROT SERPL-MCNC: 7.1 G/DL (ref 6–8.4)
PROT UR QL STRIP: ABNORMAL
RBC # BLD AUTO: 4.81 M/UL (ref 4.6–6.2)
RBC #/AREA URNS HPF: 4 /HPF (ref 0–4)
SAMPLE: NORMAL
SARS-COV-2 RDRP RESP QL NAA+PROBE: NEGATIVE
SODIUM SERPL-SCNC: 143 MMOL/L (ref 136–145)
SP GR UR STRIP: 1.01 (ref 1–1.03)
TROPONIN I SERPL DL<=0.01 NG/ML-MCNC: 0.07 NG/ML (ref 0–0.03)
TROPONIN I SERPL DL<=0.01 NG/ML-MCNC: 0.08 NG/ML (ref 0–0.03)
URN SPEC COLLECT METH UR: ABNORMAL
UROBILINOGEN UR STRIP-ACNC: 1 EU/DL
WBC # BLD AUTO: 9.96 K/UL (ref 3.9–12.7)
WBC #/AREA URNS HPF: 1 /HPF (ref 0–5)

## 2022-11-26 PROCEDURE — 93005 ELECTROCARDIOGRAM TRACING: CPT

## 2022-11-26 PROCEDURE — 36415 COLL VENOUS BLD VENIPUNCTURE: CPT | Performed by: EMERGENCY MEDICINE

## 2022-11-26 PROCEDURE — 87635 SARS-COV-2 COVID-19 AMP PRB: CPT | Performed by: EMERGENCY MEDICINE

## 2022-11-26 PROCEDURE — 80053 COMPREHEN METABOLIC PANEL: CPT | Performed by: EMERGENCY MEDICINE

## 2022-11-26 PROCEDURE — 96372 THER/PROPH/DIAG INJ SC/IM: CPT | Performed by: NURSE PRACTITIONER

## 2022-11-26 PROCEDURE — G0378 HOSPITAL OBSERVATION PER HR: HCPCS

## 2022-11-26 PROCEDURE — 81000 URINALYSIS NONAUTO W/SCOPE: CPT | Performed by: EMERGENCY MEDICINE

## 2022-11-26 PROCEDURE — 99285 EMERGENCY DEPT VISIT HI MDM: CPT | Mod: 25

## 2022-11-26 PROCEDURE — 87040 BLOOD CULTURE FOR BACTERIA: CPT | Performed by: EMERGENCY MEDICINE

## 2022-11-26 PROCEDURE — 86140 C-REACTIVE PROTEIN: CPT | Performed by: EMERGENCY MEDICINE

## 2022-11-26 PROCEDURE — 96376 TX/PRO/DX INJ SAME DRUG ADON: CPT

## 2022-11-26 PROCEDURE — 96374 THER/PROPH/DIAG INJ IV PUSH: CPT

## 2022-11-26 PROCEDURE — 99220 PR INITIAL OBSERVATION CARE,LEVL III: ICD-10-PCS | Mod: ,,, | Performed by: NURSE PRACTITIONER

## 2022-11-26 PROCEDURE — 83735 ASSAY OF MAGNESIUM: CPT | Performed by: EMERGENCY MEDICINE

## 2022-11-26 PROCEDURE — 63600175 PHARM REV CODE 636 W HCPCS: Performed by: NURSE PRACTITIONER

## 2022-11-26 PROCEDURE — 85025 COMPLETE CBC W/AUTO DIFF WBC: CPT | Performed by: EMERGENCY MEDICINE

## 2022-11-26 PROCEDURE — 96361 HYDRATE IV INFUSION ADD-ON: CPT

## 2022-11-26 PROCEDURE — 96374 THER/PROPH/DIAG INJ IV PUSH: CPT | Mod: 59

## 2022-11-26 PROCEDURE — 83880 ASSAY OF NATRIURETIC PEPTIDE: CPT | Performed by: EMERGENCY MEDICINE

## 2022-11-26 PROCEDURE — 83605 ASSAY OF LACTIC ACID: CPT | Mod: 91 | Performed by: EMERGENCY MEDICINE

## 2022-11-26 PROCEDURE — 84484 ASSAY OF TROPONIN QUANT: CPT | Performed by: EMERGENCY MEDICINE

## 2022-11-26 PROCEDURE — 99220 PR INITIAL OBSERVATION CARE,LEVL III: CPT | Mod: ,,, | Performed by: NURSE PRACTITIONER

## 2022-11-26 PROCEDURE — 93010 ELECTROCARDIOGRAM REPORT: CPT | Mod: ,,, | Performed by: INTERNAL MEDICINE

## 2022-11-26 PROCEDURE — 93010 EKG 12-LEAD: ICD-10-PCS | Mod: ,,, | Performed by: INTERNAL MEDICINE

## 2022-11-26 PROCEDURE — 84484 ASSAY OF TROPONIN QUANT: CPT | Mod: 91 | Performed by: NURSE PRACTITIONER

## 2022-11-26 PROCEDURE — 99900035 HC TECH TIME PER 15 MIN (STAT)

## 2022-11-26 PROCEDURE — 83605 ASSAY OF LACTIC ACID: CPT | Mod: 91 | Performed by: NURSE PRACTITIONER

## 2022-11-26 PROCEDURE — 36415 COLL VENOUS BLD VENIPUNCTURE: CPT | Performed by: NURSE PRACTITIONER

## 2022-11-26 PROCEDURE — 63600175 PHARM REV CODE 636 W HCPCS: Performed by: EMERGENCY MEDICINE

## 2022-11-26 PROCEDURE — 83605 ASSAY OF LACTIC ACID: CPT

## 2022-11-26 PROCEDURE — 84145 PROCALCITONIN (PCT): CPT | Performed by: EMERGENCY MEDICINE

## 2022-11-26 RX ORDER — FUROSEMIDE 10 MG/ML
20 INJECTION INTRAMUSCULAR; INTRAVENOUS
Status: DISCONTINUED | OUTPATIENT
Start: 2022-11-26 | End: 2022-11-27

## 2022-11-26 RX ORDER — ATORVASTATIN CALCIUM 20 MG/1
80 TABLET, FILM COATED ORAL NIGHTLY
Status: DISCONTINUED | OUTPATIENT
Start: 2022-11-27 | End: 2022-11-28 | Stop reason: HOSPADM

## 2022-11-26 RX ORDER — SODIUM CHLORIDE 0.9 % (FLUSH) 0.9 %
10 SYRINGE (ML) INJECTION
Status: DISCONTINUED | OUTPATIENT
Start: 2022-11-26 | End: 2022-11-28 | Stop reason: HOSPADM

## 2022-11-26 RX ORDER — FUROSEMIDE 10 MG/ML
20 INJECTION INTRAMUSCULAR; INTRAVENOUS
Status: DISCONTINUED | OUTPATIENT
Start: 2022-11-26 | End: 2022-11-26

## 2022-11-26 RX ORDER — LOSARTAN POTASSIUM 25 MG/1
25 TABLET ORAL DAILY
Status: DISCONTINUED | OUTPATIENT
Start: 2022-11-27 | End: 2022-11-28 | Stop reason: HOSPADM

## 2022-11-26 RX ORDER — ASPIRIN 81 MG/1
81 TABLET ORAL DAILY
Status: DISCONTINUED | OUTPATIENT
Start: 2022-11-27 | End: 2022-11-28 | Stop reason: HOSPADM

## 2022-11-26 RX ORDER — FUROSEMIDE 10 MG/ML
20 INJECTION INTRAMUSCULAR; INTRAVENOUS
Status: COMPLETED | OUTPATIENT
Start: 2022-11-26 | End: 2022-11-26

## 2022-11-26 RX ORDER — HEPARIN SODIUM 5000 [USP'U]/ML
5000 INJECTION, SOLUTION INTRAVENOUS; SUBCUTANEOUS EVERY 8 HOURS
Status: DISCONTINUED | OUTPATIENT
Start: 2022-11-26 | End: 2022-11-28 | Stop reason: HOSPADM

## 2022-11-26 RX ADMIN — FUROSEMIDE 20 MG: 10 INJECTION, SOLUTION INTRAMUSCULAR; INTRAVENOUS at 10:11

## 2022-11-26 RX ADMIN — HEPARIN SODIUM 5000 UNITS: 5000 INJECTION INTRAVENOUS; SUBCUTANEOUS at 10:11

## 2022-11-26 RX ADMIN — FUROSEMIDE 20 MG: 10 INJECTION, SOLUTION INTRAMUSCULAR; INTRAVENOUS at 03:11

## 2022-11-26 RX ADMIN — SODIUM CHLORIDE, SODIUM LACTATE, POTASSIUM CHLORIDE, AND CALCIUM CHLORIDE 1803 ML: .6; .31; .03; .02 INJECTION, SOLUTION INTRAVENOUS at 11:11

## 2022-11-26 NOTE — Clinical Note
Diagnosis: AMS (altered mental status) [7487333]   Future Attending Provider: DIVINA KAT [[id number]]   Is the patient being sent to ED Observation?: No   Admitting Provider:: DIVINA KAT [[id number]]   Special Needs:: Fall Risk [15]

## 2022-11-26 NOTE — ED PROVIDER NOTES
"SCRIBE #1 NOTE: I, Iftikhar James, am scribing for, and in the presence of,  Parul Sainz MD.       Source of History:  The patient, the patient's son, EMS.     Chief complaint:  Altered Mental Status (Pt son activated EMS for pt being more lethargic ; hx of stroke pt aaox3)      HPI:  Gregorio Bishop is a 82 y.o. male presenting via EMS with altered mental status per the patient's son. Per EMS, the patient's son states that he seemed less active than usual this morning, describing him as "lethargic". The patient denies any chest pain, abdominal pain, or headache. He reports minor shortness of breath and notes that he has had a decreased appetite over the past few months. Of note, the patient has a PMHx of HTN and CVA.     This is the extent to the patients complaints today here in the emergency department.    ROS: As per HPI and below:  General: No fever.  No chills. +Decreased appetite.   Eyes: No visual changes.  ENT: No sore throat. No ear pain  Head: No headache.    Respiratory: +Shortness of breath.  Cardiovascular: No chest pain.  Abdomen: No abdominal pain.  No nausea or vomiting.  Genito-Urinary: No abnormal urination.  Neurologic: No focal weakness.  No numbness.  MSK: No back pain.  Integument: No rashes or lesions.  Hematologic: No easy bruising.  Endocrine: No excessive thirst or urination.      Review of patient's allergies indicates:  No Known Allergies    PMH:  As per HPI and below:  Past Medical History:   Diagnosis Date    Hypertension     Stroke      History reviewed. No pertinent surgical history.    Social History     Tobacco Use    Smoking status: Never    Smokeless tobacco: Never   Substance Use Topics    Alcohol use: Not Currently     Comment: no alcohol for 6 months    Drug use: Never       Physical Exam:    /78 (BP Location: Left arm, Patient Position: Lying)   Pulse 89   Temp 97.9 °F (36.6 °C) (Oral)   Resp 18   Ht 5' 4" (1.626 m)   Wt 59.1 kg (130 lb 4.7 oz)   SpO2 100%  "  BMI 22.36 kg/m²   Nursing note and vital signs reviewed.    Appearance: No acute distress. Thin, frail appearing.   Eyes: No conjunctival injection.  Neck: No deformity.   ENT: Oropharynx clear.  No stridor. Dry oral mucosa.   Chest/ Respiratory: Clear to auscultation bilaterally.  Good air movement.  No wheezes.  No rhonchi. No rales. No accessory muscle use.  Cardiovascular: Regular rate and rhythm.  No murmurs. No gallops. No rubs.  Abdomen: Soft.  Not distended.  Nontender.  No guarding.  No rebound. Non-peritoneal.  Musculoskeletal: Swelling to right ankle region. Good range of motion all joints.  No deformities.  Neck supple.  No meningismus.  Skin: No rashes seen.  Good turgor.  No abrasions.  No ecchymoses.  Neurologic: Weakness of right lower extremity. Equal hand grasp. Cranial nerves 3-12 grossly intact.  Sensation intact. Cerebellar intact.  Mental Status:  Alert and oriented x 3.  Appropriate, conversant.      EKG:  I independently reviewed and interpreted the EKG and my findings are as follows:   Normal sinus rhythm, rate of 77. ST flattening similar to previous from 4/22.     Imaging:  I independently reviewed and interpreted the patient's X-Ray Chest AP Portable and my findings are as follows:  Cardiomegaly, interstitial edema, haziness in left lung base.     Imaging Results               CT Head Without Contrast (Final result)  Result time 11/26/22 12:50:00      Final result by Toney Belle MD (11/26/22 12:50:00)                   Impression:      1. Right frontal lobe and high posterior left frontal and left parietal lobe encephalomalacia likely sequela of remote infarction, but new from 09/07/2021 studies.  Superimposed acute ischemia not excluded.  If persistent neurologic deficit, MRI brain can be obtained.  2. Grossly stable additional chronic findings as above.  3. Previous bilateral subdural collections are not seen on today's study.  This report was flagged in Epic as  abnormal.      Electronically signed by: Toney Belle MD  Date:    11/26/2022  Time:    12:50               Narrative:    EXAMINATION:  CT HEAD WITHOUT CONTRAST    CLINICAL HISTORY:  Mental status change, unknown cause;    TECHNIQUE:  Low dose axial CT images obtained throughout the head without intravenous contrast. Sagittal and coronal reconstructions were performed.    COMPARISON:  CTA head and neck 09/07/2021 and MRI brain 09/07/2021    FINDINGS:  Patient is rotated in the scanner.  Patient motion with beam hardening and streak artifact limited evaluation on initial images but mostly resolved with repeat scanning.    Intracranial compartment: Generalized cerebral volume loss.    The ventricles are midline and stable in size and configuration without distortion by mass effect or acute hydrocephalus, noting continued ventriculomegaly out of proportion to sulcal which may reflect sequela of chronic normal pressure hydrocephalus versus central predominant atrophy.  No extra-axial blood or fluid collections definitively seen.    New small area of encephalomalacia involving the posterolateral high right frontal lobe and small to moderate-sized area of encephalomalacia involving the parasagittal left parietal lobe and posterior left frontal lobe near the vertex, suggesting sequela of remote infarct, but both new from 09/07/2021 study.  Superimposed acute ischemia in these regions not excluded.  Grossly stable patchy and confluent hypoattenuation of the subcortical and periventricular white matter consistent with chronic microvascular ischemic change.  Right pontine and bilateral thalami suspected remote lacunar type infarcts, better demonstrated on previous MRI study.  No parenchymal mass, hemorrhage, edema or definite acute major vascular distribution infarct.  Skull base atherosclerotic vascular calcifications noted.    Skull/extracranial contents (limited evaluation): No fracture. Mastoid air cells and paranasal  sinuses are essentially clear.                                       X-Ray Chest AP Portable (Final result)  Result time 11/26/22 13:13:58      Final result by Toney Belle MD (11/26/22 13:13:58)                   Impression:      Findings suggesting worsening pulmonary edema/CHF pattern and small bilateral pleural effusions.  Interstitial type pneumonia not excluded.  No large consolidative process.      Electronically signed by: Toney Belle MD  Date:    11/26/2022  Time:    13:13               Narrative:    EXAMINATION:  XR CHEST AP PORTABLE    CLINICAL HISTORY:  Sepsis;    TECHNIQUE:  Single frontal view of the chest was performed.    COMPARISON:  Chest radiograph 04/01/2022    FINDINGS:  Monitoring leads overlie the chest.  Patient is rotated.    Cardiomediastinal silhouette is midline and prominent grossly similar to prior.  There is increased prominence of the central pulmonary vasculature with bilateral diffuse nonspecific interstitial coarsening with central peribronchial cuffing in a mid to lower lung zone distribution.  There is chronic elevation of left hemidiaphragm with new blunting of the left costophrenic angle and hazy opacification of the left lung base.  There is also new slight blunting of the right costophrenic angle.  No large consolidation or pneumothorax definitively seen.  Hilar contours are stable.  Acute osseous process seen.  PA and lateral views can be obtained.                                        Labs:  Results for orders placed or performed during the hospital encounter of 11/26/22   Blood culture x two cultures. Draw prior to antibiotics.    Specimen: Peripheral, Antecubital, Left; Blood   Result Value Ref Range    Blood Culture, Routine No growth after 5 days.    Blood culture x two cultures. Draw prior to antibiotics.    Specimen: Peripheral, Antecubital, Right; Blood   Result Value Ref Range    Blood Culture, Routine No growth after 5 days.    CBC auto differential   Result  Value Ref Range    WBC 9.96 3.90 - 12.70 K/uL    RBC 4.81 4.60 - 6.20 M/uL    Hemoglobin 14.9 14.0 - 18.0 g/dL    Hematocrit 44.7 40.0 - 54.0 %    MCV 93 82 - 98 fL    MCH 31.0 27.0 - 31.0 pg    MCHC 33.3 32.0 - 36.0 g/dL    RDW 12.3 11.5 - 14.5 %    Platelets 195 150 - 450 K/uL    MPV 11.0 9.2 - 12.9 fL    Immature Granulocytes 0.5 0.0 - 0.5 %    Gran # (ANC) 7.7 1.8 - 7.7 K/uL    Immature Grans (Abs) 0.05 (H) 0.00 - 0.04 K/uL    Lymph # 1.3 1.0 - 4.8 K/uL    Mono # 0.8 0.3 - 1.0 K/uL    Eos # 0.0 0.0 - 0.5 K/uL    Baso # 0.04 0.00 - 0.20 K/uL    nRBC 0 0 /100 WBC    Gran % 77.7 (H) 38.0 - 73.0 %    Lymph % 13.0 (L) 18.0 - 48.0 %    Mono % 8.1 4.0 - 15.0 %    Eosinophil % 0.3 0.0 - 8.0 %    Basophil % 0.4 0.0 - 1.9 %    Differential Method Automated    Comprehensive metabolic panel   Result Value Ref Range    Sodium 143 136 - 145 mmol/L    Potassium 3.7 3.5 - 5.1 mmol/L    Chloride 109 95 - 110 mmol/L    CO2 25 23 - 29 mmol/L    Glucose 132 (H) 70 - 110 mg/dL    BUN 23 8 - 23 mg/dL    Creatinine 1.1 0.5 - 1.4 mg/dL    Calcium 9.1 8.7 - 10.5 mg/dL    Total Protein 7.1 6.0 - 8.4 g/dL    Albumin 3.6 3.5 - 5.2 g/dL    Total Bilirubin 1.5 (H) 0.1 - 1.0 mg/dL    Alkaline Phosphatase 71 55 - 135 U/L    AST 32 10 - 40 U/L    ALT 35 10 - 44 U/L    Anion Gap 9 8 - 16 mmol/L    eGFR >60 >60 mL/min/1.73 m^2   Lactic Acid, Plasma #1   Result Value Ref Range    Lactate (Lactic Acid) 1.4 0.5 - 2.2 mmol/L   Lactic acid, plasma #2   Result Value Ref Range    Lactate (Lactic Acid) 2.1 0.5 - 2.2 mmol/L   Urinalysis, Reflex to Urine Culture Urine, Clean Catch    Specimen: Urine   Result Value Ref Range    Specimen UA Urine, Clean Catch     Color, UA Yellow Yellow, Straw, Kristine    Appearance, UA Clear Clear    pH, UA 6.0 5.0 - 8.0    Specific Gravity, UA 1.015 1.005 - 1.030    Protein, UA 1+ (A) Negative    Glucose, UA Negative Negative    Ketones, UA Negative Negative    Bilirubin (UA) Negative Negative    Occult Blood UA Trace (A)  Negative    Nitrite, UA Negative Negative    Urobilinogen, UA 1.0 <2.0 EU/dL    Leukocytes, UA Negative Negative   Troponin I   Result Value Ref Range    Troponin I 0.078 (H) 0.000 - 0.026 ng/mL   Magnesium   Result Value Ref Range    Magnesium 2.1 1.6 - 2.6 mg/dL   Urinalysis Microscopic   Result Value Ref Range    RBC, UA 4 0 - 4 /hpf    WBC, UA 1 0 - 5 /hpf    Bacteria Occasional None-Occ /hpf    Hyaline Casts, UA 5 (A) 0-1/lpf /lpf    Microscopic Comment SEE COMMENT    BNP   Result Value Ref Range    BNP 2,854 (H) 0 - 99 pg/mL   C-Reactive Protein   Result Value Ref Range    CRP 10.3 (H) 0.0 - 8.2 mg/L   Procalcitonin   Result Value Ref Range    Procalcitonin 0.09 <0.25 ng/mL   Troponin I   Result Value Ref Range    Troponin I 0.065 (H) 0.000 - 0.026 ng/mL   Lactic acid, plasma   Result Value Ref Range    Lactate (Lactic Acid) 2.2 0.5 - 2.2 mmol/L   Troponin I   Result Value Ref Range    Troponin I 0.087 (H) 0.000 - 0.026 ng/mL   Basic metabolic panel   Result Value Ref Range    Sodium 143 136 - 145 mmol/L    Potassium 3.5 3.5 - 5.1 mmol/L    Chloride 110 95 - 110 mmol/L    CO2 24 23 - 29 mmol/L    Glucose 181 (H) 70 - 110 mg/dL    BUN 22 8 - 23 mg/dL    Creatinine 1.2 0.5 - 1.4 mg/dL    Calcium 8.9 8.7 - 10.5 mg/dL    Anion Gap 9 8 - 16 mmol/L    eGFR 60 >60 mL/min/1.73 m^2   Hemoglobin A1c   Result Value Ref Range    Hemoglobin A1C 5.0 4.0 - 5.6 %    Estimated Avg Glucose 97 68 - 131 mg/dL   Lipid panel   Result Value Ref Range    Cholesterol 100 (L) 120 - 199 mg/dL    Triglycerides 67 30 - 150 mg/dL    HDL 38 (L) 40 - 75 mg/dL    LDL Cholesterol 48.6 (L) 63.0 - 159.0 mg/dL    HDL/Cholesterol Ratio 38.0 20.0 - 50.0 %    Total Cholesterol/HDL Ratio 2.6 2.0 - 5.0    Non-HDL Cholesterol 62 mg/dL   TSH   Result Value Ref Range    TSH 1.324 0.400 - 4.000 uIU/mL   Vitamin B1   Result Value Ref Range    Thiamine 85 38 - 122 ug/L   Vitamin B6   Result Value Ref Range    Vitamin B6 14 5 - 50 ug/L   Vitamin B12   Result  Value Ref Range    Vitamin B-12 321 210 - 950 pg/mL   RPR   Result Value Ref Range    RPR Non-reactive Non-reactive   Ammonia   Result Value Ref Range    Ammonia 38 10 - 50 umol/L   Lactic acid, plasma   Result Value Ref Range    Lactate (Lactic Acid) 2.7 (H) 0.5 - 2.2 mmol/L   Basic metabolic panel   Result Value Ref Range    Sodium 143 136 - 145 mmol/L    Potassium 4.0 3.5 - 5.1 mmol/L    Chloride 105 95 - 110 mmol/L    CO2 28 23 - 29 mmol/L    Glucose 115 (H) 70 - 110 mg/dL    BUN 25 (H) 8 - 23 mg/dL    Creatinine 1.1 0.5 - 1.4 mg/dL    Calcium 8.6 (L) 8.7 - 10.5 mg/dL    Anion Gap 10 8 - 16 mmol/L    eGFR >60 >60 mL/min/1.73 m^2   Magnesium   Result Value Ref Range    Magnesium 1.8 1.6 - 2.6 mg/dL   POCT Influenza A/B Molecular   Result Value Ref Range    POC Molecular Influenza A Ag Negative Negative, Not Reported    POC Molecular Influenza B Ag Negative Negative, Not Reported     Acceptable Yes    POCT COVID-19 Rapid Screening   Result Value Ref Range    POC Rapid COVID Negative Negative     Acceptable Yes    Echo Saline Bubble? Yes   Result Value Ref Range    BSA 1.63 m2    LV LATERAL E/E' RATIO 25.40 m/s    IVC diameter 1.84 cm    Left Ventricular Outflow Tract Mean Velocity 0.51 cm/s    Left Ventricular Outflow Tract Mean Gradient 1.16 mmHg    TDI LATERAL 0.05 m/s    PV PEAK VELOCITY 0.70 cm/s    LVIDd 4.81 3.5 - 6.0 cm    IVS 1.15 (A) 0.6 - 1.1 cm    Posterior Wall 1.19 (A) 0.6 - 1.1 cm    LVIDs 4.50 (A) 2.1 - 4.0 cm    FS 6 28 - 44 %    STJ 2.58 cm    Ascending aorta 3.19 cm    LV mass 212.13 g    LA size 4.34 cm    RVDD 4.02 cm    Left Ventricle Relative Wall Thickness 0.49 cm    AV mean gradient 2 mmHg    AV valve area 2.32 cm2    AV Velocity Ratio 0.73     AV index (prosthetic) 0.67     MV valve area p 1/2 method 4.35 cm2    E/A ratio 2.89     E wave deceleration time 174.57 msec    IVRT 111.32 msec    LVOT diameter 2.10 cm    LVOT area 3.5 cm2    LVOT peak marilee 0.77 m/s     LVOT peak VTI 11.20 cm    Ao peak vasyl 1.06 m/s    Ao VTI 16.7 cm    Mr max vasyl 4.86 m/s    LVOT stroke volume 38.77 cm3    AV peak gradient 4 mmHg    MV Peak E Vasyl 1.27 m/s    TR Max Vasyl 3.38 m/s    MV stenosis pressure 1/2 time 50.63 ms    MV Peak A Vasyl 0.44 m/s    LV Systolic Volume 92.35 mL    LV Systolic Volume Index 56.7 mL/m2    LV Diastolic Volume 108.24 mL    LV Diastolic Volume Index 66.40 mL/m2    LV Mass Index 130 g/m2    RA Major Axis 5.23 cm    Left Atrium Minor Axis 7.23 cm    Left Atrium Major Axis 7.08 cm    Triscuspid Valve Regurgitation Peak Gradient 46 mmHg    LA Volume Index (Mod) 52.7 mL/m2    LA volume (mod) 85.94 cm3    LA WIDTH 5.20 cm    LA volume 137.24 cm3    Sinus 3.30 cm    TAPSE 1.40 cm    RV S' 10 cm/s    LA Volume Index 84.2 mL/m2    RA Width 4.50 cm    Right Atrial Pressure (from IVC) 8 mmHg    TV rest pulmonary artery pressure 54 mmHg    EF 15 %   ISTAT Lactate   Result Value Ref Range    POC Lactate 1.37 0.5 - 2.2 mmol/L    Sample VENOUS     FiO2 21        Initial Impression  82 y.o. male with failure to thrive and lethargy according to family. The patient's wife is also with significant medical issues as well, family is experiencing caregiver burden. Patient has no specific complaints, plan workup for causes of lethargy including metabolic, infectious, intracranial, or cardiac.     Differential Dx:  Differential diagnosis includes, but is not limited to: electrolyte abnormality, hypoglycemia, CVA, spinal cord abnormality, infectious causes, Guillain [place], neuromuscular junction disease, muscle disease, endocrine abnormalities, sepsis.    MDM:    Initially patient  thought to potentially have an infection, with dry skin and poor po intake.  However, patient with mildly elevated troponin very elevated BNP, some subtle new CT brain findings.  Overall workup more suggestive of acute on chronic systolic and diastolic heart failure.  Normal white blood cell count, normal lactic acid.   Chest x-ray shows bilateral pleural effusions with pulmonary edema.  Patient received a partial bolus prior to these results and this was stopped and patient given 20mg lasix following CXR/BNP result.  I discussed the patient with Dr. Linares with neurology due to abnormal CT, but patient's complaints are nonspecific and he recommended monitoring and MRI tomorrow if symptoms persist.       I discussed the patient's presentation, findings and response to treatment in the ED with the hospitalist on call who will admit for further treatment and evaluation.                      Scribe Attestation:   Scribe #1: I performed the above scribed service and the documentation accurately describes the services I performed. I attest to the accuracy of the note.    Physician Attestation for Scribe: I, Parul Sainz MD, reviewed documentation as scribed in my presence, which is both accurate and complete.      Diagnostic Impression:    1. Acute on chronic combined systolic and diastolic heart failure    2. Weakness    3. AMS (altered mental status)    4. CHF (congestive heart failure)    5. Cerebrovascular accident (CVA), unspecified mechanism    6. Abnormal head CT         ED Disposition Condition    Observation Stable                  Parul Sainz MD  12/06/22 0905

## 2022-11-26 NOTE — ED TRIAGE NOTES
Pt presents to ED via EMS. Son activated EMS for patient being increasingly lethargic. Per patient, family had trouble waking him up this am. Pt denies any CP, SOB, or fever at home. Hx of stroke in the past.

## 2022-11-27 PROBLEM — I50.42 CHRONIC COMBINED SYSTOLIC AND DIASTOLIC HEART FAILURE: Status: ACTIVE | Noted: 2022-11-26

## 2022-11-27 LAB
AMMONIA PLAS-SCNC: 38 UMOL/L (ref 10–50)
ANION GAP SERPL CALC-SCNC: 9 MMOL/L (ref 8–16)
BUN SERPL-MCNC: 22 MG/DL (ref 8–23)
CALCIUM SERPL-MCNC: 8.9 MG/DL (ref 8.7–10.5)
CHLORIDE SERPL-SCNC: 110 MMOL/L (ref 95–110)
CHOLEST SERPL-MCNC: 100 MG/DL (ref 120–199)
CHOLEST/HDLC SERPL: 2.6 {RATIO} (ref 2–5)
CO2 SERPL-SCNC: 24 MMOL/L (ref 23–29)
CREAT SERPL-MCNC: 1.2 MG/DL (ref 0.5–1.4)
EST. GFR  (NO RACE VARIABLE): 60 ML/MIN/1.73 M^2
ESTIMATED AVG GLUCOSE: 97 MG/DL (ref 68–131)
GLUCOSE SERPL-MCNC: 181 MG/DL (ref 70–110)
HBA1C MFR BLD: 5 % (ref 4–5.6)
HDLC SERPL-MCNC: 38 MG/DL (ref 40–75)
HDLC SERPL: 38 % (ref 20–50)
LACTATE SERPL-SCNC: 2.7 MMOL/L (ref 0.5–2.2)
LDLC SERPL CALC-MCNC: 48.6 MG/DL (ref 63–159)
NONHDLC SERPL-MCNC: 62 MG/DL
POTASSIUM SERPL-SCNC: 3.5 MMOL/L (ref 3.5–5.1)
SODIUM SERPL-SCNC: 143 MMOL/L (ref 136–145)
TRIGL SERPL-MCNC: 67 MG/DL (ref 30–150)
TROPONIN I SERPL DL<=0.01 NG/ML-MCNC: 0.09 NG/ML (ref 0–0.03)
TSH SERPL DL<=0.005 MIU/L-ACNC: 1.32 UIU/ML (ref 0.4–4)
VIT B12 SERPL-MCNC: 321 PG/ML (ref 210–950)

## 2022-11-27 PROCEDURE — 86592 SYPHILIS TEST NON-TREP QUAL: CPT | Performed by: NURSE PRACTITIONER

## 2022-11-27 PROCEDURE — 82140 ASSAY OF AMMONIA: CPT | Performed by: NURSE PRACTITIONER

## 2022-11-27 PROCEDURE — 84443 ASSAY THYROID STIM HORMONE: CPT | Performed by: NURSE PRACTITIONER

## 2022-11-27 PROCEDURE — 25000003 PHARM REV CODE 250: Performed by: NURSE PRACTITIONER

## 2022-11-27 PROCEDURE — 83036 HEMOGLOBIN GLYCOSYLATED A1C: CPT | Performed by: NURSE PRACTITIONER

## 2022-11-27 PROCEDURE — 97166 OT EVAL MOD COMPLEX 45 MIN: CPT

## 2022-11-27 PROCEDURE — 82607 VITAMIN B-12: CPT | Performed by: NURSE PRACTITIONER

## 2022-11-27 PROCEDURE — 84484 ASSAY OF TROPONIN QUANT: CPT | Performed by: STUDENT IN AN ORGANIZED HEALTH CARE EDUCATION/TRAINING PROGRAM

## 2022-11-27 PROCEDURE — 97535 SELF CARE MNGMENT TRAINING: CPT

## 2022-11-27 PROCEDURE — 97161 PT EVAL LOW COMPLEX 20 MIN: CPT

## 2022-11-27 PROCEDURE — 63600175 PHARM REV CODE 636 W HCPCS: Performed by: STUDENT IN AN ORGANIZED HEALTH CARE EDUCATION/TRAINING PROGRAM

## 2022-11-27 PROCEDURE — 80061 LIPID PANEL: CPT | Performed by: NURSE PRACTITIONER

## 2022-11-27 PROCEDURE — G0378 HOSPITAL OBSERVATION PER HR: HCPCS

## 2022-11-27 PROCEDURE — 99225 PR SUBSEQUENT OBSERVATION CARE,LEVEL II: CPT | Mod: ,,, | Performed by: STUDENT IN AN ORGANIZED HEALTH CARE EDUCATION/TRAINING PROGRAM

## 2022-11-27 PROCEDURE — 80048 BASIC METABOLIC PNL TOTAL CA: CPT | Performed by: NURSE PRACTITIONER

## 2022-11-27 PROCEDURE — 63600175 PHARM REV CODE 636 W HCPCS: Performed by: NURSE PRACTITIONER

## 2022-11-27 PROCEDURE — 97530 THERAPEUTIC ACTIVITIES: CPT

## 2022-11-27 PROCEDURE — 84425 ASSAY OF VITAMIN B-1: CPT | Performed by: NURSE PRACTITIONER

## 2022-11-27 PROCEDURE — 96372 THER/PROPH/DIAG INJ SC/IM: CPT | Performed by: NURSE PRACTITIONER

## 2022-11-27 PROCEDURE — 99225 PR SUBSEQUENT OBSERVATION CARE,LEVEL II: ICD-10-PCS | Mod: ,,, | Performed by: STUDENT IN AN ORGANIZED HEALTH CARE EDUCATION/TRAINING PROGRAM

## 2022-11-27 PROCEDURE — 84207 ASSAY OF VITAMIN B-6: CPT | Performed by: NURSE PRACTITIONER

## 2022-11-27 PROCEDURE — 83605 ASSAY OF LACTIC ACID: CPT | Performed by: NURSE PRACTITIONER

## 2022-11-27 PROCEDURE — 96376 TX/PRO/DX INJ SAME DRUG ADON: CPT

## 2022-11-27 RX ORDER — FUROSEMIDE 10 MG/ML
40 INJECTION INTRAMUSCULAR; INTRAVENOUS 2 TIMES DAILY
Status: DISCONTINUED | OUTPATIENT
Start: 2022-11-27 | End: 2022-11-27

## 2022-11-27 RX ORDER — TALC
3 POWDER (GRAM) TOPICAL NIGHTLY PRN
Status: DISCONTINUED | OUTPATIENT
Start: 2022-11-28 | End: 2022-11-28 | Stop reason: HOSPADM

## 2022-11-27 RX ORDER — FUROSEMIDE 10 MG/ML
40 INJECTION INTRAMUSCULAR; INTRAVENOUS DAILY
Status: DISCONTINUED | OUTPATIENT
Start: 2022-11-28 | End: 2022-11-28 | Stop reason: HOSPADM

## 2022-11-27 RX ADMIN — HEPARIN SODIUM 5000 UNITS: 5000 INJECTION INTRAVENOUS; SUBCUTANEOUS at 02:11

## 2022-11-27 RX ADMIN — ATORVASTATIN CALCIUM 80 MG: 20 TABLET, FILM COATED ORAL at 09:11

## 2022-11-27 RX ADMIN — HEPARIN SODIUM 5000 UNITS: 5000 INJECTION INTRAVENOUS; SUBCUTANEOUS at 05:11

## 2022-11-27 RX ADMIN — FUROSEMIDE 40 MG: 10 INJECTION, SOLUTION INTRAMUSCULAR; INTRAVENOUS at 09:11

## 2022-11-27 RX ADMIN — ASPIRIN 81 MG: 81 TABLET, COATED ORAL at 09:11

## 2022-11-27 RX ADMIN — LOSARTAN POTASSIUM 25 MG: 25 TABLET, FILM COATED ORAL at 09:11

## 2022-11-27 RX ADMIN — HEPARIN SODIUM 5000 UNITS: 5000 INJECTION INTRAVENOUS; SUBCUTANEOUS at 09:11

## 2022-11-27 NOTE — H&P
[facility] Medicine  History & Physical    Patient Name: Gregorio Bishop  MRN: [id number]  Patient Class: OP- Observation  Admission Date: 11/26/2022  Attending Physician: Kristofer Lara MD   Primary Care Provider: Renaldo Molina MD         Patient information was obtained from patient and ER records.     Subjective:     Principal Problem:Acute on chronic combined systolic and diastolic heart failure    Chief Complaint:   Chief Complaint   Patient presents with    Altered Mental Status     Pt son activated EMS for pt being more lethargic ; hx of stroke pt aaox3        HPI: Mr Perkins is an 82-year-old male with a past medical history of hypertension, heart failure, and CVA.  He came to emergency department today via EMS after his family found him to be more lethargic, weak and with some confusion today.  No family at bedside.  Per chart, he had a stroke in 2021 and has right-sided weakness.  On exam patient is oriented to self, place, and month.  He is able to respond appropriately and follow commands.  Patient reports some increased weakness and decreased sensation on the right side along with some slowing of his speech.  The right-sided weakness and decreased sensation has been ongoing for multiple months and speech changes began 3 days ago.  No dysarthria or aphasia on exam.  Patient did become tearful because he is unable to get up and walk.  In the ED patient was afebrile with a white blood cell count of 9.9.  Lactic acid 1.4 with a repeat of 2.1.  Chest x-ray reports small bilateral pleural effusions suggestive of heart failure with a BNP of 2854.  Patient had a troponin of 0.078 with a repeat pending.  Patient had a CT of his head which showed remote infarcts.  ED physician spoke with Dr. Linares with the neurology team who recommended an MRI there is no improvement tomorrow.  Patient received lactated Ringer's under septic protocol.  Lasix 20 mg IV was given also.  Patient  admitted to hospital medicine for further management.      Past Medical History:   Diagnosis Date    Hypertension     Stroke        History reviewed. No pertinent surgical history.    Review of patient's allergies indicates:  No Known Allergies    No current facility-administered medications on file prior to encounter.     Current Outpatient Medications on File Prior to Encounter   Medication Sig    atorvastatin (LIPITOR) 80 MG tablet Take 1 tablet (80 mg total) by mouth every evening.    losartan (COZAAR) 25 MG tablet Take 1 tablet (25 mg total) by mouth once daily.    acetaminophen (TYLENOL) 325 MG tablet Take 650 mg by mouth every 8 (eight) hours as needed.    cyanocobalamin (VITAMIN B-12) 1000 MCG tablet Take 1 tablet (1,000 mcg total) by mouth once daily.     Family History       Problem Relation (Age of Onset)    Alcohol abuse Father    Hypertension Mother, Paternal Aunt, Paternal Grandmother, Paternal Grandfather    No Known Problems Sister, Daughter, Son          Tobacco Use    Smoking status: Never    Smokeless tobacco: Never   Substance and Sexual Activity    Alcohol use: Not Currently     Comment: no alcohol for 6 months    Drug use: Never    Sexual activity: Not Currently     Review of Systems   Constitutional:  Positive for activity change. Negative for appetite change, chills and fever.   HENT: Negative.  Negative for congestion, mouth sores and trouble swallowing.    Eyes:  Negative for photophobia and pain.        Ongoing decreased vision   Respiratory:  Positive for shortness of breath (with activity). Negative for cough, choking and chest tightness.    Cardiovascular: Negative.  Negative for chest pain, palpitations and leg swelling.   Gastrointestinal: Negative.  Negative for abdominal distention, abdominal pain, blood in stool, constipation, diarrhea, nausea and vomiting.   Endocrine: Negative.  Negative for polydipsia, polyphagia and polyuria.   Genitourinary: Negative.  Negative for  decreased urine volume, difficulty urinating, dysuria and enuresis.   Musculoskeletal:  Positive for gait problem (difficulty with ambulation). Negative for arthralgias and back pain.   Skin: Negative.  Negative for pallor and rash.   Allergic/Immunologic: Negative.  Negative for environmental allergies and food allergies.   Neurological:  Positive for weakness. Negative for dizziness, facial asymmetry, speech difficulty and headaches.   Hematological: Negative.    Psychiatric/Behavioral: Negative.  Negative for agitation (Right weaker than left), behavioral problems and confusion.    Objective:     Vital Signs (Most Recent):  Temp: 97.5 °F (36.4 °C) (11/26/22 1910)  Pulse: 88 (11/26/22 1954)  Resp: 18 (11/26/22 1910)  BP: (!) 159/100 (11/26/22 1954)  SpO2: (!) 94 % (11/26/22 1910)   Vital Signs (24h Range):  Temp:  [97.5 °F (36.4 °C)] 97.5 °F (36.4 °C)  Pulse:  [78-90] 88  Resp:  [14-18] 18  SpO2:  [94 %-99 %] 94 %  BP: (129-160)/() 159/100     Weight: 60.1 kg (132 lb 7.9 oz)  Body mass index is 22.74 kg/m².    Physical Exam  Vitals reviewed.   Constitutional:       General: He is not in acute distress.     Appearance: Normal appearance.   HENT:      Head: Normocephalic and atraumatic.      Mouth/Throat:      Mouth: Mucous membranes are moist.      Pharynx: Oropharynx is clear. No posterior oropharyngeal erythema.   Eyes:      Extraocular Movements: Extraocular movements intact.      Pupils: Pupils are equal, round, and reactive to light.   Cardiovascular:      Rate and Rhythm: Normal rate and regular rhythm.      Pulses: Normal pulses.      Heart sounds: Normal heart sounds. No murmur heard.  Pulmonary:      Effort: Pulmonary effort is normal. No respiratory distress.      Breath sounds: Normal breath sounds. No wheezing.   Abdominal:      General: Bowel sounds are normal. There is no distension.      Palpations: Abdomen is soft.      Tenderness: There is no abdominal tenderness. There is no guarding.    Musculoskeletal:         General: No swelling, tenderness or signs of injury. Normal range of motion.      Cervical back: Normal range of motion and neck supple. No rigidity or tenderness.   Skin:     General: Skin is warm and dry.   Neurological:      Mental Status: He is alert and oriented to person, place, and time.      Cranial Nerves: Cranial nerve deficit (decreased visioni right eye (not new)) present.      Sensory: No sensory deficit.      Motor: No weakness.      Comments: Decreased sensation to right lower extremity and right cheek (not new)  Right lower extremity MS 3+/5, Left lower extremity MS 4+/5 (not new)   Psychiatric:         Mood and Affect: Mood normal.         Behavior: Behavior normal.         CRANIAL NERVES     CN III, IV, VI   Pupils are equal, round, and reactive to light.     Significant Labs: All pertinent labs within the past 24 hours have been reviewed.  A1C:   Recent Labs   Lab 11/22/22  0825   HGBA1C 4.8     BMP:   Recent Labs   Lab 11/26/22  1138   *      K 3.7      CO2 25   BUN 23   CREATININE 1.1   CALCIUM 9.1   MG 2.1     CBC:   Recent Labs   Lab 11/26/22  1138   WBC 9.96   HGB 14.9   HCT 44.7        CMP:   Recent Labs   Lab 11/26/22  1138      K 3.7      CO2 25   *   BUN 23   CREATININE 1.1   CALCIUM 9.1   PROT 7.1   ALBUMIN 3.6   BILITOT 1.5*   ALKPHOS 71   AST 32   ALT 35   ANIONGAP 9     Troponin:   Recent Labs   Lab 11/26/22  1138   TROPONINI 0.078*     TSH:   Recent Labs   Lab 11/22/22  0825   TSH 1.741     Urine Culture: No results for input(s): LABURIN in the last 48 hours.  Urine Studies:   Recent Labs   Lab 11/26/22  1258   COLORU Yellow   APPEARANCEUA Clear   PHUR 6.0   SPECGRAV 1.015   PROTEINUA 1+*   GLUCUA Negative   KETONESU Negative   BILIRUBINUA Negative   OCCULTUA Trace*   NITRITE Negative   UROBILINOGEN 1.0   LEUKOCYTESUR Negative   RBCUA 4   WBCUA 1   BACTERIA Occasional   HYALINECASTS 5*       Significant Imaging:  I have reviewed all pertinent imaging results/findings within the past 24 hours.  X-Ray Chest AP Portable  Narrative: EXAMINATION:  XR CHEST AP PORTABLE    CLINICAL HISTORY:  Sepsis;    TECHNIQUE:  Single frontal view of the chest was performed.    COMPARISON:  Chest radiograph 04/01/2022    FINDINGS:  Monitoring leads overlie the chest.  Patient is rotated.    Cardiomediastinal silhouette is midline and prominent grossly similar to prior.  There is increased prominence of the central pulmonary vasculature with bilateral diffuse nonspecific interstitial coarsening with central peribronchial cuffing in a mid to lower lung zone distribution.  There is chronic elevation of left hemidiaphragm with new blunting of the left costophrenic angle and hazy opacification of the left lung base.  There is also new slight blunting of the right costophrenic angle.  No large consolidation or pneumothorax definitively seen.  Hilar contours are stable.  Acute osseous process seen.  PA and lateral views can be obtained.  Impression: Findings suggesting worsening pulmonary edema/CHF pattern and small bilateral pleural effusions.  Interstitial type pneumonia not excluded.  No large consolidative process.    Electronically signed by: Toney Belle MD  Date:    11/26/2022  Time:    13:13  CT Head Without Contrast  Narrative: EXAMINATION:  CT HEAD WITHOUT CONTRAST    CLINICAL HISTORY:  Mental status change, unknown cause;    TECHNIQUE:  Low dose axial CT images obtained throughout the head without intravenous contrast. Sagittal and coronal reconstructions were performed.    COMPARISON:  CTA head and neck 09/07/2021 and MRI brain 09/07/2021    FINDINGS:  Patient is rotated in the scanner.  Patient motion with beam hardening and streak artifact limited evaluation on initial images but mostly resolved with repeat scanning.    Intracranial compartment: Generalized cerebral volume loss.    The ventricles are midline and stable in size and  configuration without distortion by mass effect or acute hydrocephalus, noting continued ventriculomegaly out of proportion to sulcal which may reflect sequela of chronic normal pressure hydrocephalus versus central predominant atrophy.  No extra-axial blood or fluid collections definitively seen.    New small area of encephalomalacia involving the posterolateral high right frontal lobe and small to moderate-sized area of encephalomalacia involving the parasagittal left parietal lobe and posterior left frontal lobe near the vertex, suggesting sequela of remote infarct, but both new from 09/07/2021 study.  Superimposed acute ischemia in these regions not excluded.  Grossly stable patchy and confluent hypoattenuation of the subcortical and periventricular white matter consistent with chronic microvascular ischemic change.  Right pontine and bilateral thalami suspected remote lacunar type infarcts, better demonstrated on previous MRI study.  No parenchymal mass, hemorrhage, edema or definite acute major vascular distribution infarct.  Skull base atherosclerotic vascular calcifications noted.    Skull/extracranial contents (limited evaluation): No fracture. Mastoid air cells and paranasal sinuses are essentially clear.  Impression: 1. Right frontal lobe and high posterior left frontal and left parietal lobe encephalomalacia likely sequela of remote infarction, but new from 09/07/2021 studies.  Superimposed acute ischemia not excluded.  If persistent neurologic deficit, MRI brain can be obtained.  2. Grossly stable additional chronic findings as above.  3. Previous bilateral subdural collections are not seen on today's study.  This report was flagged in Epic as abnormal.    Electronically signed by: Toney Belle MD  Date:    11/26/2022  Time:    12:50      Assessment/Plan:     * Acute on chronic combined systolic and diastolic heart failure  -BNP 2,854  -CXR:  Increased central pulmonary vasculature and small bilateral  pleural effusions; no large consolidative process  -2021 echo showed ejection fraction 20-25% with grade 1 left ventricular diastolic dysfunction  -repeat echo  -IV Lasix 20 mg b.i.d.    Elevated troponin  -patient denies chest pain  -troponin 0.078  -EKG does not have any significant ST elevations or T-wave depression  -repeat EKG pending      Weakness  Decline in mental status  -possible recrudescence of old stroke s/t HF exacerbation  -CT head:  Encephalomalacia to the right and left frontal lobe along with the left parietal lobe some new since September 2021  -Dr Linares with Neurology suggested MRI if the patient does not improve  -Repeat ECHO with bubble study, A1c, lipids pending  -Pt is has been prescribed lipitor and aspirin 81mg daily for stroke prevention  -PT/OT consulted  -mental status improved  -Will check B1, b6, folate, B12, ammonia, TSH and RPR      Essential hypertension  Patient takes losartan at home        VTE Risk Mitigation (From admission, onward)         Ordered     heparin (porcine) injection 5,000 Units  Every 8 hours         11/26/22 1818     IP VTE HIGH RISK PATIENT  Once         11/26/22 1818     Place sequential compression device  Until discontinued         11/26/22 1818                   Rola Negron DNP  Department of Hospital Medicine   [facility] ([facility])

## 2022-11-27 NOTE — SUBJECTIVE & OBJECTIVE
Interval History:  No acute events overnight.  The patient denies lower extremity swelling acute dyspnea.  He endorses occasional orthopnea.  He denies confusion.  Denies any weakness.  He states he has progressive weakness over the last 3-4 weeks and is interested in working with therapy services while here.    Review of Systems   Constitutional:  Negative for chills and fever.   HENT:  Negative for congestion.    Respiratory:  Negative for shortness of breath.    Cardiovascular:  Negative for chest pain and palpitations.   Gastrointestinal:  Negative for abdominal pain.   Musculoskeletal:  Negative for back pain.   Skin:  Negative for wound.   Neurological:  Positive for weakness (progressive). Negative for dizziness and headaches.   Psychiatric/Behavioral:  Negative for agitation and confusion.    Objective:     Vital Signs (Most Recent):  Temp: 98.6 °F (37 °C) (11/27/22 1527)  Pulse: 79 (11/27/22 1613)  Resp: 18 (11/27/22 1527)  BP: 129/85 (11/27/22 1527)  SpO2: 97 % (11/27/22 1527) Vital Signs (24h Range):  Temp:  [96.2 °F (35.7 °C)-98.6 °F (37 °C)] 98.6 °F (37 °C)  Pulse:  [72-97] 79  Resp:  [16-18] 18  SpO2:  [94 %-98 %] 97 %  BP: (129-160)/() 129/85     Weight: 60.1 kg (132 lb 7.9 oz)  Body mass index is 22.74 kg/m².    Intake/Output Summary (Last 24 hours) at 11/27/2022 1744  Last data filed at 11/27/2022 1629  Gross per 24 hour   Intake --   Output 1700 ml   Net -1700 ml      Physical Exam  Vitals reviewed.   Constitutional:       General: He is not in acute distress.     Appearance: Normal appearance.      Comments: Thin.   Cardiovascular:      Rate and Rhythm: Normal rate and regular rhythm.   Pulmonary:      Breath sounds: No wheezing or rales.   Abdominal:      General: Bowel sounds are normal.      Tenderness: There is no abdominal tenderness.   Musculoskeletal:      Cervical back: Neck supple.      Right lower leg: No edema.      Left lower leg: No edema.   Skin:     General: Skin is warm and  dry.      Capillary Refill: Capillary refill takes less than 2 seconds.   Neurological:      General: No focal deficit present.      Mental Status: He is alert and oriented to person, place, and time.      Cranial Nerves: No cranial nerve deficit.   Psychiatric:         Mood and Affect: Mood normal.         Behavior: Behavior normal.       Significant Labs: All pertinent labs within the past 24 hours have been reviewed.  CBC:   Recent Labs   Lab 11/26/22  1138   WBC 9.96   HGB 14.9   HCT 44.7        CMP:   Recent Labs   Lab 11/26/22  1138 11/27/22  0543    143   K 3.7 3.5    110   CO2 25 24   * 181*   BUN 23 22   CREATININE 1.1 1.2   CALCIUM 9.1 8.9   PROT 7.1  --    ALBUMIN 3.6  --    BILITOT 1.5*  --    ALKPHOS 71  --    AST 32  --    ALT 35  --    ANIONGAP 9 9       Significant Imaging: I have reviewed all pertinent imaging results/findings within the past 24 hours.

## 2022-11-27 NOTE — HOSPITAL COURSE
82-year-old man with prior CVA in 3 weeks progressive weakness who was brought to the emergency department by his children for reports of confusion.  The patient was alert and oriented to person place and time the day of admission.  He was started on treatment for decompensated heart failure, but appeared comfortable and not in florid CHF exacerbation the following morning.  They reports that Neurology was consulted from the emergency department and recommended MRI of the brain if there is no appreciable improvement in his condition. MRI brain with subacute infarcts, no acute hemorrhage. Stable and at baseline time of discharge, discharge to rehab with return precautions discussed, no further questions at dc

## 2022-11-27 NOTE — CONSULTS
Food & Nutrition  Education    Diet Education: Low Na, Fluid Restriction   Time Spent: RD remote  Learners: Patient       Nutrition Education provided with handouts:   + Clinical Reference attachments to d/c documents      Comments:  Patient with a 2 gm Na, 1500ml FR diet.  LBM:11/24.  Decreased po intake noted at this time.  Oral supplements recommended.  Labs reviewed.  NKFA.  RD to continue to monitor po intake, tolerance, weights and labs.    BMP  Lab Results   Component Value Date     11/27/2022    K 3.5 11/27/2022     11/27/2022    CO2 24 11/27/2022    BUN 22 11/27/2022    CREATININE 1.2 11/27/2022    CALCIUM 8.9 11/27/2022    ANIONGAP 9 11/27/2022    EGFRNORACEVR 60 11/27/2022     Lab Results   Component Value Date    HGBA1C 5.0 11/27/2022     Intake/Output - Last 3 Shifts         11/25 0700  11/26 0659 11/26 0700  11/27 0659 11/27 0700  11/28 0659    IV Piggyback  1803     Total Intake(mL/kg)  1803 (30)     Urine (mL/kg/hr)  850     Total Output  850     Net  +953                      All questions and concerns answered. Dietitian's contact information provided.       Follow-Up: Yes     Please Re-consult as needed        Thanks!  Brenda Mckeon, MS, RDN, LDN

## 2022-11-27 NOTE — HPI
Mr Perkins is an 82-year-old male with a past medical history of hypertension, heart failure, and CVA.  He came to emergency department today via EMS after his family found him to be more lethargic, weak and with some confusion today.  No family at bedside.  Per chart, he had a stroke in 2021 and has right-sided weakness.  On exam patient is oriented to self, place, and month.  He is able to respond appropriately and follow commands.  Patient reports some increased weakness and decreased sensation on the right side along with some slowing of his speech.  The right-sided weakness and decreased sensation has been ongoing for multiple months and speech changes began 3 days ago.  No dysarthria or aphasia on exam.  Patient did become tearful because he is unable to get up and walk.  In the ED patient was afebrile with a white blood cell count of 9.9.  Lactic acid 1.4 with a repeat of 2.1.  Chest x-ray reports small bilateral pleural effusions suggestive of heart failure with a BNP of 2854.  Patient had a troponin of 0.078 with a repeat pending.  Patient had a CT of his head which showed remote infarcts.  ED physician spoke with Dr. Linares with the neurology team who recommended an MRI there is no improvement tomorrow.  Patient received lactated Ringer's under septic protocol.  Lasix 20 mg IV was given also.  Patient admitted to hospital medicine for further management.

## 2022-11-27 NOTE — PLAN OF CARE
Problem: Occupational Therapy  Goal: Occupational Therapy Goal  Description: Goals to be met by: 12/11/2022     Patient will increase functional independence with ADLs by performing:    UE Dressing with Stand-by Assistance.  LE Dressing with Stand-by Assistance.  Grooming while standing at sink with Stand-by Assistance.  Toileting from bedside commode with Stand-by Assistance for hygiene and clothing management.   Toilet transfer to bedside commode with Stand-by Assistance.    Outcome: Ongoing, Progressing     Initial OT eval/treat complete.  Step transfer bed<>BSC with MIN A and increased verbal cuing for safe hand placement during transitions.  Cleaning front mavis region in stance with CGA for steadying/safety due to impaired balance/stability and doffing dirty adult brief pulling down in stance and increased time and skilled instruction for unthreading BLE.  Previously MOD I with ADL, ambulation, and light household chores.  Though currently requiring MIN A for transfers and CGA for all standing ADL tasks at this time.  Will require intense therapy to return safely to PLOF and previous living situation.  Has grab bars in tub/shower combo and RW.  Currently needs BSC and TTB though will defer DME needs to next level of care.  Recommend post acute OT in IRF.  To benefit from continued acute care OT services to increase independence in self-care/functional transfers.  OT to follow.

## 2022-11-27 NOTE — ASSESSMENT & PLAN NOTE
Although there are laboratory (elevated BNP),  imaging abnormalities (cardiomegaly with pulmonary vascular congestion on chest x-ray), and prior sonography (EF 20% with grade 1 diastolic dysfunction), I do not see a clinical picture compelling for acute decompensated heart failure at this time.    Of note, the patient received a 30 cc/kilogram of IV fluid bolus upon arrival to the emergency department.  Shortly after, he was started on IV diuresis.    Repeat echo ordered   Decrease IV Lasix frequency to daily  Continue losartan

## 2022-11-27 NOTE — ASSESSMENT & PLAN NOTE
Decline in mental status  -possible recrudescence of old stroke s/t HF exacerbation  -CT head:  Encephalomalacia to the right and left frontal lobe along with the left parietal lobe some new since September 2021  -Dr Linares with Neurology suggested MRI if the patient does not improve  -Repeat ECHO with bubble study, A1c, lipids pending  -Pt is has been prescribed lipitor and aspirin 81mg daily for stroke prevention  -PT/OT consulted  -mental status improved  -Will check B1, b6, folate, B12, ammonia, TSH and RPR

## 2022-11-27 NOTE — PLAN OF CARE
11/27/22 0844   BALL Message   Medicare Outpatient and Observation Notification regarding financial responsibility Given to patient/caregiver;Explained to patient/caregiver;Signed/date by patient/caregiver   Date BALL was signed 11/27/22   Time BALL was signed 0800

## 2022-11-27 NOTE — ASSESSMENT & PLAN NOTE
No active chest pain.  No significant ST changes on EKG.  He appears to have chronic troponin elevation.  Continue aspirin   Continue statin  Continue cardiac monitoring

## 2022-11-27 NOTE — PLAN OF CARE
PCP Renaldo Molina  Pharmacy 1)[facility] 2)[facility]     Lives with wife & 2 sons - mostly independent in ADLs with DME - son requesting home health at discharge - son will provide transportation home        11/27/22 0269   Discharge Assessment   Assessment Type Discharge Planning Assessment   Confirmed/corrected address, phone number and insurance Yes   Confirmed Demographics Correct on Facesheet   Source of Information patient;family   Does patient/caregiver understand observation status Yes   Lives With spouse;child(wesly), adult   Do you expect to return to your current living situation? Yes   Do you have help at home or someone to help you manage your care at home? Yes   Prior to hospitilization cognitive status: Alert/Oriented   Current cognitive status: Alert/Oriented   Walking or Climbing Stairs Difficulty ambulation difficulty, requires equipment;transferring difficulty, requires equipment   Dressing/Bathing Difficulty bathing difficulty, requires equipment   Equipment Currently Used at Home walker, rolling;shower chair   Do you currently have service(s) that help you manage your care at home? No   Do you take prescription medications? Yes   Do you have prescription coverage? Yes   Do you have any problems affording any of your prescribed medications? No   Is the patient taking medications as prescribed? yes   Who is going to help you get home at discharge? son   How do you get to doctors appointments? family or friend will provide   Are you on dialysis? No   Discharge Plan A Home Health   DME Needed Upon Discharge    (TBD)   Discharge Plan discussed with: Patient;Adult children   Discharge Barriers Identified None   Physical Activity   On average, how many days per week do you engage in moderate to strenuous exercise (like a brisk walk)? 0 days   On average, how many minutes do you engage in exercise at this level? 0 min   Financial Resource Strain   How hard is it for you to pay for the very  basics like food, housing, medical care, and heating? Not hard   Housing Stability   In the last 12 months, was there a time when you were not able to pay the mortgage or rent on time? N   In the last 12 months, how many places have you lived? 1   In the last 12 months, was there a time when you did not have a steady place to sleep or slept in a shelter (including now)? N   Transportation Needs   In the past 12 months, has lack of transportation kept you from medical appointments or from getting medications? no   In the past 12 months, has lack of transportation kept you from meetings, work, or from getting things needed for daily living? No   Food Insecurity   Within the past 12 months, you worried that your food would run out before you got the money to buy more. Never true   Within the past 12 months, the food you bought just didn't last and you didn't have money to get more. Never true   Stress   Do you feel stress - tense, restless, nervous, or anxious, or unable to sleep at night because your mind is troubled all the time - these days? Rather much   Social Connections   In a typical week, how many times do you talk on the phone with family, friends, or neighbors? More than 3   How often do you get together with friends or relatives? Three times   How often do you attend Muslim or Temple services? More than 4   Do you belong to any clubs or organizations such as Muslim groups, unions, fraternal or athletic groups, or school groups? No   How often do you attend meetings of the clubs or organizations you belong to? Never   Are you , , , , never , or living with a partner?    Alcohol Use   Q1: How often do you have a drink containing alcohol? Never   Q2: How many drinks containing alcohol do you have on a typical day when you are drinking? None   Q3: How often do you have six or more drinks on one occasion? Never

## 2022-11-27 NOTE — ASSESSMENT & PLAN NOTE
-patient denies chest pain  -troponin 0.078  -EKG does not have any significant ST elevations or T-wave depression  -repeat EKG pending

## 2022-11-27 NOTE — SUBJECTIVE & OBJECTIVE
Past Medical History:   Diagnosis Date    Hypertension     Stroke        History reviewed. No pertinent surgical history.    Review of patient's allergies indicates:  No Known Allergies    No current facility-administered medications on file prior to encounter.     Current Outpatient Medications on File Prior to Encounter   Medication Sig    atorvastatin (LIPITOR) 80 MG tablet Take 1 tablet (80 mg total) by mouth every evening.    losartan (COZAAR) 25 MG tablet Take 1 tablet (25 mg total) by mouth once daily.    acetaminophen (TYLENOL) 325 MG tablet Take 650 mg by mouth every 8 (eight) hours as needed.    cyanocobalamin (VITAMIN B-12) 1000 MCG tablet Take 1 tablet (1,000 mcg total) by mouth once daily.     Family History       Problem Relation (Age of Onset)    Alcohol abuse Father    Hypertension Mother, Paternal Aunt, Paternal Grandmother, Paternal Grandfather    No Known Problems Sister, Daughter, Son          Tobacco Use    Smoking status: Never    Smokeless tobacco: Never   Substance and Sexual Activity    Alcohol use: Not Currently     Comment: no alcohol for 6 months    Drug use: Never    Sexual activity: Not Currently     Review of Systems   Constitutional:  Positive for activity change. Negative for appetite change, chills and fever.   HENT: Negative.  Negative for congestion, mouth sores and trouble swallowing.    Eyes:  Negative for photophobia and pain.        Ongoing decreased vision   Respiratory:  Positive for shortness of breath (with activity). Negative for cough, choking and chest tightness.    Cardiovascular: Negative.  Negative for chest pain, palpitations and leg swelling.   Gastrointestinal: Negative.  Negative for abdominal distention, abdominal pain, blood in stool, constipation, diarrhea, nausea and vomiting.   Endocrine: Negative.  Negative for polydipsia, polyphagia and polyuria.   Genitourinary: Negative.  Negative for decreased urine volume, difficulty urinating, dysuria and enuresis.    Musculoskeletal:  Positive for gait problem (difficulty with ambulation). Negative for arthralgias and back pain.   Skin: Negative.  Negative for pallor and rash.   Allergic/Immunologic: Negative.  Negative for environmental allergies and food allergies.   Neurological:  Positive for weakness. Negative for dizziness, facial asymmetry, speech difficulty and headaches.   Hematological: Negative.    Psychiatric/Behavioral: Negative.  Negative for agitation (Right weaker than left), behavioral problems and confusion.    Objective:     Vital Signs (Most Recent):  Temp: 97.5 °F (36.4 °C) (11/26/22 1910)  Pulse: 88 (11/26/22 1954)  Resp: 18 (11/26/22 1910)  BP: (!) 159/100 (11/26/22 1954)  SpO2: (!) 94 % (11/26/22 1910)   Vital Signs (24h Range):  Temp:  [97.5 °F (36.4 °C)] 97.5 °F (36.4 °C)  Pulse:  [78-90] 88  Resp:  [14-18] 18  SpO2:  [94 %-99 %] 94 %  BP: (129-160)/() 159/100     Weight: 60.1 kg (132 lb 7.9 oz)  Body mass index is 22.74 kg/m².    Physical Exam  Vitals reviewed.   Constitutional:       General: He is not in acute distress.     Appearance: Normal appearance.   HENT:      Head: Normocephalic and atraumatic.      Mouth/Throat:      Mouth: Mucous membranes are moist.      Pharynx: Oropharynx is clear. No posterior oropharyngeal erythema.   Eyes:      Extraocular Movements: Extraocular movements intact.      Pupils: Pupils are equal, round, and reactive to light.   Cardiovascular:      Rate and Rhythm: Normal rate and regular rhythm.      Pulses: Normal pulses.      Heart sounds: Normal heart sounds. No murmur heard.  Pulmonary:      Effort: Pulmonary effort is normal. No respiratory distress.      Breath sounds: Normal breath sounds. No wheezing.   Abdominal:      General: Bowel sounds are normal. There is no distension.      Palpations: Abdomen is soft.      Tenderness: There is no abdominal tenderness. There is no guarding.   Musculoskeletal:         General: No swelling, tenderness or signs of  injury. Normal range of motion.      Cervical back: Normal range of motion and neck supple. No rigidity or tenderness.   Skin:     General: Skin is warm and dry.   Neurological:      Mental Status: He is alert and oriented to person, place, and time.      Cranial Nerves: Cranial nerve deficit (decreased visioni right eye (not new)) present.      Sensory: No sensory deficit.      Motor: No weakness.      Comments: Decreased sensation to right lower extremity and right cheek (not new)  Right lower extremity MS 3+/5, Left lower extremity MS 4+/5 (not new)   Psychiatric:         Mood and Affect: Mood normal.         Behavior: Behavior normal.         CRANIAL NERVES     CN III, IV, VI   Pupils are equal, round, and reactive to light.     Significant Labs: All pertinent labs within the past 24 hours have been reviewed.  A1C:   Recent Labs   Lab 11/22/22  0825   HGBA1C 4.8     BMP:   Recent Labs   Lab 11/26/22  1138   *      K 3.7      CO2 25   BUN 23   CREATININE 1.1   CALCIUM 9.1   MG 2.1     CBC:   Recent Labs   Lab 11/26/22  1138   WBC 9.96   HGB 14.9   HCT 44.7        CMP:   Recent Labs   Lab 11/26/22  1138      K 3.7      CO2 25   *   BUN 23   CREATININE 1.1   CALCIUM 9.1   PROT 7.1   ALBUMIN 3.6   BILITOT 1.5*   ALKPHOS 71   AST 32   ALT 35   ANIONGAP 9     Troponin:   Recent Labs   Lab 11/26/22  1138   TROPONINI 0.078*     TSH:   Recent Labs   Lab 11/22/22  0825   TSH 1.741     Urine Culture: No results for input(s): LABURIN in the last 48 hours.  Urine Studies:   Recent Labs   Lab 11/26/22  1258   COLORU Yellow   APPEARANCEUA Clear   PHUR 6.0   SPECGRAV 1.015   PROTEINUA 1+*   GLUCUA Negative   KETONESU Negative   BILIRUBINUA Negative   OCCULTUA Trace*   NITRITE Negative   UROBILINOGEN 1.0   LEUKOCYTESUR Negative   RBCUA 4   WBCUA 1   BACTERIA Occasional   HYALINECASTS 5*       Significant Imaging: I have reviewed all pertinent imaging results/findings within the past  24 hours.  X-Ray Chest AP Portable  Narrative: EXAMINATION:  XR CHEST AP PORTABLE    CLINICAL HISTORY:  Sepsis;    TECHNIQUE:  Single frontal view of the chest was performed.    COMPARISON:  Chest radiograph 04/01/2022    FINDINGS:  Monitoring leads overlie the chest.  Patient is rotated.    Cardiomediastinal silhouette is midline and prominent grossly similar to prior.  There is increased prominence of the central pulmonary vasculature with bilateral diffuse nonspecific interstitial coarsening with central peribronchial cuffing in a mid to lower lung zone distribution.  There is chronic elevation of left hemidiaphragm with new blunting of the left costophrenic angle and hazy opacification of the left lung base.  There is also new slight blunting of the right costophrenic angle.  No large consolidation or pneumothorax definitively seen.  Hilar contours are stable.  Acute osseous process seen.  PA and lateral views can be obtained.  Impression: Findings suggesting worsening pulmonary edema/CHF pattern and small bilateral pleural effusions.  Interstitial type pneumonia not excluded.  No large consolidative process.    Electronically signed by: Toney Belle MD  Date:    11/26/2022  Time:    13:13  CT Head Without Contrast  Narrative: EXAMINATION:  CT HEAD WITHOUT CONTRAST    CLINICAL HISTORY:  Mental status change, unknown cause;    TECHNIQUE:  Low dose axial CT images obtained throughout the head without intravenous contrast. Sagittal and coronal reconstructions were performed.    COMPARISON:  CTA head and neck 09/07/2021 and MRI brain 09/07/2021    FINDINGS:  Patient is rotated in the scanner.  Patient motion with beam hardening and streak artifact limited evaluation on initial images but mostly resolved with repeat scanning.    Intracranial compartment: Generalized cerebral volume loss.    The ventricles are midline and stable in size and configuration without distortion by mass effect or acute hydrocephalus, noting  continued ventriculomegaly out of proportion to sulcal which may reflect sequela of chronic normal pressure hydrocephalus versus central predominant atrophy.  No extra-axial blood or fluid collections definitively seen.    New small area of encephalomalacia involving the posterolateral high right frontal lobe and small to moderate-sized area of encephalomalacia involving the parasagittal left parietal lobe and posterior left frontal lobe near the vertex, suggesting sequela of remote infarct, but both new from 09/07/2021 study.  Superimposed acute ischemia in these regions not excluded.  Grossly stable patchy and confluent hypoattenuation of the subcortical and periventricular white matter consistent with chronic microvascular ischemic change.  Right pontine and bilateral thalami suspected remote lacunar type infarcts, better demonstrated on previous MRI study.  No parenchymal mass, hemorrhage, edema or definite acute major vascular distribution infarct.  Skull base atherosclerotic vascular calcifications noted.    Skull/extracranial contents (limited evaluation): No fracture. Mastoid air cells and paranasal sinuses are essentially clear.  Impression: 1. Right frontal lobe and high posterior left frontal and left parietal lobe encephalomalacia likely sequela of remote infarction, but new from 09/07/2021 studies.  Superimposed acute ischemia not excluded.  If persistent neurologic deficit, MRI brain can be obtained.  2. Grossly stable additional chronic findings as above.  3. Previous bilateral subdural collections are not seen on today's study.  This report was flagged in Epic as abnormal.    Electronically signed by: Toney Belle MD  Date:    11/26/2022  Time:    12:50

## 2022-11-27 NOTE — ASSESSMENT & PLAN NOTE
-BNP 2,854  -CXR:  Increased central pulmonary vasculature and small bilateral pleural effusions; no large consolidative process  -2021 echo showed ejection fraction 20-25% with grade 1 left ventricular diastolic dysfunction  -repeat echo  -IV Lasix 20 mg b.i.d.

## 2022-11-27 NOTE — PLAN OF CARE
Problem: Physical Therapy  Goal: Physical Therapy Goal  Description: Goals to be met by: 12/27/22    Patient will perform the following to increase strength, improve mobility, and return to prior level of function:    1. Supine <> sit with CGA.  2. Sit<>stand with SPV with least restrictive assistive device.  3. Gait x 50 feet with SBA with least restrictive assistive device.  4. Ascend/descend 15 step(s) with bilateral handrails and CGA.       Outcome: Ongoing, Progressing       PT orders received. PT evaluation completed and goals/POC established. Pt tolerated evaluation well with no adverse reactions. Pt performed in-room ambulation with assistance and RW. PT will continue to follow and progress goals as tolerated. Recommend discharge to inpatient rehab.

## 2022-11-27 NOTE — PT/OT/SLP EVAL
Physical Therapy Evaluation    Patient Name:  Gregorio Bishop   MRN:  [id number]    Recommendations:     Discharge Recommendations:  rehabilitation facility   Discharge Equipment Recommendations:  (TBD)   Barriers to discharge: Inaccessible home, Decreased caregiver support, and functional mobility impairments    Assessment:     Gregorio Bishop is a 82 y.o. male admitted with a medical diagnosis of Acute on chronic combined systolic and diastolic heart failure. He has a past medical history that includes but is not limited to HTN, CVA, AMS, frequent falls, and dilated cardiomyopathy. He presents with the following impairments/functional limitations:  weakness, impaired endurance, impaired self care skills, impaired functional mobility, gait instability, impaired balance, decreased lower extremity function, impaired cognition, decreased safety awareness, edema.    PT orders received. PT evaluation completed and goals/POC established. Pt tolerated evaluation well with no adverse reactions. Pt performed in-room ambulation with assistance and RW. PT will continue to follow and progress goals as tolerated. Recommend discharge to inpatient rehab.     Prior to recently, pt was modified independent with mobility and self-care and there is expectation of returning to prior level of function to maintain independence avoiding readmission. Pt is at high risk of unplanned readmission due to fall risk and lack of 24 hour caregiver in prior setting. The lower level of care cannot provide total interdisciplinary approach needed. Pt is able to tolerate 3 hours of daily therapy. Pt is pleasant and motivated to return to prior level of function.     Rehab Prognosis: Good; patient would benefit from acute skilled PT services to address these deficits and reach maximum level of function.    Recent Surgery: * No surgery found *      Plan:     During this hospitalization, patient to be seen 5 x/week to address the identified rehab  "impairments via gait training, therapeutic activities, therapeutic exercises, neuromuscular re-education and progress toward the following goals:    Plan of Care Expires:  12/27/22    Subjective     Chief Complaint: "I feel tired but okay"  Patient/Family Comments/goals: Pt agreeable to therapy evaluation, eager to attempt to ambulate  Pain/Comfort:  Pain Rating 1: 0/10    Patients cultural, spiritual, Restorationism conflicts given the current situation: no    Living Environment:  Pt lives with wife in a H with 15 ERNIE (bilateral handrails available). He has access to a tub-shower combination in the home. Until recently, the pt was modified independent and ambulatory with a RW. He has recently become more dependent on wife / family for assistance with ambulation, ADLs and transfers in and out of the shower. Equipment used at home: walker, rolling, shower chair.  DME owned (not currently used): none.  Upon discharge, patient will have assistance from wife.    Objective:     Communicated with KELY Tsang prior to session.  Patient found HOB elevated with bed alarm, telemetry, peripheral IV  upon PT entry to room.    General Precautions: Standard, fall   Orthopedic Precautions:N/A   Braces: N/A  Respiratory Status: Nasal cannula, flow 1 L/min    Exams:  Cognition:   Patient is oriented to self and place, not time.  Pt follows approximately 100% of simple commands.    Mood: Pleasant and cooperative.   Safety Awareness: impaired  Musculoskeletal:  Posture:  rounded shoulders, forward head  LE ROM/Strength:   R ROM: WFL  L ROM: WFL  R Strength:   Hip flexion: 3+/5  Knee extension: 3+/5  Dorsiflexion: 3+/5   L Strength:   Hip flexion: 4/5  Knee extension: 4/5  Dorsiflexion: 4/5   Neuromuscular:  Sensation: Intact to light touch bilateral LEs.   Tone/Reflexes: No impairments identified with functional mobility. No formal testing performed.   Balance:   Static sitting: SBA  Dynamic sitting: CGA  Static standing: CGA  Dynamic " standing: CGA  Visual-vestibular: No impairments identified with functional mobility. No formal testing performed.  Integument: Visible skin intact  Cardiopulmonary:  Edema: noted to medial right ankle    Functional Mobility:  Bed Mobility:     Supine to Sit: minimum assistance  Sit to Supine: moderate assistance  Transfers:     Sit to Stand:  contact guard assistance with rolling walker (2 trials)  Cues required for proper hand placement.   Gait: x2 trials of 5 steps forward and 5 steps backward. Pt with decreased speed, [place], and bilateral step length. Decreased stability, however, no LOB noted. Pt with mild steppage gait pattern in order to clear right LE.     AM-PAC 6 CLICK MOBILITY  Total Score:16     Treatment & Education:  Bed mobility, transfer, and gait training as described above.  Pt required seated rest break between ambulation trials.     PT educated patient and son re:   PT plan of care/role of PT  Use of RW  Fall and safety precautions  Gait deviations  Discharge plan  Use of call light (don't get up without assistance)  Pt and son verbalized understanding     The patient is safe to transfer with RN assist    Patient left HOB elevated with all lines intact, call button in reach, bed alarm on, and son present.    GOALS:   Multidisciplinary Problems       Physical Therapy Goals          Problem: Physical Therapy    Goal Priority Disciplines Outcome Goal Variances Interventions   Physical Therapy Goal     PT, PT/OT Ongoing, Progressing     Description: Goals to be met by: 12/27/22    Patient will perform the following to increase strength, improve mobility, and return to prior level of function:    1. Supine <> sit with CGA.  2. Sit<>stand with SPV with least restrictive assistive device.  3. Gait x 50 feet with SBA with least restrictive assistive device.  4. Ascend/descend 15 step(s) with bilateral handrails and CGA.                            History:     Past Medical History:   Diagnosis Date     Hypertension     Stroke        History reviewed. No pertinent surgical history.    Time Tracking:     PT Received On: 11/27/22  PT Start Time: 0941     PT Stop Time: 1001  PT Total Time (min): 20 min     Billable Minutes: Evaluation 12 and Therapeutic Activity 8      11/27/2022

## 2022-11-27 NOTE — ASSESSMENT & PLAN NOTE
There were initial reports of mental status decline concerning for CVA.  No new neurologic deficit was identified.  CT of the head showed encephalomalacia consistent with prior CVA.  Patient is conversant and and oriented.  Exam is nonfocal.  Repeat lipid panel resulted.  TSH within normal limits.    MRI brain without contrast to evaluate for acute/subacute infarction   B1, B6, B12 levels ordered and are pending   RPR ordered and is pending  PT and OT following   Echo with bubble study ordered  Continue aspirin and statin

## 2022-11-27 NOTE — PROGRESS NOTES
[facility] Medicine  Progress Note    Patient Name: Gregorio Bishop  MRN: [id number]  Patient Class: OP- Observation   Admission Date: 11/26/2022  Length of Stay: 0 days  Attending Physician: Kristofer Lara MD  Primary Care Provider: Renaldo Molina MD        Subjective:     Principal Problem:Weakness        HPI:  Mr Perkins is an 82-year-old male with a past medical history of hypertension, heart failure, and CVA.  He came to emergency department today via EMS after his family found him to be more lethargic, weak and with some confusion today.  No family at bedside.  Per chart, he had a stroke in 2021 and has right-sided weakness.  On exam patient is oriented to self, place, and month.  He is able to respond appropriately and follow commands.  Patient reports some increased weakness and decreased sensation on the right side along with some slowing of his speech.  The right-sided weakness and decreased sensation has been ongoing for multiple months and speech changes began 3 days ago.  No dysarthria or aphasia on exam.  Patient did become tearful because he is unable to get up and walk.  In the ED patient was afebrile with a white blood cell count of 9.9.  Lactic acid 1.4 with a repeat of 2.1.  Chest x-ray reports small bilateral pleural effusions suggestive of heart failure with a BNP of 2854.  Patient had a troponin of 0.078 with a repeat pending.  Patient had a CT of his head which showed remote infarcts.  ED physician spoke with Dr. Linares with the neurology team who recommended an MRI there is no improvement tomorrow.  Patient received lactated Ringer's under septic protocol.  Lasix 20 mg IV was given also.  Patient admitted to hospital medicine for further management.      Overview/Hospital Course:  82-year-old man with prior CVA in 3 weeks progressive weakness who was brought to the emergency department by his children for reports of confusion.  The patient was alert and  oriented to person place and time the day of admission.  He was started on treatment for decompensated heart failure, but appeared comfortable and not in florid CHF exacerbation the following morning.  They reports that Neurology was consulted from the emergency department and recommended MRI of the brain if there is no appreciable improvement in his condition.      Interval History:  No acute events overnight.  The patient denies lower extremity swelling acute dyspnea.  He endorses occasional orthopnea.  He denies confusion.  Denies any weakness.  He states he has progressive weakness over the last 3-4 weeks and is interested in working with therapy services while here.    Review of Systems   Constitutional:  Negative for chills and fever.   HENT:  Negative for congestion.    Respiratory:  Negative for shortness of breath.    Cardiovascular:  Negative for chest pain and palpitations.   Gastrointestinal:  Negative for abdominal pain.   Musculoskeletal:  Negative for back pain.   Skin:  Negative for wound.   Neurological:  Positive for weakness (progressive). Negative for dizziness and headaches.   Psychiatric/Behavioral:  Negative for agitation and confusion.    Objective:     Vital Signs (Most Recent):  Temp: 98.6 °F (37 °C) (11/27/22 1527)  Pulse: 79 (11/27/22 1613)  Resp: 18 (11/27/22 1527)  BP: 129/85 (11/27/22 1527)  SpO2: 97 % (11/27/22 1527) Vital Signs (24h Range):  Temp:  [96.2 °F (35.7 °C)-98.6 °F (37 °C)] 98.6 °F (37 °C)  Pulse:  [72-97] 79  Resp:  [16-18] 18  SpO2:  [94 %-98 %] 97 %  BP: (129-160)/() 129/85     Weight: 60.1 kg (132 lb 7.9 oz)  Body mass index is 22.74 kg/m².    Intake/Output Summary (Last 24 hours) at 11/27/2022 1742  Last data filed at 11/27/2022 1629  Gross per 24 hour   Intake --   Output 1700 ml   Net -1700 ml      Physical Exam  Vitals reviewed.   Constitutional:       General: He is not in acute distress.     Appearance: Normal appearance.      Comments: Thin.    Cardiovascular:      Rate and Rhythm: Normal rate and regular rhythm.   Pulmonary:      Breath sounds: No wheezing or rales.   Abdominal:      General: Bowel sounds are normal.      Tenderness: There is no abdominal tenderness.   Musculoskeletal:      Cervical back: Neck supple.      Right lower leg: No edema.      Left lower leg: No edema.   Skin:     General: Skin is warm and dry.      Capillary Refill: Capillary refill takes less than 2 seconds.   Neurological:      General: No focal deficit present.      Mental Status: He is alert and oriented to person, place, and time.      Cranial Nerves: No cranial nerve deficit.   Psychiatric:         Mood and Affect: Mood normal.         Behavior: Behavior normal.       Significant Labs: All pertinent labs within the past 24 hours have been reviewed.  CBC:   Recent Labs   Lab 11/26/22  1138   WBC 9.96   HGB 14.9   HCT 44.7        CMP:   Recent Labs   Lab 11/26/22  1138 11/27/22  0543    143   K 3.7 3.5    110   CO2 25 24   * 181*   BUN 23 22   CREATININE 1.1 1.2   CALCIUM 9.1 8.9   PROT 7.1  --    ALBUMIN 3.6  --    BILITOT 1.5*  --    ALKPHOS 71  --    AST 32  --    ALT 35  --    ANIONGAP 9 9       Significant Imaging: I have reviewed all pertinent imaging results/findings within the past 24 hours.      Assessment/Plan:      * Weakness  There were initial reports of mental status decline concerning for CVA.  No new neurologic deficit was identified.  CT of the head showed encephalomalacia consistent with prior CVA.  Patient is conversant and and oriented.  Exam is nonfocal.  Repeat lipid panel resulted.  TSH within normal limits.    MRI brain without contrast to evaluate for acute/subacute infarction   B1, B6, B12 levels ordered and are pending   RPR ordered and is pending  PT and OT following   Echo with bubble study ordered  Continue aspirin and statin        Chronic combined systolic and diastolic heart failure  Although there are laboratory  (elevated BNP),  imaging abnormalities (cardiomegaly with pulmonary vascular congestion on chest x-ray), and prior sonography (EF 20% with grade 1 diastolic dysfunction), I do not see a clinical picture compelling for acute decompensated heart failure at this time.    Of note, the patient received a 30 cc/kilogram of IV fluid bolus upon arrival to the emergency department.  Shortly after, he was started on IV diuresis.    Repeat echo ordered   Decrease IV Lasix frequency to daily  Continue losartan    Essential hypertension  Continue losartan      Elevated troponin  No active chest pain.  No significant ST changes on EKG.  He appears to have chronic troponin elevation.  Continue aspirin   Continue statin  Continue cardiac monitoring        VTE Risk Mitigation (From admission, onward)         Ordered     heparin (porcine) injection 5,000 Units  Every 8 hours         11/26/22 1818     IP VTE HIGH RISK PATIENT  Once         11/26/22 1818     Place sequential compression device  Until discontinued         11/26/22 1818                Discharge Planning   RASHAD:      Code Status: Full Code   Is the patient medically ready for discharge?:     Reason for patient still in hospital (select all that apply): Treatment  Discharge Plan A: Rehab                  Kristofer Lara MD  Department of Hospital Medicine   [facility] ([facility])

## 2022-11-27 NOTE — PLAN OF CARE
Met with patient & son at bedside to discuss discharge dispo including therapy's recommendation for IRF - both agree with plan & voiced [facility] as their preferred facility - choice form signed - referral forwarded via [employer]        11/27/22 4804   Post-Acute Status   Post-Acute Authorization Placement   Post-Acute Placement Status Referrals Sent   Patient choice form signed by patient/caregiver List from System Post-Acute Care   Discharge Plan   Discharge Plan A Rehab

## 2022-11-27 NOTE — PLAN OF CARE
Patient is AAOx4. Son at bedside. O2 via NC for comfort. No acute events to note. Patient resting comfortably at present. Bed locked in lowest position with side rails up x 2. Call light within reach of patient. Will continue purposeful rounding.

## 2022-11-27 NOTE — PT/OT/SLP EVAL
Occupational Therapy   Evaluation and Treatment    Name: Gregorio Bishop  MRN: [id number]  Admitting Diagnosis:  Acute on chronic combined systolic and diastolic heart failure  Recent Surgery: * No surgery found *      Recommendations:     Discharge Recommendations: rehabilitation facility  Discharge Equipment Recommendations:  bedside commode, bath bench (currently needed though will defer to next level of care)  Barriers to discharge:  Decreased caregiver support (current functional level)    Assessment:   Initial OT eval/treat complete.  Step transfer bed<>BSC with MIN A and increased verbal cuing for safe hand placement during transitions.  Cleaning front mavis region in stance with CGA for steadying/safety due to impaired balance/stability and doffing dirty adult brief pulling down in stance and increased time and skilled instruction for unthreading BLE.  Previously MOD I with ADL, ambulation, and light household chores.  Though currently requiring MIN A for transfers and CGA for all standing ADL tasks at this time.  Will require intense therapy to return safely to PLOF and previous living situation.  Has grab bars in tub/shower combo and RW.  Currently needs BSC and TTB though will defer DME needs to next level of care.  Recommend post acute OT in IRF.  To benefit from continued acute care OT services to increase independence in self-care/functional transfers.  OT to follow.     Gregorio Bishop is a 82 y.o. male with a medical diagnosis of Acute on chronic combined systolic and diastolic heart failure.  He presents with below deficits decreasing independence in self-care/functional transfers. Performance deficits affecting function: weakness, impaired endurance, impaired self care skills, impaired functional mobility, gait instability, impaired balance, decreased safety awareness, decreased lower extremity function, decreased upper extremity function.      Rehab Prognosis: Good; patient would benefit from acute  skilled OT services to address these deficits and reach maximum level of function.       Plan:     Patient to be seen 4 x/week to address the above listed problems via self-care/home management, therapeutic activities, therapeutic exercises  Plan of Care Expires: 12/11/22  Plan of Care Reviewed with: patient    Subjective     Chief Complaint: No c/o pain.   Patient/Family Comments/goals: No goals stated at this time.     Occupational Profile:  Lives with spouse and son in [facility] with threshold entry; bathroom setup as tub/shower combo with grab bar and standard toilet.  Previously MOD I with ADL and ambulating with RW.  Pt. Does light household chores though spouse cooks.  Equipment Used at Home:  shower chair, walker, rolling  Assistance upon Discharge: Lives with son that is not home often as well as spouse that is elderly and not able to physically assist.     Pain/Comfort:  Pain Rating 1: 0/10  Pain Rating Post-Intervention 1: 0/10    Patients cultural, spiritual, Gnosticist conflicts given the current situation:  (None stated.)    Objective:     Communicated with: Trinh SHIPLEY RN prior to session.  Patient found HOB elevated with peripheral IV, bed alarm, telemetry upon OT entry to room.    General Precautions: Standard, fall (strict I&O's)   Orthopedic Precautions:N/A   Braces: N/A  Respiratory Status: Nasal cannula    Occupational Performance:    Bed Mobility:    Supine>sit with SBA, HOB elevated, and use of bed rail; sit>long sit in bed and then lowering trunk into supine.      Functional Mobility/Transfers:  Sit>stand EOB>RW with MIN A and step transfer bed<>BSC with verbal cuing for safe hand placement during transitional movements, and RLE foot drop noted during task.  R-lateral step with CGA and use of RW.     Activities of Daily Living:  Found with urine soiled pad and adult brief in place.  Steadying assist for pulling adult brief down in stance along with MOD instruction for unthreading brief while seated.   CGA for steadying while cleaning front mavis region in stance and assist for through back mavis cleaning due to being on damp pad.  Doffed/donned socks while seated EOB via cross leg tech and downward reach combo with MIN verbal cuing for task completion.     Cognitive/Visual Perceptual:  Cognitive/Psychosocial Skills:  -       Oriented to: Person, Place, Time, and Situation   -       Follows Commands/attention:Follows one-step commands  -       Communication: able to make basic needs known and answers 80% of questions appropriately  -       Memory: No Deficits noted  -       Safety awareness/insight to disability: impaired   -       Mood/Affect/Coping skills/emotional control: Cooperative  Visual/Perceptual:  -grossly intact    Physical Exam:  Postural examination/scapula alignment: -       Rounded shoulders  -       Forward head  -       forward flexed trunk  Skin integrity: Visible skin intact  Edema:  None noted  Sensation: -       Intact  light/touch to BUE  Upper Extremity Range of Motion:  -       Right Upper Extremity: WFL  -       Left Upper Extremity: WFL  Upper Extremity Strength: -       Right Upper Extremity: 4-/5 gross  -       Left Upper Extremity: 4-/5 gross   Strength: -       Right Upper Extremity: WFL  -       Left Upper Extremity: WFL  Fine Motor Coordination: -       Intact  Left hand thumb/finger opposition skills and Right hand thumb/finger opposition skills    AMPAC 6 Click ADL:  AMPAC Total Score: 17    Treatment & Education:  Educated on role of OT and POC.   Supine>sit with SBA, HOB elevated, and use of bed rail; sit>long sit in bed and then lowering trunk into supine.    Sit>stand EOB>RW with MIN A and step transfer bed<>BSC with verbal cuing for safe hand placement during transitional movements, and RLE foot drop noted during task.  R-lateral step with CGA and use of RW.   Found with urine soiled pad and adult brief in place.  Steadying assist for pulling adult brief down in stance  along with MOD instruction for unthreading brief while seated.  CGA for steadying while cleaning front mavis region in stance and assist for through back mavis cleaning due to being on damp pad.  Doffed/donned socks while seated EOB via cross leg tech and downward reach combo with MIN verbal cuing for task completion.  Received call light review and importance of calling for assist.      Patient left HOB elevated with all lines intact, call button in reach, bed alarm on, and nursing notified    GOALS:   Multidisciplinary Problems       Occupational Therapy Goals          Problem: Occupational Therapy    Goal Priority Disciplines Outcome Interventions   Occupational Therapy Goal     OT, PT/OT Ongoing, Progressing    Description: Goals to be met by: 12/11/2022     Patient will increase functional independence with ADLs by performing:    UE Dressing with Stand-by Assistance.  LE Dressing with Stand-by Assistance.  Grooming while standing at sink with Stand-by Assistance.  Toileting from bedside commode with Stand-by Assistance for hygiene and clothing management.   Toilet transfer to bedside commode with Stand-by Assistance.                         History:     Past Medical History:   Diagnosis Date    Hypertension     Stroke        History reviewed. No pertinent surgical history.    Time Tracking:     OT Date of Treatment: 11/27/22  OT Start Time: 1419  OT Stop Time: 1453  OT Total Time (min): 34 min (MD interjecting X 5-minutes)    Billable Minutes:Evaluation 10  Self Care/Home Management 19    11/27/2022

## 2022-11-28 ENCOUNTER — HOSPITAL ENCOUNTER (OUTPATIENT)
Dept: CARDIOLOGY | Facility: OTHER | Age: 82
Discharge: HOME | End: 2022-11-28
Attending: STUDENT IN AN ORGANIZED HEALTH CARE EDUCATION/TRAINING PROGRAM
Payer: MEDICARE

## 2022-11-28 VITALS
RESPIRATION RATE: 18 BRPM | HEART RATE: 89 BPM | WEIGHT: 130 LBS | WEIGHT: 130.31 LBS | SYSTOLIC BLOOD PRESSURE: 124 MMHG | BODY MASS INDEX: 22.2 KG/M2 | TEMPERATURE: 98 F | BODY MASS INDEX: 22.25 KG/M2 | OXYGEN SATURATION: 100 % | HEIGHT: 64 IN | DIASTOLIC BLOOD PRESSURE: 78 MMHG | HEIGHT: 64 IN

## 2022-11-28 LAB
ANION GAP SERPL CALC-SCNC: 10 MMOL/L (ref 8–16)
ASCENDING AORTA: 3.19 CM
AV INDEX (PROSTH): 0.67
AV MEAN GRADIENT: 2 MMHG
AV PEAK GRADIENT: 4 MMHG
AV VALVE AREA: 2.32 CM2
AV VELOCITY RATIO: 0.73
BSA FOR ECHO PROCEDURE: 1.63 M2
BUN SERPL-MCNC: 25 MG/DL (ref 8–23)
CALCIUM SERPL-MCNC: 8.6 MG/DL (ref 8.7–10.5)
CHLORIDE SERPL-SCNC: 105 MMOL/L (ref 95–110)
CO2 SERPL-SCNC: 28 MMOL/L (ref 23–29)
CREAT SERPL-MCNC: 1.1 MG/DL (ref 0.5–1.4)
CV ECHO LV RWT: 0.49 CM
DOP CALC AO PEAK VEL: 1.06 M/S
DOP CALC AO VTI: 16.7 CM
DOP CALC LVOT AREA: 3.5 CM2
DOP CALC LVOT DIAMETER: 2.1 CM
DOP CALC LVOT PEAK VEL: 0.77 M/S
DOP CALC LVOT STROKE VOLUME: 38.77 CM3
DOP CALCLVOT PEAK VEL VTI: 11.2 CM
E WAVE DECELERATION TIME: 174.57 MSEC
E/A RATIO: 2.89
ECHO LV POSTERIOR WALL: 1.19 CM (ref 0.6–1.1)
EJECTION FRACTION: 15 %
EST. GFR  (NO RACE VARIABLE): >60 ML/MIN/1.73 M^2
FRACTIONAL SHORTENING: 6 % (ref 28–44)
GLUCOSE SERPL-MCNC: 115 MG/DL (ref 70–110)
INTERVENTRICULAR SEPTUM: 1.15 CM (ref 0.6–1.1)
IVC DIAMETER: 1.84 CM
IVRT: 111.32 MSEC
LA MAJOR: 7.08 CM
LA MINOR: 7.23 CM
LA WIDTH: 5.2 CM
LEFT ATRIUM SIZE: 4.34 CM
LEFT ATRIUM VOLUME INDEX MOD: 52.7 ML/M2
LEFT ATRIUM VOLUME INDEX: 84.2 ML/M2
LEFT ATRIUM VOLUME MOD: 85.94 CM3
LEFT ATRIUM VOLUME: 137.24 CM3
LEFT INTERNAL DIMENSION IN SYSTOLE: 4.5 CM (ref 2.1–4)
LEFT VENTRICLE DIASTOLIC VOLUME INDEX: 66.4 ML/M2
LEFT VENTRICLE DIASTOLIC VOLUME: 108.24 ML
LEFT VENTRICLE MASS INDEX: 130 G/M2
LEFT VENTRICLE SYSTOLIC VOLUME INDEX: 56.7 ML/M2
LEFT VENTRICLE SYSTOLIC VOLUME: 92.35 ML
LEFT VENTRICULAR INTERNAL DIMENSION IN DIASTOLE: 4.81 CM (ref 3.5–6)
LEFT VENTRICULAR MASS: 212.13 G
LV LATERAL E/E' RATIO: 25.4 M/S
LVOT MG: 1.16 MMHG
LVOT MV: 0.51 CM/S
MAGNESIUM SERPL-MCNC: 1.8 MG/DL (ref 1.6–2.6)
MV PEAK A VEL: 0.44 M/S
MV PEAK E VEL: 1.27 M/S
MV STENOSIS PRESSURE HALF TIME: 50.63 MS
MV VALVE AREA P 1/2 METHOD: 4.35 CM2
PISA MRMAX VEL: 4.86 M/S
PISA TR MAX VEL: 3.38 M/S
POTASSIUM SERPL-SCNC: 4 MMOL/L (ref 3.5–5.1)
PV PEAK VELOCITY: 0.7 CM/S
RA MAJOR: 5.23 CM
RA PRESSURE: 8 MMHG
RA WIDTH: 4.5 CM
RIGHT VENTRICULAR END-DIASTOLIC DIMENSION: 4.02 CM
RPR SER QL: NORMAL
RV TISSUE DOPPLER FREE WALL SYSTOLIC VELOCITY 1 (APICAL 4 CHAMBER VIEW): 10 CM/S
SINUS: 3.3 CM
SODIUM SERPL-SCNC: 143 MMOL/L (ref 136–145)
STJ: 2.58 CM
TDI LATERAL: 0.05 M/S
TR MAX PG: 46 MMHG
TRICUSPID ANNULAR PLANE SYSTOLIC EXCURSION: 1.4 CM
TV REST PULMONARY ARTERY PRESSURE: 54 MMHG

## 2022-11-28 PROCEDURE — 99217 PR OBSERVATION CARE DISCHARGE: ICD-10-PCS | Mod: ,,, | Performed by: PHYSICIAN ASSISTANT

## 2022-11-28 PROCEDURE — 96376 TX/PRO/DX INJ SAME DRUG ADON: CPT

## 2022-11-28 PROCEDURE — 99223 PR INITIAL HOSPITAL CARE,LEVL III: ICD-10-PCS | Mod: ,,, | Performed by: FAMILY MEDICINE

## 2022-11-28 PROCEDURE — 25000003 PHARM REV CODE 250: Performed by: PHYSICIAN ASSISTANT

## 2022-11-28 PROCEDURE — 36415 COLL VENOUS BLD VENIPUNCTURE: CPT | Performed by: STUDENT IN AN ORGANIZED HEALTH CARE EDUCATION/TRAINING PROGRAM

## 2022-11-28 PROCEDURE — G0378 HOSPITAL OBSERVATION PER HR: HCPCS

## 2022-11-28 PROCEDURE — 97110 THERAPEUTIC EXERCISES: CPT | Mod: CQ

## 2022-11-28 PROCEDURE — 99223 1ST HOSP IP/OBS HIGH 75: CPT | Mod: ,,, | Performed by: FAMILY MEDICINE

## 2022-11-28 PROCEDURE — 96372 THER/PROPH/DIAG INJ SC/IM: CPT | Performed by: NURSE PRACTITIONER

## 2022-11-28 PROCEDURE — 63600175 PHARM REV CODE 636 W HCPCS: Performed by: STUDENT IN AN ORGANIZED HEALTH CARE EDUCATION/TRAINING PROGRAM

## 2022-11-28 PROCEDURE — 97116 GAIT TRAINING THERAPY: CPT | Mod: CQ

## 2022-11-28 PROCEDURE — 99217 PR OBSERVATION CARE DISCHARGE: CPT | Mod: ,,, | Performed by: PHYSICIAN ASSISTANT

## 2022-11-28 PROCEDURE — 93306 ECHO (CUPID ONLY): ICD-10-PCS | Mod: 26,,, | Performed by: INTERNAL MEDICINE

## 2022-11-28 PROCEDURE — 93306 TTE W/DOPPLER COMPLETE: CPT | Mod: 26,,, | Performed by: INTERNAL MEDICINE

## 2022-11-28 PROCEDURE — 25000003 PHARM REV CODE 250: Performed by: NURSE PRACTITIONER

## 2022-11-28 PROCEDURE — 93306 TTE W/DOPPLER COMPLETE: CPT

## 2022-11-28 PROCEDURE — 83735 ASSAY OF MAGNESIUM: CPT | Performed by: STUDENT IN AN ORGANIZED HEALTH CARE EDUCATION/TRAINING PROGRAM

## 2022-11-28 PROCEDURE — 80048 BASIC METABOLIC PNL TOTAL CA: CPT | Performed by: NURSE PRACTITIONER

## 2022-11-28 PROCEDURE — 63600175 PHARM REV CODE 636 W HCPCS: Performed by: NURSE PRACTITIONER

## 2022-11-28 RX ORDER — FUROSEMIDE 20 MG/1
20 TABLET ORAL DAILY
Qty: 60 TABLET | Refills: 1 | Status: ON HOLD
Start: 2022-11-28 | End: 2022-12-05 | Stop reason: SDUPTHER

## 2022-11-28 RX ORDER — LANOLIN ALCOHOL/MO/W.PET/CERES
400 CREAM (GRAM) TOPICAL ONCE
Status: COMPLETED | OUTPATIENT
Start: 2022-11-28 | End: 2022-11-28

## 2022-11-28 RX ORDER — LANOLIN ALCOHOL/MO/W.PET/CERES
1000 CREAM (GRAM) TOPICAL DAILY
Status: DISCONTINUED | OUTPATIENT
Start: 2022-11-28 | End: 2022-11-28 | Stop reason: HOSPADM

## 2022-11-28 RX ORDER — ASPIRIN 81 MG/1
81 TABLET ORAL DAILY
Qty: 30 TABLET | Refills: 2 | Status: ON HOLD
Start: 2022-11-29 | End: 2022-12-01 | Stop reason: SDUPTHER

## 2022-11-28 RX ORDER — FUROSEMIDE 20 MG/1
20 TABLET ORAL 2 TIMES DAILY
Qty: 60 TABLET | Refills: 11
Start: 2022-11-28 | End: 2022-11-28 | Stop reason: SDUPTHER

## 2022-11-28 RX ADMIN — LOSARTAN POTASSIUM 25 MG: 25 TABLET, FILM COATED ORAL at 09:11

## 2022-11-28 RX ADMIN — HEPARIN SODIUM 5000 UNITS: 5000 INJECTION INTRAVENOUS; SUBCUTANEOUS at 06:11

## 2022-11-28 RX ADMIN — FUROSEMIDE 40 MG: 10 INJECTION, SOLUTION INTRAMUSCULAR; INTRAVENOUS at 09:11

## 2022-11-28 RX ADMIN — Medication 400 MG: at 09:11

## 2022-11-28 RX ADMIN — HEPARIN SODIUM 5000 UNITS: 5000 INJECTION INTRAVENOUS; SUBCUTANEOUS at 01:11

## 2022-11-28 RX ADMIN — CYANOCOBALAMIN TAB 1000 MCG 1000 MCG: 1000 TAB at 09:11

## 2022-11-28 RX ADMIN — ASPIRIN 81 MG: 81 TABLET, COATED ORAL at 09:11

## 2022-11-28 NOTE — PLAN OF CARE
Patient is AAOx4. Slight confusion but easily reoriented. VSS on 2L NC. Up with walker and 1 person assist. Patient denied pain during the night. Medications administered per orders. No acute events overnight. Patient resting comfortably at present. Bed locked in lowest position with side rails up x 2. Call light within reach of patient. Will continue purposeful rounding.

## 2022-11-28 NOTE — PLAN OF CARE
[facility]    FACILITY TRANSFER ORDERS      Patient Name: Gregorio Bishop  YOB: 1940    PCP: Renaldo Molina MD   PCP Address: [address] / [place] [address]  PCP Phone Number: [phone]  PCP Fax: [phone]    Encounter Date: 11/28/2022    Admit to: Rehab    Vital Signs:  Routine    Diagnoses:   Active Hospital Problems    Diagnosis  POA    *Weakness [R53.1]  Yes    Chronic combined systolic and diastolic heart failure [I50.42]  Yes    Essential hypertension [I10]  Yes    Elevated troponin [R77.8]  Yes      Resolved Hospital Problems   No resolved problems to display.       Allergies:Review of patient's allergies indicates:  No Known Allergies    Diet: cardiac diet, fluid restriction: 1500mL, and 2 gram sodium diet    Activities: Activity as tolerated    Goals of Care Treatment Preferences:  Code Status: Full Code      Nursing: per facility protocol     Labs:  n/a      CONSULTS:    Physical Therapy to evaluate and treat.  and Occupational Therapy to evaluate and treat.    MISCELLANEOUS CARE:  Routine Skin for Bedridden Patients: Apply moisture barrier cream to all skin folds and wet areas in perineal area daily and after baths and all bowel movements.    WOUND CARE ORDERS  None    Medications: Review discharge medications with patient and family and provide education.      Current Discharge Medication List        START taking these medications    Details   aspirin (ECOTRIN) 81 MG EC tablet Take 1 tablet (81 mg total) by mouth once daily.  Qty: 30 tablet, Refills: 2      furosemide (LASIX) 20 MG tablet Take 1 tablet (20 mg total) by mouth once daily.  Qty: 60 tablet, Refills: 1           CONTINUE these medications which have NOT CHANGED    Details   atorvastatin (LIPITOR) 80 MG tablet Take 1 tablet (80 mg total) by mouth every evening.  Qty: 90 tablet, Refills: 3    Associated Diagnoses: Dyslipidemia      losartan (COZAAR) 25 MG tablet Take 1 tablet (25 mg  total) by mouth once daily.  Qty: 90 tablet, Refills: 3    Comments: .  Associated Diagnoses: Essential hypertension      acetaminophen (TYLENOL) 325 MG tablet Take 650 mg by mouth every 8 (eight) hours as needed.      cyanocobalamin (VITAMIN B-12) 1000 MCG tablet Take 1 tablet (1,000 mcg total) by mouth once daily.  Qty: 30 tablet, Refills: 0    Associated Diagnoses: Fatigue, unspecified type                Immunizations Administered as of 11/28/2022       No immunizations on file.            unknown    Some patients may experience side effects after vaccination.  These may include fever, headache, muscle or joint aches.  Most symptoms resolve with 24-48 hours and do not require urgent medical evaluation unless they persist for more than 72 hours or symptoms are concerning for an unrelated medical condition.          _________________________________  Bernice Abdi PA-C  11/28/2022

## 2022-11-28 NOTE — PT/OT/SLP PROGRESS
Physical Therapy Treatment    Patient Name:  Gregorio Bishop   MRN:  [id number]    Recommendations:     Discharge Recommendations:  rehabilitation facility   Discharge Equipment Recommendations:  (TBD)   Barriers to discharge: Inaccessible home, Decreased caregiver support, and functional mobility impairments    Assessment:     Gregorio Bishop is a 82 y.o. male admitted with a medical diagnosis of Weakness.  He presents with the following impairments/functional limitations:  weakness, gait instability, impaired balance, impaired endurance, impaired functional mobility, impaired self care skills.    Supine>sit with CGA  Sit>stand with RW and Gama, from bed, couch  Amb 2 x 15' with RW and CGA on RA, R steppage gait, step-to gait pattern, decreased gait speed, france and B step length  Sit>supine with Gama  Scoot toward HOB with totalA x 2  SpO2 93% on RA btwn gait bouts, HR 90; 99% in supine on 1L O2 post-activity, HR 89  Pt w/ c/o dizziness with upright, fair mobility this visit with RW, CGA for safety with all OOB mobility  Rec Rehab    Prior to recently, pt was modified independent with mobility and self-care and there is expectation of returning to prior level of function to maintain independence avoiding readmission. Pt is at high risk of unplanned readmission due to fall risk and lack of 24 hour caregiver in prior setting. The lower level of care cannot provide total interdisciplinary approach needed. Pt is able to tolerate 3 hours of daily therapy. Pt is pleasant and motivated to return to prior level of function.     Rehab Prognosis: Good; patient would benefit from acute skilled PT services to address these deficits and reach maximum level of function.    Recent Surgery: * No surgery found *      Plan:     During this hospitalization, patient to be seen 5 x/week to address the identified rehab impairments via gait training, therapeutic activities, therapeutic exercises, neuromuscular re-education and progress  toward the following goals:    Plan of Care Expires:  12/27/22    Subjective     Chief Complaint: dizzy with upright/ambulation  Patient/Family Comments/goals: pt agreeable to therapy  Pain/Comfort:  Pain Rating 1: 0/10  Pain Rating Post-Intervention 1: 0/10      Objective:     Communicated with nurse Tsang prior to session.  Patient found HOB elevated with peripheral IV, oxygen, telemetry upon PT entry to room.     General Precautions: Standard, fall   Orthopedic Precautions:N/A   Braces:    Respiratory Status: Nasal cannula, flow 1 L/min     Functional Mobility:  Bed Mobility:     Scooting: total assistance and of 2 persons  Supine to Sit: contact guard assistance  Sit to Supine: minimum assistance  Transfers:     Sit to Stand:  minimum assistance with rolling walker  Gait: 2 x 15' with RW and CGA on RA, R steppage gait, step-to gait pattern, decreased gait speed, france and B step length      AM-PAC 6 CLICK MOBILITY  Turning over in bed (including adjusting bedclothes, sheets and blankets)?: 3  Sitting down on and standing up from a chair with arms (e.g., wheelchair, bedside commode, etc.): 3  Moving from lying on back to sitting on the side of the bed?: 3  Moving to and from a bed to a chair (including a wheelchair)?: 2  Need to walk in hospital room?: 3  Climbing 3-5 steps with a railing?: 2  Basic Mobility Total Score: 16     Pt found with gown, sheets soiled with urine; gown and sheets changed, pants donned for ambulation with Gama    Treatment & Education:  Seated therex: heel raises (LLE only), LAQ, hip flexion marches x 10  Gait training as noted    Patient left HOB elevated with all lines intact, call button in reach, and family present..    GOALS:   Multidisciplinary Problems       Physical Therapy Goals          Problem: Physical Therapy    Goal Priority Disciplines Outcome Goal Variances Interventions   Physical Therapy Goal     PT, PT/OT Ongoing, Progressing     Description: Goals to be met by:  12/27/22    Patient will perform the following to increase strength, improve mobility, and return to prior level of function:    1. Supine <> sit with CGA.  2. Sit<>stand with SPV with least restrictive assistive device.  3. Gait x 50 feet with SBA with least restrictive assistive device.  4. Ascend/descend 15 step(s) with bilateral handrails and CGA.                            Time Tracking:     PT Received On: 11/28/22  PT Start Time: 1112     PT Stop Time: 1138  PT Total Time (min): 26 min     Billable Minutes: Gait Training 17 and Therapeutic Exercise 9    Treatment Type: Treatment  PT/PTA: PTA     PTA Visit Number: 1     11/28/2022

## 2022-11-29 ENCOUNTER — HOSPITAL ENCOUNTER (INPATIENT)
Facility: HOSPITAL | Age: 82
LOS: 5 days | Discharge: NURSING, REHAB OR LONG-TERM CARE | DRG: 280 | End: 2022-12-05
Attending: EMERGENCY MEDICINE | Admitting: STUDENT IN AN ORGANIZED HEALTH CARE EDUCATION/TRAINING PROGRAM
Payer: MEDICARE

## 2022-11-29 DIAGNOSIS — R79.89 TROPONIN LEVEL ELEVATED: Primary | ICD-10-CM

## 2022-11-29 DIAGNOSIS — R07.9 CHEST PAIN: ICD-10-CM

## 2022-11-29 DIAGNOSIS — I69.351 HEMIPLGA FOLLOWING CEREBRAL INFRC AFF RIGHT DOMINANT SIDE: Chronic | ICD-10-CM

## 2022-11-29 DIAGNOSIS — I50.42 CHRONIC COMBINED SYSTOLIC AND DIASTOLIC HEART FAILURE: ICD-10-CM

## 2022-11-29 DIAGNOSIS — I21.4 NSTEMI (NON-ST ELEVATED MYOCARDIAL INFARCTION): ICD-10-CM

## 2022-11-29 DIAGNOSIS — R41.82 ALTERED MENTAL STATUS, UNSPECIFIED: ICD-10-CM

## 2022-11-29 DIAGNOSIS — R53.81 DEBILITY: ICD-10-CM

## 2022-11-29 DIAGNOSIS — R94.31 QT PROLONGATION: ICD-10-CM

## 2022-11-29 DIAGNOSIS — I21.3 STEMI (ST ELEVATION MYOCARDIAL INFARCTION): ICD-10-CM

## 2022-11-29 DIAGNOSIS — I50.9 CHF (CONGESTIVE HEART FAILURE): ICD-10-CM

## 2022-11-29 LAB
BASOPHILS # BLD AUTO: 0.05 K/UL (ref 0–0.2)
BASOPHILS NFR BLD: 0.5 % (ref 0–1.9)
DIFFERENTIAL METHOD: ABNORMAL
EOSINOPHIL # BLD AUTO: 0.1 K/UL (ref 0–0.5)
EOSINOPHIL NFR BLD: 0.9 % (ref 0–8)
ERYTHROCYTE [DISTWIDTH] IN BLOOD BY AUTOMATED COUNT: 12 % (ref 11.5–14.5)
HCT VFR BLD AUTO: 39.5 % (ref 40–54)
HGB BLD-MCNC: 12.7 G/DL (ref 14–18)
IMM GRANULOCYTES # BLD AUTO: 0.02 K/UL (ref 0–0.04)
IMM GRANULOCYTES NFR BLD AUTO: 0.2 % (ref 0–0.5)
LYMPHOCYTES # BLD AUTO: 1.8 K/UL (ref 1–4.8)
LYMPHOCYTES NFR BLD: 17.3 % (ref 18–48)
MCH RBC QN AUTO: 30.2 PG (ref 27–31)
MCHC RBC AUTO-ENTMCNC: 32.2 G/DL (ref 32–36)
MCV RBC AUTO: 94 FL (ref 82–98)
MONOCYTES # BLD AUTO: 1 K/UL (ref 0.3–1)
MONOCYTES NFR BLD: 9.7 % (ref 4–15)
NEUTROPHILS # BLD AUTO: 7.4 K/UL (ref 1.8–7.7)
NEUTROPHILS NFR BLD: 71.4 % (ref 38–73)
NRBC BLD-RTO: 0 /100 WBC
PLATELET # BLD AUTO: 210 K/UL (ref 150–450)
PMV BLD AUTO: 11.1 FL (ref 9.2–12.9)
RBC # BLD AUTO: 4.2 M/UL (ref 4.6–6.2)
WBC # BLD AUTO: 10.36 K/UL (ref 3.9–12.7)

## 2022-11-29 PROCEDURE — 99291 CRITICAL CARE FIRST HOUR: CPT | Mod: GC,,, | Performed by: EMERGENCY MEDICINE

## 2022-11-29 PROCEDURE — 84443 ASSAY THYROID STIM HORMONE: CPT

## 2022-11-29 PROCEDURE — 86803 HEPATITIS C AB TEST: CPT | Performed by: PHYSICIAN ASSISTANT

## 2022-11-29 PROCEDURE — 83735 ASSAY OF MAGNESIUM: CPT

## 2022-11-29 PROCEDURE — 85025 COMPLETE CBC W/AUTO DIFF WBC: CPT

## 2022-11-29 PROCEDURE — 99285 EMERGENCY DEPT VISIT HI MDM: CPT | Mod: 25

## 2022-11-29 PROCEDURE — 80053 COMPREHEN METABOLIC PANEL: CPT

## 2022-11-29 PROCEDURE — 99291 PR CRITICAL CARE, E/M 30-74 MINUTES: ICD-10-PCS | Mod: GC,,, | Performed by: EMERGENCY MEDICINE

## 2022-11-29 PROCEDURE — 93010 ELECTROCARDIOGRAM REPORT: CPT | Mod: ,,, | Performed by: INTERNAL MEDICINE

## 2022-11-29 PROCEDURE — 83880 ASSAY OF NATRIURETIC PEPTIDE: CPT

## 2022-11-29 PROCEDURE — 87389 HIV-1 AG W/HIV-1&-2 AB AG IA: CPT | Performed by: PHYSICIAN ASSISTANT

## 2022-11-29 PROCEDURE — 83605 ASSAY OF LACTIC ACID: CPT

## 2022-11-29 PROCEDURE — 84484 ASSAY OF TROPONIN QUANT: CPT

## 2022-11-29 PROCEDURE — 93005 ELECTROCARDIOGRAM TRACING: CPT

## 2022-11-29 PROCEDURE — 93010 EKG 12-LEAD: ICD-10-PCS | Mod: ,,, | Performed by: INTERNAL MEDICINE

## 2022-11-29 NOTE — Clinical Note
Diagnosis: Troponin level elevated [989127]  Admitting Provider:: WILLIAN LAFLEUR [[id number]]  Future Attending Provider: WILLIAN LAFLEUR [[id number]]  Reason for IP Medical Treatment  (Clinical interventions that can only be accomplished in the IP setting? ) ::  NSTEMI  Estimated Length of Stay:: 2 midnights  I certify that Inpatient services for greater than or equal to 2 midnights are medically necessary:: No  Plans for Post-Acute care--if anticipated (pick the single best option):: F. IP Rehabilitation Un it Placement  Special Needs:: No Special Needs [1]

## 2022-11-29 NOTE — PLAN OF CARE
11/28/22 1819   Final Note   Assessment Type Final Discharge Note   Anticipated Discharge Disposition Rehab   Hospital Resources/Appts/Education Provided Provided patient/caregiver with written discharge plan information   Post-Acute Status   Post-Acute Placement Status Set-up Complete/Auth obtained   Patient choice form signed by patient/caregiver List with quality metrics by geographic area provided   Discharge Delays None known at this time     Patient medically cleared for discharge. SW set up the transport van for the patient    to be moved to [facility]. on [address]     Patient's family was in his room. He required no DME for this move.

## 2022-11-30 PROBLEM — I69.351 HEMIPLGA FOLLOWING CEREBRAL INFRC AFF RIGHT DOMINANT SIDE: Chronic | Status: ACTIVE | Noted: 2021-09-08

## 2022-11-30 PROBLEM — I21.4 NSTEMI (NON-ST ELEVATED MYOCARDIAL INFARCTION): Status: ACTIVE | Noted: 2022-11-30

## 2022-11-30 PROBLEM — I50.9 ACUTE DECOMPENSATED HEART FAILURE: Status: ACTIVE | Noted: 2022-11-30

## 2022-11-30 PROBLEM — R47.1 DYSARTHRIA AND ANARTHRIA: Status: ACTIVE | Noted: 2022-11-30

## 2022-11-30 LAB
ABO + RH BLD: NORMAL
ALBUMIN SERPL BCP-MCNC: 3.1 G/DL (ref 3.5–5.2)
ALBUMIN SERPL BCP-MCNC: 3.3 G/DL (ref 3.5–5.2)
ALBUMIN SERPL BCP-MCNC: 3.4 G/DL (ref 3.5–5.2)
ALLENS TEST: ABNORMAL
ALP SERPL-CCNC: 70 U/L (ref 55–135)
ALP SERPL-CCNC: 71 U/L (ref 55–135)
ALP SERPL-CCNC: 73 U/L (ref 55–135)
ALT SERPL W/O P-5'-P-CCNC: 23 U/L (ref 10–44)
ANION GAP SERPL CALC-SCNC: 10 MMOL/L (ref 8–16)
ANION GAP SERPL CALC-SCNC: 11 MMOL/L (ref 8–16)
ANION GAP SERPL CALC-SCNC: 7 MMOL/L (ref 8–16)
ANION GAP SERPL CALC-SCNC: 9 MMOL/L (ref 8–16)
AST SERPL-CCNC: 32 U/L (ref 10–40)
AST SERPL-CCNC: 32 U/L (ref 10–40)
AST SERPL-CCNC: 35 U/L (ref 10–40)
BASOPHILS # BLD AUTO: 0.05 K/UL (ref 0–0.2)
BASOPHILS # BLD AUTO: 0.06 K/UL (ref 0–0.2)
BASOPHILS # BLD AUTO: 0.07 K/UL (ref 0–0.2)
BASOPHILS NFR BLD: 0.5 % (ref 0–1.9)
BASOPHILS NFR BLD: 0.6 % (ref 0–1.9)
BASOPHILS NFR BLD: 0.7 % (ref 0–1.9)
BILIRUB SERPL-MCNC: 0.8 MG/DL (ref 0.1–1)
BILIRUB SERPL-MCNC: 1 MG/DL (ref 0.1–1)
BILIRUB SERPL-MCNC: 1.7 MG/DL (ref 0.1–1)
BILIRUB UR QL STRIP: NEGATIVE
BLD GP AB SCN CELLS X3 SERPL QL: NORMAL
BNP SERPL-MCNC: 2574 PG/ML (ref 0–99)
BUN SERPL-MCNC: 20 MG/DL (ref 8–23)
BUN SERPL-MCNC: 21 MG/DL (ref 8–23)
BUN SERPL-MCNC: 27 MG/DL (ref 8–23)
BUN SERPL-MCNC: 30 MG/DL (ref 8–23)
CALCIUM SERPL-MCNC: 8.5 MG/DL (ref 8.7–10.5)
CALCIUM SERPL-MCNC: 8.8 MG/DL (ref 8.7–10.5)
CALCIUM SERPL-MCNC: 9 MG/DL (ref 8.7–10.5)
CALCIUM SERPL-MCNC: 9.2 MG/DL (ref 8.7–10.5)
CHLORIDE SERPL-SCNC: 100 MMOL/L (ref 95–110)
CHLORIDE SERPL-SCNC: 101 MMOL/L (ref 95–110)
CHLORIDE SERPL-SCNC: 97 MMOL/L (ref 95–110)
CHLORIDE SERPL-SCNC: 99 MMOL/L (ref 95–110)
CHOLEST SERPL-MCNC: 98 MG/DL (ref 120–199)
CHOLEST/HDLC SERPL: 2.4 {RATIO} (ref 2–5)
CLARITY UR REFRACT.AUTO: CLEAR
CO2 SERPL-SCNC: 28 MMOL/L (ref 23–29)
CO2 SERPL-SCNC: 30 MMOL/L (ref 23–29)
CO2 SERPL-SCNC: 31 MMOL/L (ref 23–29)
CO2 SERPL-SCNC: 35 MMOL/L (ref 23–29)
COLOR UR AUTO: YELLOW
CREAT SERPL-MCNC: 0.9 MG/DL (ref 0.5–1.4)
CREAT SERPL-MCNC: 1 MG/DL (ref 0.5–1.4)
CREAT SERPL-MCNC: 1 MG/DL (ref 0.5–1.4)
CREAT SERPL-MCNC: 1.1 MG/DL (ref 0.5–1.4)
DELSYS: ABNORMAL
DIFFERENTIAL METHOD: ABNORMAL
EOSINOPHIL # BLD AUTO: 0 K/UL (ref 0–0.5)
EOSINOPHIL # BLD AUTO: 0.1 K/UL (ref 0–0.5)
EOSINOPHIL # BLD AUTO: 0.1 K/UL (ref 0–0.5)
EOSINOPHIL NFR BLD: 0.3 % (ref 0–8)
EOSINOPHIL NFR BLD: 0.6 % (ref 0–8)
EOSINOPHIL NFR BLD: 0.9 % (ref 0–8)
ERYTHROCYTE [DISTWIDTH] IN BLOOD BY AUTOMATED COUNT: 11.9 % (ref 11.5–14.5)
ERYTHROCYTE [DISTWIDTH] IN BLOOD BY AUTOMATED COUNT: 12.1 % (ref 11.5–14.5)
ERYTHROCYTE [DISTWIDTH] IN BLOOD BY AUTOMATED COUNT: 12.2 % (ref 11.5–14.5)
EST. GFR  (NO RACE VARIABLE): >60 ML/MIN/1.73 M^2
ESTIMATED AVG GLUCOSE: 100 MG/DL (ref 68–131)
FLOW: 1
GLUCOSE SERPL-MCNC: 108 MG/DL (ref 70–110)
GLUCOSE SERPL-MCNC: 118 MG/DL (ref 70–110)
GLUCOSE SERPL-MCNC: 199 MG/DL (ref 70–110)
GLUCOSE SERPL-MCNC: 95 MG/DL (ref 70–110)
GLUCOSE UR QL STRIP: NEGATIVE
HBA1C MFR BLD: 5.1 % (ref 4–5.6)
HCO3 UR-SCNC: 39.6 MMOL/L (ref 24–28)
HCT VFR BLD AUTO: 42 % (ref 40–54)
HCT VFR BLD AUTO: 43.1 % (ref 40–54)
HCT VFR BLD AUTO: 47.2 % (ref 40–54)
HCV AB SERPL QL IA: NORMAL
HDLC SERPL-MCNC: 41 MG/DL (ref 40–75)
HDLC SERPL: 41.8 % (ref 20–50)
HGB BLD-MCNC: 13.8 G/DL (ref 14–18)
HGB BLD-MCNC: 14.1 G/DL (ref 14–18)
HGB BLD-MCNC: 14.8 G/DL (ref 14–18)
HGB UR QL STRIP: NEGATIVE
HIV 1+2 AB+HIV1 P24 AG SERPL QL IA: NORMAL
IMM GRANULOCYTES # BLD AUTO: 0.04 K/UL (ref 0–0.04)
IMM GRANULOCYTES NFR BLD AUTO: 0.4 % (ref 0–0.5)
KETONES UR QL STRIP: NEGATIVE
LACTATE SERPL-SCNC: 1.2 MMOL/L (ref 0.5–2.2)
LACTATE SERPL-SCNC: 1.6 MMOL/L (ref 0.5–2.2)
LDLC SERPL CALC-MCNC: 48.4 MG/DL (ref 63–159)
LEUKOCYTE ESTERASE UR QL STRIP: NEGATIVE
LYMPHOCYTES # BLD AUTO: 1 K/UL (ref 1–4.8)
LYMPHOCYTES # BLD AUTO: 1.2 K/UL (ref 1–4.8)
LYMPHOCYTES # BLD AUTO: 1.9 K/UL (ref 1–4.8)
LYMPHOCYTES NFR BLD: 11.7 % (ref 18–48)
LYMPHOCYTES NFR BLD: 19 % (ref 18–48)
LYMPHOCYTES NFR BLD: 9.9 % (ref 18–48)
MAGNESIUM SERPL-MCNC: 1.9 MG/DL (ref 1.6–2.6)
MCH RBC QN AUTO: 29.7 PG (ref 27–31)
MCH RBC QN AUTO: 30.4 PG (ref 27–31)
MCH RBC QN AUTO: 30.5 PG (ref 27–31)
MCHC RBC AUTO-ENTMCNC: 31.4 G/DL (ref 32–36)
MCHC RBC AUTO-ENTMCNC: 32.7 G/DL (ref 32–36)
MCHC RBC AUTO-ENTMCNC: 32.9 G/DL (ref 32–36)
MCV RBC AUTO: 93 FL (ref 82–98)
MCV RBC AUTO: 93 FL (ref 82–98)
MCV RBC AUTO: 95 FL (ref 82–98)
MODE: ABNORMAL
MONOCYTES # BLD AUTO: 0.8 K/UL (ref 0.3–1)
MONOCYTES # BLD AUTO: 0.9 K/UL (ref 0.3–1)
MONOCYTES # BLD AUTO: 0.9 K/UL (ref 0.3–1)
MONOCYTES NFR BLD: 8 % (ref 4–15)
MONOCYTES NFR BLD: 9.3 % (ref 4–15)
MONOCYTES NFR BLD: 9.5 % (ref 4–15)
NEUTROPHILS # BLD AUTO: 6.8 K/UL (ref 1.8–7.7)
NEUTROPHILS # BLD AUTO: 7.6 K/UL (ref 1.8–7.7)
NEUTROPHILS # BLD AUTO: 7.9 K/UL (ref 1.8–7.7)
NEUTROPHILS NFR BLD: 69.6 % (ref 38–73)
NEUTROPHILS NFR BLD: 77.3 % (ref 38–73)
NEUTROPHILS NFR BLD: 80.9 % (ref 38–73)
NITRITE UR QL STRIP: NEGATIVE
NONHDLC SERPL-MCNC: 57 MG/DL
NRBC BLD-RTO: 0 /100 WBC
PCO2 BLDA: 57.8 MMHG (ref 35–45)
PH SMN: 7.44 [PH] (ref 7.35–7.45)
PH UR STRIP: 6 [PH] (ref 5–8)
PLATELET # BLD AUTO: 229 K/UL (ref 150–450)
PLATELET # BLD AUTO: 236 K/UL (ref 150–450)
PLATELET # BLD AUTO: 245 K/UL (ref 150–450)
PMV BLD AUTO: 10.7 FL (ref 9.2–12.9)
PMV BLD AUTO: 10.7 FL (ref 9.2–12.9)
PMV BLD AUTO: 11.2 FL (ref 9.2–12.9)
PO2 BLDA: 133 MMHG (ref 80–100)
POC BE: 16 MMOL/L
POC SATURATED O2: 99 % (ref 95–100)
POC TCO2: 41 MMOL/L (ref 23–27)
POCT GLUCOSE: 90 MG/DL (ref 70–110)
POTASSIUM SERPL-SCNC: 3.5 MMOL/L (ref 3.5–5.1)
POTASSIUM SERPL-SCNC: 3.5 MMOL/L (ref 3.5–5.1)
POTASSIUM SERPL-SCNC: 4.1 MMOL/L (ref 3.5–5.1)
POTASSIUM SERPL-SCNC: 4.2 MMOL/L (ref 3.5–5.1)
PROT SERPL-MCNC: 6.7 G/DL (ref 6–8.4)
PROT SERPL-MCNC: 7 G/DL (ref 6–8.4)
PROT SERPL-MCNC: 7.1 G/DL (ref 6–8.4)
PROT UR QL STRIP: NEGATIVE
RBC # BLD AUTO: 4.53 M/UL (ref 4.6–6.2)
RBC # BLD AUTO: 4.64 M/UL (ref 4.6–6.2)
RBC # BLD AUTO: 4.98 M/UL (ref 4.6–6.2)
SAMPLE: ABNORMAL
SITE: ABNORMAL
SODIUM SERPL-SCNC: 139 MMOL/L (ref 136–145)
SODIUM SERPL-SCNC: 141 MMOL/L (ref 136–145)
SP GR UR STRIP: 1 (ref 1–1.03)
TRIGL SERPL-MCNC: 43 MG/DL (ref 30–150)
TROPONIN I SERPL DL<=0.01 NG/ML-MCNC: 1.32 NG/ML (ref 0–0.03)
TROPONIN I SERPL DL<=0.01 NG/ML-MCNC: 1.36 NG/ML (ref 0–0.03)
TROPONIN I SERPL DL<=0.01 NG/ML-MCNC: 1.46 NG/ML (ref 0–0.03)
TROPONIN I SERPL DL<=0.01 NG/ML-MCNC: 1.48 NG/ML (ref 0–0.03)
TROPONIN I SERPL DL<=0.01 NG/ML-MCNC: 1.48 NG/ML (ref 0–0.03)
TSH SERPL DL<=0.005 MIU/L-ACNC: 1.12 UIU/ML (ref 0.4–4)
URN SPEC COLLECT METH UR: NORMAL
WBC # BLD AUTO: 9.76 K/UL (ref 3.9–12.7)
WBC # BLD AUTO: 9.82 K/UL (ref 3.9–12.7)
WBC # BLD AUTO: 9.86 K/UL (ref 3.9–12.7)

## 2022-11-30 PROCEDURE — 36415 COLL VENOUS BLD VENIPUNCTURE: CPT

## 2022-11-30 PROCEDURE — 85025 COMPLETE CBC W/AUTO DIFF WBC: CPT

## 2022-11-30 PROCEDURE — 81003 URINALYSIS AUTO W/O SCOPE: CPT

## 2022-11-30 PROCEDURE — 99900035 HC TECH TIME PER 15 MIN (STAT)

## 2022-11-30 PROCEDURE — 25000003 PHARM REV CODE 250

## 2022-11-30 PROCEDURE — 99223 1ST HOSP IP/OBS HIGH 75: CPT | Mod: AI,GC,, | Performed by: STUDENT IN AN ORGANIZED HEALTH CARE EDUCATION/TRAINING PROGRAM

## 2022-11-30 PROCEDURE — 27000221 HC OXYGEN, UP TO 24 HOURS

## 2022-11-30 PROCEDURE — 92610 EVALUATE SWALLOWING FUNCTION: CPT

## 2022-11-30 PROCEDURE — 63600175 PHARM REV CODE 636 W HCPCS

## 2022-11-30 PROCEDURE — 93005 ELECTROCARDIOGRAM TRACING: CPT

## 2022-11-30 PROCEDURE — 93010 ELECTROCARDIOGRAM REPORT: CPT | Mod: ,,, | Performed by: INTERNAL MEDICINE

## 2022-11-30 PROCEDURE — 84484 ASSAY OF TROPONIN QUANT: CPT | Mod: 91

## 2022-11-30 PROCEDURE — 85025 COMPLETE CBC W/AUTO DIFF WBC: CPT | Mod: 91

## 2022-11-30 PROCEDURE — 80061 LIPID PANEL: CPT

## 2022-11-30 PROCEDURE — 99223 1ST HOSP IP/OBS HIGH 75: CPT | Mod: 25,,, | Performed by: INTERNAL MEDICINE

## 2022-11-30 PROCEDURE — 25500020 PHARM REV CODE 255: Performed by: STUDENT IN AN ORGANIZED HEALTH CARE EDUCATION/TRAINING PROGRAM

## 2022-11-30 PROCEDURE — 93010 EKG 12-LEAD: ICD-10-PCS | Mod: ,,, | Performed by: INTERNAL MEDICINE

## 2022-11-30 PROCEDURE — 80048 BASIC METABOLIC PNL TOTAL CA: CPT | Mod: XB

## 2022-11-30 PROCEDURE — 99223 PR INITIAL HOSPITAL CARE,LEVL III: ICD-10-PCS | Mod: 25,,, | Performed by: INTERNAL MEDICINE

## 2022-11-30 PROCEDURE — 80053 COMPREHEN METABOLIC PANEL: CPT | Mod: 91

## 2022-11-30 PROCEDURE — 20600001 HC STEP DOWN PRIVATE ROOM

## 2022-11-30 PROCEDURE — 99497 PR ADVNCD CARE PLAN 30 MIN: ICD-10-PCS | Mod: 25,,, | Performed by: STUDENT IN AN ORGANIZED HEALTH CARE EDUCATION/TRAINING PROGRAM

## 2022-11-30 PROCEDURE — 80053 COMPREHEN METABOLIC PANEL: CPT

## 2022-11-30 PROCEDURE — 99497 ADVNCD CARE PLAN 30 MIN: CPT | Mod: 25,,, | Performed by: STUDENT IN AN ORGANIZED HEALTH CARE EDUCATION/TRAINING PROGRAM

## 2022-11-30 PROCEDURE — 25000003 PHARM REV CODE 250: Performed by: STUDENT IN AN ORGANIZED HEALTH CARE EDUCATION/TRAINING PROGRAM

## 2022-11-30 PROCEDURE — 86850 RBC ANTIBODY SCREEN: CPT

## 2022-11-30 PROCEDURE — 94761 N-INVAS EAR/PLS OXIMETRY MLT: CPT

## 2022-11-30 PROCEDURE — 83036 HEMOGLOBIN GLYCOSYLATED A1C: CPT

## 2022-11-30 PROCEDURE — 99223 PR INITIAL HOSPITAL CARE,LEVL III: ICD-10-PCS | Mod: AI,GC,, | Performed by: STUDENT IN AN ORGANIZED HEALTH CARE EDUCATION/TRAINING PROGRAM

## 2022-11-30 PROCEDURE — 36600 WITHDRAWAL OF ARTERIAL BLOOD: CPT

## 2022-11-30 PROCEDURE — 85025 COMPLETE CBC W/AUTO DIFF WBC: CPT | Mod: 91 | Performed by: STUDENT IN AN ORGANIZED HEALTH CARE EDUCATION/TRAINING PROGRAM

## 2022-11-30 PROCEDURE — 84484 ASSAY OF TROPONIN QUANT: CPT

## 2022-11-30 PROCEDURE — 82803 BLOOD GASES ANY COMBINATION: CPT

## 2022-11-30 PROCEDURE — 99223 1ST HOSP IP/OBS HIGH 75: CPT | Mod: GC,,, | Performed by: PSYCHIATRY & NEUROLOGY

## 2022-11-30 PROCEDURE — 83605 ASSAY OF LACTIC ACID: CPT

## 2022-11-30 PROCEDURE — 99223 PR INITIAL HOSPITAL CARE,LEVL III: ICD-10-PCS | Mod: GC,,, | Performed by: PSYCHIATRY & NEUROLOGY

## 2022-11-30 RX ORDER — ENOXAPARIN SODIUM 100 MG/ML
1 INJECTION SUBCUTANEOUS
Status: COMPLETED | OUTPATIENT
Start: 2022-11-30 | End: 2022-12-02

## 2022-11-30 RX ORDER — POLYETHYLENE GLYCOL 3350 17 G/17G
17 POWDER, FOR SOLUTION ORAL DAILY
Status: DISCONTINUED | OUTPATIENT
Start: 2022-11-30 | End: 2022-12-05 | Stop reason: HOSPADM

## 2022-11-30 RX ORDER — DIGOXIN 125 MCG
0.25 TABLET ORAL ONCE
Status: DISCONTINUED | OUTPATIENT
Start: 2022-12-01 | End: 2022-12-01

## 2022-11-30 RX ORDER — DIGOXIN 125 MCG
0.12 TABLET ORAL DAILY
Status: DISCONTINUED | OUTPATIENT
Start: 2022-12-02 | End: 2022-11-30

## 2022-11-30 RX ORDER — TALC
6 POWDER (GRAM) TOPICAL NIGHTLY PRN
Status: CANCELLED | OUTPATIENT
Start: 2022-11-30

## 2022-11-30 RX ORDER — ONDANSETRON 4 MG/1
8 TABLET, ORALLY DISINTEGRATING ORAL EVERY 8 HOURS PRN
Status: DISCONTINUED | OUTPATIENT
Start: 2022-11-30 | End: 2022-12-05 | Stop reason: HOSPADM

## 2022-11-30 RX ORDER — NITROGLYCERIN 0.4 MG/1
0.4 TABLET SUBLINGUAL EVERY 5 MIN PRN
Status: DISCONTINUED | OUTPATIENT
Start: 2022-11-30 | End: 2022-12-05 | Stop reason: HOSPADM

## 2022-11-30 RX ORDER — DIGOXIN 125 MCG
0.12 TABLET ORAL DAILY
Status: DISCONTINUED | OUTPATIENT
Start: 2022-12-02 | End: 2022-12-01

## 2022-11-30 RX ORDER — ENOXAPARIN SODIUM 100 MG/ML
1 INJECTION SUBCUTANEOUS
Status: DISCONTINUED | OUTPATIENT
Start: 2022-11-30 | End: 2022-11-30

## 2022-11-30 RX ORDER — ATORVASTATIN CALCIUM 40 MG/1
80 TABLET, FILM COATED ORAL NIGHTLY
Status: DISCONTINUED | OUTPATIENT
Start: 2022-11-30 | End: 2022-12-05 | Stop reason: HOSPADM

## 2022-11-30 RX ORDER — FUROSEMIDE 10 MG/ML
40 INJECTION INTRAMUSCULAR; INTRAVENOUS DAILY
Status: DISCONTINUED | OUTPATIENT
Start: 2022-11-30 | End: 2022-12-05

## 2022-11-30 RX ORDER — SODIUM CHLORIDE 0.9 % (FLUSH) 0.9 %
10 SYRINGE (ML) INJECTION
Status: DISCONTINUED | OUTPATIENT
Start: 2022-11-30 | End: 2022-12-05 | Stop reason: HOSPADM

## 2022-11-30 RX ORDER — AMOXICILLIN 250 MG
1 CAPSULE ORAL 2 TIMES DAILY
Status: DISCONTINUED | OUTPATIENT
Start: 2022-11-30 | End: 2022-12-05 | Stop reason: HOSPADM

## 2022-11-30 RX ORDER — POTASSIUM CHLORIDE 750 MG/1
30 CAPSULE, EXTENDED RELEASE ORAL
Status: DISCONTINUED | OUTPATIENT
Start: 2022-11-30 | End: 2022-11-30

## 2022-11-30 RX ORDER — SODIUM CHLORIDE 0.9 % (FLUSH) 0.9 %
10 SYRINGE (ML) INJECTION
Status: CANCELLED | OUTPATIENT
Start: 2022-11-30

## 2022-11-30 RX ORDER — ASPIRIN 325 MG
325 TABLET ORAL ONCE
Status: COMPLETED | OUTPATIENT
Start: 2022-11-30 | End: 2022-11-30

## 2022-11-30 RX ORDER — TALC
6 POWDER (GRAM) TOPICAL NIGHTLY PRN
Status: DISCONTINUED | OUTPATIENT
Start: 2022-11-30 | End: 2022-12-02

## 2022-11-30 RX ORDER — LOSARTAN POTASSIUM 25 MG/1
25 TABLET ORAL DAILY
Status: DISCONTINUED | OUTPATIENT
Start: 2022-11-30 | End: 2022-12-02

## 2022-11-30 RX ORDER — ACETAMINOPHEN 325 MG/1
650 TABLET ORAL EVERY 4 HOURS PRN
Status: DISCONTINUED | OUTPATIENT
Start: 2022-11-30 | End: 2022-12-05 | Stop reason: HOSPADM

## 2022-11-30 RX ORDER — ASPIRIN 81 MG/1
81 TABLET ORAL DAILY
Status: DISCONTINUED | OUTPATIENT
Start: 2022-12-01 | End: 2022-12-05 | Stop reason: HOSPADM

## 2022-11-30 RX ORDER — DIGOXIN 125 MCG
0.5 TABLET ORAL ONCE
Status: DISCONTINUED | OUTPATIENT
Start: 2022-12-01 | End: 2022-11-30

## 2022-11-30 RX ORDER — DIGOXIN 125 MCG
0.25 TABLET ORAL ONCE
Status: DISCONTINUED | OUTPATIENT
Start: 2022-12-01 | End: 2022-11-30

## 2022-11-30 RX ORDER — DIGOXIN 125 MCG
0.5 TABLET ORAL ONCE
Status: COMPLETED | OUTPATIENT
Start: 2022-11-30 | End: 2022-11-30

## 2022-11-30 RX ADMIN — Medication 6 MG: at 11:11

## 2022-11-30 RX ADMIN — TICAGRELOR 90 MG: 90 TABLET ORAL at 08:11

## 2022-11-30 RX ADMIN — ENOXAPARIN SODIUM 60 MG: 100 INJECTION SUBCUTANEOUS at 05:11

## 2022-11-30 RX ADMIN — TICAGRELOR 180 MG: 90 TABLET ORAL at 04:11

## 2022-11-30 RX ADMIN — ENOXAPARIN SODIUM 60 MG: 100 INJECTION SUBCUTANEOUS at 03:11

## 2022-11-30 RX ADMIN — IOHEXOL 75 ML: 350 INJECTION, SOLUTION INTRAVENOUS at 03:11

## 2022-11-30 RX ADMIN — ACETAMINOPHEN 650 MG: 325 TABLET ORAL at 08:11

## 2022-11-30 RX ADMIN — FUROSEMIDE 40 MG: 10 INJECTION, SOLUTION INTRAVENOUS at 03:11

## 2022-11-30 RX ADMIN — LOSARTAN POTASSIUM 25 MG: 25 TABLET, FILM COATED ORAL at 12:11

## 2022-11-30 RX ADMIN — ASPIRIN 325 MG: 325 TABLET ORAL at 03:11

## 2022-11-30 RX ADMIN — POTASSIUM BICARBONATE 25 MEQ: 978 TABLET, EFFERVESCENT ORAL at 07:11

## 2022-11-30 RX ADMIN — ATORVASTATIN CALCIUM 80 MG: 40 TABLET, FILM COATED ORAL at 08:11

## 2022-11-30 RX ADMIN — SENNOSIDES AND DOCUSATE SODIUM 1 TABLET: 50; 8.6 TABLET ORAL at 08:11

## 2022-11-30 RX ADMIN — DIGOXIN 0.5 MG: 125 TABLET ORAL at 11:11

## 2022-11-30 RX ADMIN — SENNOSIDES AND DOCUSATE SODIUM 1 TABLET: 50; 8.6 TABLET ORAL at 12:11

## 2022-11-30 RX ADMIN — POTASSIUM BICARBONATE 25 MEQ: 978 TABLET, EFFERVESCENT ORAL at 08:11

## 2022-11-30 NOTE — PLAN OF CARE
[facility] - Cardiology Stepdown  Initial Discharge Assessment       Primary Care Provider: Renaldo Molina MD    Admission Diagnosis: CHF (congestive heart failure) [I50.9]  Troponin level elevated [R77.8]    Admission Date: 11/29/2022  Expected Discharge Date:     Discharge Barriers Identified: None    Payor: MEDICARE / Plan: MEDICARE PART A & B / Product Type: Government /     Extended Emergency Contact Information  Primary Emergency Contact: Riana Bishop  Mobile Phone: [phone]  Relation: Daughter  Secondary Emergency Contact: Fern Bishop  Address: [address] [place] [address] [id number] [place] [address]  Home Phone: [phone]  Relation: Spouse    Discharge Plan A: Rehab  Discharge Plan B: Skilled Nursing Facility      [id number] - [place], [place] - [address]  [address]  [facility] [address]  Phone: [phone] Fax: [phone]    [id number] - [place] - [address] AT [facility] [address]  [address] [place] [address]  Phone: [phone] Fax: [phone]    [id number] - [place], [place] - [address]  [address]  [facility] [address]  Phone: [phone] Fax: [phone]      Initial Assessment (most recent)       Adult Discharge Assessment - 11/30/22 1201          Discharge Assessment    Assessment Type Discharge Planning Assessment     Confirmed/corrected address, phone number and insurance Yes     Confirmed Demographics Correct on Facesheet     Source of Information family     If unable to respond/provide information was family/caregiver contacted? Yes     Contact Name/Number Milena Ureña, [phone]     When was your last doctors appointment? 11/21/22     Lives With spouse;child(wesly), adult     Do you expect to return to your current living situation? Yes   Pt. was at [facility]    Do you  have help at home or someone to help you manage your care at home? Yes     Who are your caregiver(s) and their phone number(s)? Milena Ureña, [phone]     Prior to hospitilization cognitive status: Not Oriented to Place     Current cognitive status: Not Oriented to Place     Walking or Climbing Stairs Difficulty ambulation difficulty, requires equipment     Dressing/Bathing Difficulty bathing difficulty, assistance 1 person     Home Layout Able to live on 1st floor     Equipment Currently Used at Home walker, standard     Patient currently being followed by outpatient case management? No     Do you currently have service(s) that help you manage your care at home? Yes     Name and Contact number of agency Currently, patient resides at [facility].     Is the pt/caregiver preference to resume services with current agency Yes     Do you take prescription medications? Yes     Do you have prescription coverage? Yes     Do you have any problems affording any of your prescribed medications? No     Is the patient taking medications as prescribed? yes     Who is going to help you get home at discharge? Milena Ureña, [phone]     How do you get to doctors appointments? family or friend will provide     Are you on dialysis? No     Do you take coumadin? No     Discharge Plan A Rehab     Discharge Plan B Skilled Nursing Facility     Discharge Plan discussed with: Adult children;Patient;Spouse/sig other     Name(s) and Number(s) Milena Ureña, [phone]     Discharge Barriers Identified None                 The CM met with the patient, wife, and daughter at bedside to complete the DPA.  The CM placed name and contact information on the blackboard in the patient's room.  Use preferred pharmacy / bedside delivery for any necessary  medications at the time of discharge.The patient is independent with all ADLs.  The patient uses the following home equipment. Walker. The patient is not on  Dialysis or Coumadin. The patient's daughter will provide assistance to the patient upon discharge. The patient's daughter will provide transportation upon discharge . The CM will continue to follow for course of hospitalization.

## 2022-11-30 NOTE — CONSULTS
[facility]  Comprehensive Stroke Center  Consult Note    Consults  Assessment/Plan:     Patient is a 82 y.o. year old male with:    Dysarthria and anarthria  New onset dysarthria in setting of patient with ACS.  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  This is likely a recrudescence versus hypoperfusion event resulting in reemergence of his old previous symptoms.  The patient is currently receiving medical maximal therapy at this time.  There would be no change to his current medical management.  Regarding management for TNK, the patient is on therapeutic lovenox, had a recent stroke on Saturday as a result would not be a candidate.  He is not a thrombectomy candidate at this time as well as not a large vessel occlusion.  Given the exam history and findings this is like a recrudescence verus possible new event.      - can consider MRI without contrast if no improvement and or changes to exam  - no further work up and or management  - continue DAPT from Neurology perspective  - recommend therapies    STROKE DOCUMENTATION   Acute Stroke Times   Last Known Normal Date: 11/30/22  Last Known Normal Time: 1200  Symptom Onset Date: 11/30/22  Symptom Onset Time: 1430  Stroke Team Called Date: 11/30/22  Stroke Team Called Time: 1438  Stroke Team Arrival Date: 11/30/22  Stroke Team Arrival Time: 1441  CT Interpretation Time: 1515  Thrombolytic Therapy Recommended: No  Thrombectomy Recommended: No  MRI Acute Stroke Protocol Interpretation Time:  (NA)    NIH Scale:  Interval: baseline  1a. Level of Consciousness: 2-->Not alert, requires repeated stimulation to attend, or is obtunded and requires strong or painful stimulation to make movements (not stereotyped)  1b. LOC Questions: 2-->Answers neither question correctly  1c. LOC Commands: 0-->Performs both tasks correctly  2. Best Gaze: 0-->Normal  3. Visual: 0-->No visual loss  4. Facial Palsy:  2-->Partial paralysis (total or near-total paralysis of lower face)  5a. Motor Arm, Left: 1-->Drift, limb holds 90 (or 45) degrees, but drifts down before full 10 seconds, does not hit bed or other support  5b. Motor Arm, Right: 1-->Drift, limb holds 90 (or 45) degrees, but drifts down before full 10 secs, does not hit bed or other support  6a. Motor Leg, Left: 0-->No drift, leg holds 30 degree position for full 5 secs  6b. Motor Leg, Right: 2-->Some effort against gravity, leg falls to bed by 5 secs, but has some effort against gravity  7. Limb Ataxia: 2-->Present in two limbs  8. Sensory: 0-->Normal, no sensory loss  9. Best Language: 2-->Severe aphasia, all communication is through fragmentary expression, great need for inference, questioning, and guessing by the listener. Range of information that can be exchanged is limited, listener carries burden of. . . (see row details)  10. Dysarthria: 2-->Severe dysarthria, patients speech is so slurred as to be unintelligible in the absence of or out of proportion to any dysphasia, or is mute/anarthric  11. Extinction and Inattention (formerly Neglect): 0-->No abnormality  Total (NIH Stroke Scale): 16    Modified [place] Score: 3  [place] Coma Scale:11   ABCD2 Score:    [id number] Score:   HAS -BLED Score:   ICH Score:   Hunt & Baca Classification:     Thrombolysis Candidate? No, LMWH within the previous 24 hours (treatment doses), Recent mild stroke ( <3 months) (mild stroke = NIHSS <5 and 1/3 or territory) , Strong suspicion for stroke mimic or alternative diagnosis     Delays to Thrombolysis?  Not Applicable    Interventional Revascularization Candidate?   Is the patient eligible for mechanical endovascular reperfusion (JOEL)?  No; No large vessel occlusion identified on imaging     Delays to Thrombectomy? Not Applicable    Hemorrhagic change of an Ischemic Stroke: Does this patient have an ischemic stroke with hemorrhagic changes? No     Subjective:     History of  Present Illness:  82 year old male patient was woken up by family at around 3pm and noticed that he had new right facial droop and speech changes.  Nursing was immediately notified and was stroke activated.  Family states the patient went to sleep around noon and was woken up by family when the food arrived.  They noticed he was slow to wake, and wouldn't respond appropriately.  They then noticed the right facial weakness and incomprehensible speech.    Patient is currently being admitted and worked up for non-ST segment elevation myocardial infarction.  He is currently on aspirin, brilinta and therapeutic lovenox.     Patient has a past medical history of hypertension, hyperlipidemia, congestive heart failure (currently at 15%), and multiple previous strokes with known baseline right sided weakness and deficits.      On assessment patient was confused, poorly responsive, right facial, speech garbled, and right sided weakness.  CT head was ordered and CT angiogram was ordered.  Patient currently on medical maximal therapy, and has no new acute intracranial findings and no large vessel occlusion.  This is likely a recrudescence on known old strokes in the setting of ACS and or hypoperfusion.          Past Medical History:   Diagnosis Date    Hypertension     Stroke      No past surgical history on file.  Family History   Problem Relation Age of Onset    Hypertension Mother     Alcohol abuse Father     No Known Problems Sister     No Known Problems Daughter     No Known Problems Son     Hypertension Paternal Aunt     Hypertension Paternal Grandmother     Hypertension Paternal Grandfather      Social History     Tobacco Use    Smoking status: Never    Smokeless tobacco: Never   Substance Use Topics    Alcohol use: Not Currently     Comment: no alcohol for 6 months    Drug use: Never     Review of patient's allergies indicates:  No Known Allergies    Medications: I have reviewed the current medication  administration record.    Medications Prior to Admission   Medication Sig Dispense Refill Last Dose    atorvastatin (LIPITOR) 80 MG tablet Take 1 tablet (80 mg total) by mouth every evening. 90 tablet 3     losartan (COZAAR) 25 MG tablet Take 1 tablet (25 mg total) by mouth once daily. 90 tablet 3     acetaminophen (TYLENOL) 325 MG tablet Take 650 mg by mouth every 8 (eight) hours as needed.       aspirin (ECOTRIN) 81 MG EC tablet Take 1 tablet (81 mg total) by mouth once daily. 30 tablet 2     cyanocobalamin (VITAMIN B-12) 1000 MCG tablet Take 1 tablet (1,000 mcg total) by mouth once daily. 30 tablet 0     furosemide (LASIX) 20 MG tablet Take 1 tablet (20 mg total) by mouth once daily. 60 tablet 1      Review of Systems   Constitutional:  Negative for fatigue and fever.   HENT:  Positive for trouble swallowing and voice change. Negative for hearing loss and tinnitus.    Eyes:  Negative for visual disturbance.   Respiratory:  Negative for cough and shortness of breath.    Cardiovascular:         Could not determine   Gastrointestinal:  Negative for abdominal pain, nausea and vomiting.   Endocrine: Negative.    Genitourinary: Negative.    Musculoskeletal: Negative.    Skin:  Negative for rash.   Neurological:  Positive for facial asymmetry, speech difficulty and weakness. Negative for tremors, light-headedness, numbness and headaches.   Psychiatric/Behavioral:  Positive for confusion and decreased concentration.    Objective:     Vital Signs (Most Recent):  Temp: 97.6 °F (36.4 °C) (11/30/22 1117)  Pulse: 87 (11/30/22 1440)  Resp: 18 (11/30/22 1522)  BP: (!) 149/89 (11/30/22 1440)  SpO2: 99 % (11/30/22 1522)    Vital Signs Range (Last 24H):  Temp:  [96.5 °F (35.8 °C)-98.6 °F (37 °C)]   Pulse:  [62-99]   Resp:  [16-20]   BP: (128-149)/()   SpO2:  [92 %-100 %]     Physical Exam  Vitals and nursing note reviewed.   Constitutional:       General: He is in acute distress.      Appearance: Normal appearance. He  is ill-appearing.   HENT:      Head: Normocephalic and atraumatic.      Nose: Nose normal.      Mouth/Throat:      Mouth: Mucous membranes are dry.      Pharynx: Oropharynx is clear.   Eyes:      General: No visual field deficit.     Extraocular Movements: Extraocular movements intact.      Pupils: Pupils are equal, round, and reactive to light.   Cardiovascular:      Rate and Rhythm: Normal rate and regular rhythm.      Pulses: Normal pulses.   Pulmonary:      Effort: Pulmonary effort is normal.   Abdominal:      General: There is no distension.      Palpations: Abdomen is soft.      Tenderness: There is no guarding.   Musculoskeletal:         General: Normal range of motion.      Cervical back: Normal range of motion.   Skin:     General: Skin is warm and dry.   Neurological:      Mental Status: He is lethargic, disoriented and confused.      GCS: GCS eye subscore is 3. GCS verbal subscore is 2. GCS motor subscore is 6.      Cranial Nerves: Cranial nerve deficit, dysarthria and facial asymmetry present.      Motor: Weakness and pronator drift present.      Coordination: Coordination abnormal. Finger-Nose-Finger Test abnormal and Heel to Geronimo Test abnormal.      Comments: Patient has bilateral upper extremity drift that was stuttering, consistent with negative myoclonus     Neurological Exam:   LOC: drowsy and responsive to voice  Attention Span: poor  Language: Expressive aphasia, Receptive aphasia, Naming impaired  Articulation: Dysarthria  Orientation: Not oriented to person, place, and time  Visual Fields: Full  EOM (CN III, IV, VI): Full/intact  Pupils (CN II, III): PERRL  Facial Sensation (CN V): Normal  Facial Movement (CN VII): Lower facial weakness on the Right  Reflexes: did not test  Motor: 5/5 to the left, 4/5 on the right (baseline), bilateral upper extremity drift  Cerebellum: Axia/Trunk Instability/Ataxia   Sensation: Intact to light touch, temperature and vibration  Tone: Normal tone  throughout    Laboratory:  CMP:   Recent Labs   Lab 11/30/22  0415   CALCIUM 9.0   ALBUMIN 3.3*   PROT 7.0      K 3.5   CO2 30*      BUN 27*   CREATININE 1.0   ALKPHOS 73   ALT 23   AST 32   BILITOT 1.0     CBC:   Recent Labs   Lab 11/30/22  1521   WBC 9.86   RBC 4.98   HGB 14.8   HCT 47.2      MCV 95   MCH 29.7   MCHC 31.4*     Lipid Panel:   Recent Labs   Lab 11/30/22  0415   CHOL 98*   LDLCALC 48.4*   HDL 41   TRIG 43     Coagulation: No results for input(s): PT, INR, APTT in the last 168 hours.  Hgb A1C:   Recent Labs   Lab 11/30/22  0415   HGBA1C 5.1     TSH:   Recent Labs   Lab 11/29/22  2328   TSH 1.123     Brain and vessel imaging:  Brain:   Mild blood products are associated with a recent small left occipital deep white matter infarct much of which may be petechial in nature however continued follow-up is recommended (the mild blood products are new from the CT study dated 11/26/2022).  No mass effect or new territorial infarct.   No significant stenosis at the carotid bifurcations by NASCET criteria.  The vertebral arteries are patent without advanced stenosis.   Intracranially there is moderate to severe stenosis of the right cavernous/supraclinoid segment (similar to 2021) with mild narrowing on the left.  No major branch stenosis/occlusion is identified at the [id number].   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    Cardiac Evaluation:    The left ventricle is mildly enlarged with concentric hypertrophy and severely decreased systolic function.   Severe left atrial enlargement.   Grade III left ventricular diastolic dysfunction.   There is no evidence of intracardiac shunting.   Mild right ventricular enlargement with mildly reduced right ventricular systolic function.   Right atrial enlargement.   Mild-to-moderate mitral regurgitation.   Mild tricuspid regurgitation.   There is moderate pulmonary hypertension.    Jose Apple  MD  Com[facility] Stroke Center  Department of Vascular Neurology   [facility] - Cardiology Stepdown

## 2022-11-30 NOTE — H&P
[facility] - Emergency Dept  [facility]  History & Physical    Patient Name: Gregorio Bishop  MRN: [id number]  Patient Class: IP- Inpatient  Admission Date: 11/29/2022  Attending Physician: Tristen Amanda MD   Primary Care Provider: Renaldo Molina MD         Patient information was obtained from patient, past medical records and ER records.     Subjective:     Principal Problem:NSTEMI (non-ST elevated myocardial infarction)    Chief Complaint:   Chief Complaint   Patient presents with    Abnormal Lab     From [facility] where is stays  for debility. Sent for cardiology evaluation after elevated trops, Trop #1 0.389, Trop #2 0.901, given 324 ASA and 2 sprays Nitro, intermittent Cp for a few days        HPI: 81 yo M PMHx CHF, CVA with RLE deficits [facility] due to 2 week history of progressive SHORE and associated chest pressure in the substernal region without radiation.  Patient denies any overt feeling of pain, instead feeling chest fullness and chest pressure, mostly when attempting to ambulate at the rehabilitation center.  Patient has also been describing increased dyspnea both on exertion and at rest.  Has difficulty feeling like he gets a full breath in.  Denies abdominal pain, jaw pain, arm pain, nausea, vomiting, diarrhea, fever, chills, cough, sick contacts.  Patient is unsure of what medications he takes.  Patient describes his diet as whatever he feels like eating.  Denies smoking history, reason alcohol use, illicit drugs.  Patient was previously admitted for treatment of congestive heart failure 11/26.    Patient admitted to Hospital Medicine for further evaluation of elevated troponin, shortness of breath without hypoxemia, concerns for NSTEMI.      Past Medical History:   Diagnosis Date    Hypertension     Stroke        No past surgical history on file.    Review of patient's allergies indicates:  No Known Allergies    No current facility-administered medications  on file prior to encounter.     Current Outpatient Medications on File Prior to Encounter   Medication Sig    acetaminophen (TYLENOL) 325 MG tablet Take 650 mg by mouth every 8 (eight) hours as needed.    aspirin (ECOTRIN) 81 MG EC tablet Take 1 tablet (81 mg total) by mouth once daily.    atorvastatin (LIPITOR) 80 MG tablet Take 1 tablet (80 mg total) by mouth every evening.    cyanocobalamin (VITAMIN B-12) 1000 MCG tablet Take 1 tablet (1,000 mcg total) by mouth once daily.    furosemide (LASIX) 20 MG tablet Take 1 tablet (20 mg total) by mouth once daily.    losartan (COZAAR) 25 MG tablet Take 1 tablet (25 mg total) by mouth once daily.     Family History       Problem Relation (Age of Onset)    Alcohol abuse Father    Hypertension Mother, Paternal Aunt, Paternal Grandmother, Paternal Grandfather    No Known Problems Sister, Daughter, Son          Tobacco Use    Smoking status: Never    Smokeless tobacco: Never   Substance and Sexual Activity    Alcohol use: Not Currently     Comment: no alcohol for 6 months    Drug use: Never    Sexual activity: Not Currently     Review of Systems   Constitutional:  Positive for fatigue. Negative for chills, diaphoresis and fever.   HENT:  Negative for rhinorrhea, sneezing, sore throat and trouble swallowing.    Respiratory:  Positive for chest tightness and shortness of breath. Negative for cough.    Cardiovascular:  Positive for chest pain (pressure). Negative for palpitations.   Gastrointestinal:  Positive for constipation. Negative for diarrhea, nausea and vomiting.   Genitourinary:  Negative for dysuria, frequency and urgency.   Musculoskeletal:  Negative for arthralgias and myalgias.   Skin:  Negative for rash and wound.   Neurological:  Positive for weakness. Negative for headaches.   Psychiatric/Behavioral:  Negative for agitation and confusion.    Objective:     Vital Signs (Most Recent):  Temp: 98 °F (36.7 °C) (11/30/22 0045)  Pulse: 84 (11/30/22 0257)  Resp: 18  (11/30/22 0257)  BP: 128/82 (11/30/22 0230)  SpO2: 98 % (11/30/22 0257) Vital Signs (24h Range):  Temp:  [98 °F (36.7 °C)-98.6 °F (37 °C)] 98 °F (36.7 °C)  Pulse:  [77-84] 84  Resp:  [16-18] 18  SpO2:  [97 %-98 %] 98 %  BP: (128-133)/(82-89) 128/82     Weight: 59 kg (130 lb)  Body mass index is 22.31 kg/m².    Physical Exam  Constitutional:       Appearance: Normal appearance.   HENT:      Head: Normocephalic.   Eyes:      Extraocular Movements: Extraocular movements intact.      Pupils: Pupils are equal, round, and reactive to light.   Neck:      Comments: JVD+  Cardiovascular:      Rate and Rhythm: Normal rate and regular rhythm.      Pulses: Normal pulses.      Heart sounds: Normal heart sounds. No murmur heard.    No friction rub. No gallop.   Pulmonary:      Effort: Pulmonary effort is normal. No respiratory distress.      Breath sounds: Rales present.   Chest:      Chest wall: No tenderness.   Abdominal:      General: Abdomen is flat.      Palpations: Abdomen is soft.      Tenderness: There is no abdominal tenderness. There is no guarding or rebound.   Musculoskeletal:      Cervical back: Normal range of motion and neck supple.      Right lower leg: No edema.      Left lower leg: No edema.   Skin:     General: Skin is warm and dry.   Neurological:      General: No focal deficit present.      Mental Status: He is alert and oriented to person, place, and time.         CRANIAL NERVES     CN III, IV, VI   Pupils are equal, round, and reactive to light.     Significant Labs: All pertinent labs within the past 24 hours have been reviewed.    Significant Imaging: I have reviewed all pertinent imaging results/findings within the past 24 hours.    Assessment/Plan:     * NSTEMI (non-ST elevated myocardial infarction)  Symptoms: Presented with substernal, non-radiating chest pain, most associated with exertion. Also with associated dyspnea on exertion (and now at rest). No NTG was attempted.  Risk factors: Likely  previous atherosclerotic disease (h/o CVA).   Diagnostics: EKG showed sinus rhythm with PVCs, non-specific T wave abnormalities; no over ischemic changes  Troponin uptrending prior to admission: 0.065 > 0.087 > 1.321, BNP 2574  BEBE 3 or 4  A/P: Unclear if new ischemic event is causing further CHF decompensation or if CHF decompensation d/t suboptimal medication regimen, diet noncompliance etc causing NSTEMI d/t demand mismatch.      -- DAPT load (ASA, Brillinta) and maintenance thereafter  -- Full AC with Lovenox (concerns for volume overload, so will defer heparin gtt d/t additional volume)  -- Trend trop to peak  -- Statin: atorvastatin 80 qd  -- B-blocker: n/a, start on discharge  -- ACEi/ARB: losartan 25  -- NTG PRN for chest pain  -- EKG STAT for new chest pain or hemodynamic instability  -- Inpatient consult to cardiology      Acute decompensated heart failure  Symptoms: 2-3 weeks of progressive SHORE, dyspnea at rest, orthopnea. Previously admitted 11/26 for ADHF, treated with diuretics.  TTE (11/2022) EF 15%, G3LVDD, decreased from study 09/2022 25%, G1LVDD.  Home meds: losartan, furosemide 20 qd  Dry weight: Unknown  Diagnostics: CXR: pulmonary edema; BNP 2574; Lactate 1.2; O2 requirement RA SpO2 high 90s    Initial etiology: Possibly ischemic cardiomyopathy s/o prev CVA and athersclerotic disease. Patient was not told he had CHF before, family at bedside has not heart CHF diagnosis before either.  Decompensation etiology: Unclear if new ischemic event is causing further CHF decompensation or if CHF decompensation d/t suboptimal medication regimen, diet noncompliance etc causing NSTEMI d/t demand mismatch.      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 No signs of new CVA. Per family, at new baseline functionality.    -- Fall precaution  -- Aspiration precaution  -- Rest per NSTEMI A/P      VTE Risk Mitigation (From admission, onward)           Ordered     enoxaparin injection 60 mg  Every 12 hours (non-standard times)         11/30/22 0256     Place sequential compression device  Until discontinued         11/30/22 0256                       Zi-Angelo Sanchez MD  Department of Hospital Medicine   [facility] - Emergency Dept

## 2022-11-30 NOTE — PLAN OF CARE
Problem: SLP  Goal: SLP Goal  Description: Speech-Language Pathology Goals  Goals to be met by 12/7/22  1. Patient will participate in ongoing swallow assessment in order to determine safest least restrictive diet.   2. Patient will participate in a speech/language/cog eval.   Outcome: Ongoing, Progressing     Patient seen for a bedside swallow eval. SLP recommending a Mechanical Soft Diet (Level 5)/Thin Liquids at this time. ST to continue to follow.     Henry Albright CCC-SLP  Speech-Language Pathology  Pager: [phone]

## 2022-11-30 NOTE — PT/OT/SLP EVAL
Speech Language Pathology Evaluation  Bedside Swallow    Patient Name:  Gregorio Bishop   MRN:  [id number]  Admitting Diagnosis: NSTEMI (non-ST elevated myocardial infarction)    Recommendations:                 General Recommendations:  Dysphagia therapy, Speech language evaluation, and Cognitive-linguistic evaluation  Diet recommendations:  Mechanical soft, Thin   Aspiration Precautions: 1 bite/sip at a time, Assistance with meals, Eliminate distractions, Feed only when awake/alert, HOB to 90 degrees, Small bites/sips, and Strict aspiration precautions   General Precautions: Standard, aspiration, fall  Communication strategies:  provide increased time to answer and go to room if call light pushed    History:     Past Medical History:   Diagnosis Date    Hypertension     Stroke        No past surgical history on file.    Prior Intubation HX:  None this admission    Modified Barium Swallow: None on file    Chest X-Rays 11/29/22: There is nonspecific elevation of the left hemidiaphragm.  The lungs are otherwise well expanded.  There are mild to moderate interstitial and hazy airspace opacities, with slight improvement from prior.  No new focal consolidation.  The pleural spaces are clear.     Prior diet: soft solids/thin    Subjective     Patient awake and alert  Communicated with nurse prior to session     Pain/Comfort:  Pain Rating 1: 0/10  Pain Rating Post-Intervention 1: 0/10    Respiratory Status: Room air    Objective:     Oral Musculature Evaluation  Oral Musculature: general weakness  Dentition: scattered dentition, rarely or never uses dentures to eat  Secretion Management: adequate  Mucosal Quality: good  Mandibular Strength and Mobility: WFL  Volitional Cough: adequate  Volitional Swallow: timely; adequate laryngeal elevation  Voice Prior to PO Intake: clear; reduced intensity    Bedside Swallow Eval:   Consistencies Assessed:  Thin liquids x6   Puree x3  Solids x2      Oral Phase:   Prolonged mastication of  solids with adequate oral clearance    Pharyngeal Phase:   no overt clinical signs/symptoms of aspiration  no overt clinical signs/symptoms of pharyngeal dysphagia    Compensatory Strategies  None    Treatment: Patient sitting up in bed with multiple family members present. They reported continued speech/language/cog deficits since prior admit at [facility]. SLP educated patient and family regarding recs. Patient left in bed with call light within reach. Recs communicated to team.    Assessment:     Gregorio Bishop is a 82 y.o. male with an SLP diagnosis of Dysphagia. ST to continue to follow.     Goals:   Multidisciplinary Problems       SLP Goals          Problem: SLP    Goal Priority Disciplines Outcome   SLP Goal     SLP Ongoing, Progressing   Description: Speech-Language Pathology Goals  Goals to be met by 12/7/22  1. Patient will participate in ongoing swallow assessment in order to determine safest least restrictive diet.   2. Patient will participate in a speech/language/cog eval.                          Plan:     Patient to be seen:  4 x/week   Plan of Care expires:  12/30/22  Plan of Care reviewed with:  patient, family   SLP Follow-Up:  Yes       Discharge recommendations:  rehabilitation facility   Barriers to Discharge:  None    Time Tracking:     SLP Treatment Date:   11/30/22  Speech Start Time:  0949  Speech Stop Time:  1001     Speech Total Time (min):  12 min    Billable Minutes: Eval Swallow and Oral Function 12    Henry Albright CCC-SLP  Speech-Language Pathology  Pager: [phone]   11/30/2022

## 2022-11-30 NOTE — ASSESSMENT & PLAN NOTE
New onset dysarthria in setting of patient with ACS.  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  This is likely a recrudescence versus hypoperfusion event resulting in reemergence of his old previous symptoms.  The patient is currently receiving medical maximal therapy at this time.  There would be no change to his current medical management.  Regarding management for TNK, the patient is on therapeutic lovenox, had a recent stroke on Saturday as a result would not be a candidate.  He is not a thrombectomy candidate at this time as well as not a large vessel occlusion.  Given the exam history and findings this is like a recrudescence verus possible new event.      - can consider MRI without contrast if no improvement and or changes to exam  - no further work up and or management  - continue DAPT from Neurology perspective  - recommend therapies

## 2022-11-30 NOTE — ED PROVIDER NOTES
Encounter Date: 11/29/2022       History     Chief Complaint   Patient presents with    Abnormal Lab     From [facility] where is stays  for debility. 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  The patient states that he has not had any chest pain and recalls that he recently had a stroke and that was why he was sent to the rehab facility.  I contacted the rehab facility who says that they were checking labs because the patient seemed increasingly confused today and has not slept for the past 4 days.  The patient denies any acute pain but can not contribute further to this history.    The history is provided by the patient and the nursing home. No  was used.   Review of patient's allergies indicates:  No Known Allergies  Past Medical History:   Diagnosis Date    Hypertension     Stroke      No past surgical history on file.  Family History   Problem Relation Age of Onset    Hypertension Mother     Alcohol abuse Father     No Known Problems Sister     No Known Problems Daughter     No Known Problems Son     Hypertension Paternal Aunt     Hypertension Paternal Grandmother     Hypertension Paternal Grandfather      Social History     Tobacco Use    Smoking status: Never    Smokeless tobacco: Never   Substance Use Topics    Alcohol use: Not Currently     Comment: no alcohol for 6 months    Drug use: Never     Review of Systems   Constitutional:  Negative for fever.   HENT:  Negative for ear pain and sore throat.    Eyes:  Negative for visual disturbance.   Respiratory:  Negative for shortness of breath.    Cardiovascular:  Negative for chest pain and leg swelling.   Gastrointestinal:  Negative for abdominal pain, nausea and vomiting.    Genitourinary:  Negative for dysuria and flank pain.   Musculoskeletal:  Negative for back pain, gait problem, neck pain and neck stiffness.   Skin:  Negative for rash.   Neurological:  Positive for weakness. Negative for dizziness, syncope and numbness.   Psychiatric/Behavioral:  Negative for confusion and dysphoric mood.      Physical Exam     Initial Vitals [11/29/22 2233]   BP Pulse Resp Temp SpO2   133/86 80 18 98.6 °F (37 °C) 98 %      MAP       --         Physical Exam    Nursing note and vitals reviewed.  Constitutional: He appears well-developed. He appears cachectic.  Non-toxic appearance. He has a sickly appearance.   HENT:   Head: Normocephalic and atraumatic.   Eyes: EOM are normal. Pupils are equal, round, and reactive to light.   Neck: Neck supple.   Normal range of motion.  Cardiovascular:  Normal rate, regular rhythm, S1 normal, normal heart sounds, intact distal pulses and normal pulses.           Pulmonary/Chest: Breath sounds normal. He has no wheezes. He has no rhonchi. He has no rales.   Abdominal: Abdomen is soft. Bowel sounds are normal. There is no abdominal tenderness. There is no rebound and no guarding.   Musculoskeletal:         General: No tenderness or edema. Normal range of motion.      Cervical back: Normal range of motion and neck supple.     Neurological: He is alert and oriented to person, place, and time. GCS score is 15. GCS eye subscore is 4. GCS verbal subscore is 5. GCS motor subscore is 6.   The patient is oriented to self, but disoriented to time, however he is aware that he is at the hospital and knows that he was sent to the rehab facility because of a recent stroke.   Skin: Skin is warm, dry and intact. Capillary refill takes less than 2 seconds. No rash noted. No erythema. No pallor.   Psychiatric: He has a normal mood and affect. His speech is normal and behavior is normal. Judgment and thought content normal. He exhibits abnormal recent memory and abnormal remote  memory.       ED Course   Procedures  Labs Reviewed   CBC W/ AUTO DIFFERENTIAL - Abnormal; Notable for the following components:       Result Value    RBC 4.20 (*)     Hemoglobin 12.7 (*)     Hematocrit 39.5 (*)     Lymph % 17.3 (*)     All other components within normal limits    Narrative:     Release to patient->Immediate   COMPREHENSIVE METABOLIC PANEL - Abnormal; Notable for the following components:    Glucose 118 (*)     BUN 30 (*)     Albumin 3.1 (*)     All other components within normal limits    Narrative:     Release to patient->Immediate   B-TYPE NATRIURETIC PEPTIDE - Abnormal; Notable for the following components:    BNP 2,574 (*)     All other components within normal limits    Narrative:     Release to patient->Immediate   TROPONIN I - Abnormal; Notable for the following components:    Troponin I 1.321 (*)     All other components within normal limits    Narrative:     add on tsh,mag,and troponin per dr nola gasca/order#[id number] @0:25 11/30/2022          Release to patient->Immediate   LIPID PANEL - Abnormal; Notable for the following components:    Cholesterol 98 (*)     LDL Cholesterol 48.4 (*)     All other components within normal limits   CBC W/ AUTO DIFFERENTIAL - Abnormal; Notable for the following components:    RBC 4.53 (*)     Hemoglobin 13.8 (*)     All other components within normal limits   TROPONIN I - Abnormal; Notable for the following components:    Troponin I 1.479 (*)     All other components within normal limits   TROPONIN I - Abnormal; Notable for the following components:    Troponin I 1.479 (*)     All other components within normal limits   COMPREHENSIVE METABOLIC PANEL - Abnormal; Notable for the following components:    CO2 30 (*)     BUN 27 (*)     Albumin 3.3 (*)     All other components within normal limits   HIV 1 / 2 ANTIBODY    Narrative:     add on tsh,mag,and troponin per dr nola gasca/order#[id number] @0:25  11/30/2022          Release to patient->Immediate   HEPATITIS C ANTIBODY    Narrative:     add on tsh,mag,and troponin per dr nola gasca/order#[id number] @0:25 11/30/2022          Release to patient->Immediate   URINALYSIS, REFLEX TO URINE CULTURE    Narrative:     Specimen Source->Urine   TSH   TROPONIN I   MAGNESIUM   LACTIC ACID, PLASMA   TSH    Narrative:     add on tsh,mag,and troponin per dr nola gasca/order#[id number] @0:25 11/30/2022          Release to patient->Immediate   MAGNESIUM    Narrative:     add on tsh,mag,and troponin per dr nola gasca/order#[id number] @0:25 11/30/2022          Release to patient->Immediate   HEMOGLOBIN A1C   TYPE & SCREEN        ECG Results              EKG 12-lead (Final result)  Result time 11/30/22 08:48:54      Final result by Interface, Lab [facility] (11/30/22 08:48:54)                   Narrative:    Test Reason : R77.8,    Vent. Rate : 080 BPM     Atrial Rate : 080 BPM     P-R Int : 156 ms          QRS Dur : 094 ms      QT Int : 384 ms       P-R-T Axes : 076 070 -89 degrees     QTc Int : 442 ms    Sinus rhythm with occasional Premature ventricular complexes  Nonspecific T wave abnormality  Abnormal ECG  When compared with ECG of 26-NOV-2022 11:23,  Premature ventricular complexes are now Present  Confirmed by ABDON DAVIS, BILLIE ([id number]) on 11/30/2022 8:48:46 AM    Referred By: SACHIN   SELF           Confirmed By:BILLIE COPPOLA MD                                  Imaging Results              CTA Chest Non-Coronary (PE Studies) (Final result)  Result time 11/30/22 04:40:59      Final result by Aleksey Nguyễn MD (11/30/22 04:40:59)                   Impression:      No pulmonary thromboembolism.    Small bilateral pleural fluid, right greater than left.    Mild cardiomegaly.    Enlarged pulmonary artery suggesting pulmonary hypertension.    Electronically signed by resident: Dajuan  Alsaggaf  Date:    11/30/2022  Time:    04:04    Electronically signed by: Aleksey Nguyễn MD  Date:    11/30/2022  Time:    04:40               Narrative:    EXAMINATION:  CTA CHEST NON CORONARY (PE STUDIES)    CLINICAL HISTORY:  Pulmonary embolism (PE) suspected, unknown D-dimer;    TECHNIQUE:  Low dose axial images, sagittal and coronal reformations were obtained from the thoracic inlet to the lung bases following the IV administration of 75 mL of Omnipaque 350.  Contrast timing was optimized to evaluate the pulmonary arteries.    COMPARISON:  Chest radiograph 11/29/2022    FINDINGS:  Pulmonary vasculature: Satisfactory opacification of the pulmonary arterial system with no filling defect to the segmental level.  Enlarged pulmonary artery suggesting pulmonary hypertension.    Aorta: Left-sided aortic arch.  No aneurysm and no significant atherosclerosis    Base of Neck: No significant abnormality.    Thoracic soft tissues: Normal.    Heart: Mildly enlarged    Ana/Mediastinum: No pathologic bernard enlargement.    Airways: Patent.    Lungs/Pleura: Small bilateral pleural fluid, right greater than left    Esophagus: Normal.    Upper Abdomen: No abnormality of the partially imaged upper abdomen.    Bones: Degenerative spine changes.  Multilevel ossification of the anterior aspect of the thoracic vertebral, could represent DISH.  No acute fracture or suspicious osseous lesions.                                       X-Ray Chest AP Portable (Final result)  Result time 11/29/22 23:56:58      Final result by Luciano Branham DO (11/29/22 23:56:58)                   Impression:      Pulmonary edema/CHF, slightly improved prior.      Electronically signed by: Luciano Branham  Date:    11/29/2022  Time:    23:56               Narrative:    EXAMINATION:  XR CHEST AP PORTABLE    CLINICAL HISTORY:  Chest Pain;    TECHNIQUE:  Single frontal view of the chest was performed.    COMPARISON:  11/26/2022.    FINDINGS:  There is  nonspecific elevation of the left hemidiaphragm.  The lungs are otherwise well expanded.  There are mild to moderate interstitial and hazy airspace opacities, with slight improvement from prior.  No new focal consolidation.  The pleural spaces are clear.  The cardiac silhouette is enlarged.  There are calcifications of the aortic arch.  The visualized osseous structures demonstrate degenerative changes.                                       Medications   sodium chloride 0.9% flush 10 mL (has no administration in time range)   acetaminophen tablet 650 mg (has no administration in time range)   polyethylene glycol packet 17 g (has no administration in time range)   senna-docusate 8.6-50 mg per tablet 1 tablet (has no administration in time range)   ondansetron disintegrating tablet 8 mg (has no administration in time range)   aspirin EC tablet 81 mg (has no administration in time range)   enoxaparin injection 60 mg (60 mg Subcutaneous Given 11/30/22 0324)   melatonin tablet 6 mg (has no administration in time range)   atorvastatin tablet 80 mg (has no administration in time range)   losartan tablet 25 mg (has no administration in time range)   furosemide injection 40 mg (40 mg Intravenous Given 11/30/22 0326)   nitroGLYCERIN SL tablet 0.4 mg (has no administration in time range)   ticagrelor tablet 90 mg (has no administration in time range)   aspirin tablet 325 mg (325 mg Oral Given 11/30/22 0316)   ticagrelor tablet 180 mg (180 mg Oral Given 11/30/22 0430)   iohexoL (OMNIPAQUE 350) injection 75 mL (75 mLs Intravenous Given 11/30/22 0339)     Medical Decision Making:   History:   I obtained history from: another health care provider.       <> Summary of History: I called [facility] to get history of present illness  Old Medical Records: I decided to obtain old medical records.  Old Records Summarized: records from clinic visits.       <> Summary of Records: Prior records reviewed for past medical history and  current medications  Initial Assessment:   82-year-old male in no acute distress.  Patient denies any chest pain, initial EKG does not demonstrate STEMI.  There are no new apparent neurologic deficits on head-to-toe exam.  On further review of the patient's chart, he was scheduled for lower extremity Doppler today for right lower extremity pain.  The patient denies any pain in his leg today and there is no swelling or erythema.  Can not exclude PE as a cause of the patient's rising troponin, will CTA.    Ddx: NSTEMI vs PE   Clinical Tests:   Lab Tests: Reviewed  Radiological Study: Reviewed  Medical Tests: Reviewed  ED Management:  Repeat troponin continues to increase, labs otherwise unremarkable, renal function at baseline.  Patient was placed on Lovenox ACS protocol and admitted to Hospital Medicine pending PE study.          Attending Attestation:             Attending ED Notes:   Attending Note:  I have seen the patient, have repeated the key portions of the history and physical, reviewed and agree with the medical documentation, and supervised and managed the medical care of the patient. Additionally, I was present for the critical portion of any procedure(s) performed.      82 M hx above here for elevated trop.  Currently denies chest pain. EKG w/ subtle changes, no stemi  CTA done to r/o PE  Trop increased to 1.3.    Plan for ACS.  CTA pending at time of admission and will be followed by inpatient team.    Critical Care time:35 minutes inclusive of direct patient care, review of previous records, interpretation of labs, imaging and ekg, as well as discussion of my impression and plan of care with the patient, family and other clinicians/consultants. This time is exclusive of any separate billable procedures and of treating other patients.    BIN Mclean MD  Staff ED Physician  12/04/2022 5:58 AM              ED Course as of 11/30/22 0908   Wed Nov 30, 2022   0050 Troponin I(!) [BP]   0050 Lactate, Jimmie: 1.2  [BP]   0051 NITRITE UA: Negative [BP]   0051 Leukocytes, UA: Negative [BP]   0124 Troponin I(!): 1.321 [GM]      ED Course User Index  [BP] Neftaly Chapman MD  [GM] Elaine Mclean MD                 Clinical Impression:   Final diagnoses:  [R77.8] Troponin level elevated (Primary)  [I21.4] NSTEMI (non-ST elevated myocardial infarction)               Neftaly Chapman MD  Resident  11/30/22 0908       Elaine Mclean MD  12/04/22 05

## 2022-11-30 NOTE — ED NOTES
Gregoroi Bihsop, an 82 y.o. male presents to the ED from [facility] for elevated trop levels. Pt denies any CP at this time. Given 324 ASA and 2 nitro sprays. Per nursing home, pt has had episodes of confusion and not sleeping well over the last few days. Pt denies SOB, abd pain, N/V/D, fever. Pt is alert, disoriented to time and situation.      Review of patient's allergies indicates:  No Known Allergies  Chief Complaint   Patient presents with    Abnormal Lab     From [facility] where is stays  for debility. Sent for cardiology evaluation after elevated trops, Trop #1 0.389, Trop #2 0.901, given 324 ASA and 2 sprays Nitro, intermittent Cp for a few days     Past Medical History:   Diagnosis Date    Hypertension     Stroke

## 2022-11-30 NOTE — CONSULTS
[facility] - Cardiology Stepdown  Cardiology  Consult Note    Patient Name: Gregorio Bishop  MRN: [id number]  Admission Date: 11/29/2022  Hospital Length of Stay: 0 days  Code Status: Full Code   Attending Provider: Tristen Amanda MD   Consulting Provider: Jordyn Cornell DO  Primary Care Physician: Renaldo Molina MD  Principal Problem:NSTEMI (non-ST elevated myocardial infarction)    Patient information was obtained from patient, spouse/SO and ER records.     Inpatient consult to Cardiology  Consult performed by: Jordyn Cornell DO  Consult ordered by: Jailene Sanchez MD        Subjective:     Chief Complaint:  Chest pain    HPI:   Mr. Bishop is an 82 year old male with pmhx of CHF, CVA with RLE deficits, and HTN who presented from rehabilitation center with complaints of progressive dyspnea on exertion for approximately two weeks. Patient also reports of episode of substernal chest pain while sitting yesterday. He notes of associated nausea and SOB. Duration of pain was 10 minutes and pain did not radiate. Of significance, patient was recently admitted for management of CHF (11/26/22). During that hospitalization, echo was performed and showed decreased systolic function with EF of 15%. In the ED, patient was found to have an elevated troponin and BNP. No ischemic changes seen on EKG. CTA chest negative for pulmonary embolism. CXR concerning for pulmonary edema. Patient was admitted for concerns for NSTEMI and was started on ACS protocol.     Cardiology consulted for evaluation and management of NSTEMI.       Past Medical History:   Diagnosis Date    Hypertension     Stroke        No past surgical history on file.    Review of patient's allergies indicates:  No Known Allergies    No current facility-administered medications on file prior to encounter.     Current Outpatient Medications on File Prior to Encounter   Medication Sig    atorvastatin (LIPITOR) 80 MG tablet Take 1 tablet (80 mg total) by mouth every  evening.    losartan (COZAAR) 25 MG tablet Take 1 tablet (25 mg total) by mouth once daily.    acetaminophen (TYLENOL) 325 MG tablet Take 650 mg by mouth every 8 (eight) hours as needed.    aspirin (ECOTRIN) 81 MG EC tablet Take 1 tablet (81 mg total) by mouth once daily.    cyanocobalamin (VITAMIN B-12) 1000 MCG tablet Take 1 tablet (1,000 mcg total) by mouth once daily.    furosemide (LASIX) 20 MG tablet Take 1 tablet (20 mg total) by mouth once daily.     Family History       Problem Relation (Age of Onset)    Alcohol abuse Father    Hypertension Mother, Paternal Aunt, Paternal Grandmother, Paternal Grandfather    No Known Problems Sister, Daughter, Son          Tobacco Use    Smoking status: Never    Smokeless tobacco: Never   Substance and Sexual Activity    Alcohol use: Not Currently     Comment: no alcohol for 6 months    Drug use: Never    Sexual activity: Not Currently     Review of Systems   Constitutional: Positive for malaise/fatigue. Negative for chills, diaphoresis and fever.   HENT:  Negative for congestion.    Eyes:  Negative for photophobia.   Cardiovascular:  Positive for chest pain and dyspnea on exertion. Negative for leg swelling, palpitations and syncope.   Respiratory:  Positive for hemoptysis. Negative for cough, shortness of breath and wheezing.    Gastrointestinal:  Positive for nausea. Negative for abdominal pain, diarrhea and vomiting.   Neurological:  Positive for difficulty with concentration, focal weakness and weakness. Negative for dizziness and headaches.   Objective:     Vital Signs (Most Recent):  Temp: 97.6 °F (36.4 °C) (11/30/22 1117)  Pulse: 85 (11/30/22 1124)  Resp: 18 (11/30/22 1117)  BP: (!) 148/100 (11/30/22 1117)  SpO2: (!) 92 % (11/30/22 1117)   Vital Signs (24h Range):  Temp:  [96.5 °F (35.8 °C)-98.6 °F (37 °C)] 97.6 °F (36.4 °C)  Pulse:  [62-92] 85  Resp:  [16-20] 18  SpO2:  [92 %-98 %] 92 %  BP: (128-148)/() 148/100     Weight: 59.2 kg (130 lb 8.2 oz)  Body mass  index is 22.4 kg/m².    SpO2: (!) 92 %  O2 Device (Oxygen Therapy): room air      Intake/Output Summary (Last 24 hours) at 11/30/2022 1327  Last data filed at 11/30/2022 1157  Gross per 24 hour   Intake --   Output 1350 ml   Net -1350 ml       Lines/Drains/Airways       Drain  Duration             Male External Urinary Catheter 11/29/22 2352 Medium <1 day              Peripheral Intravenous Line  Duration                  Peripheral IV - Single Lumen 11/29/22 2254 20 G Anterior;Left Forearm <1 day                    Physical Exam  Vitals and nursing note reviewed.   Constitutional:       General: He is not in acute distress.     Appearance: He is cachectic. He is not toxic-appearing or diaphoretic.   HENT:      Head: Normocephalic.      Right Ear: External ear normal.      Left Ear: External ear normal.      Nose: Nose normal.      Mouth/Throat:      Mouth: Mucous membranes are moist.   Eyes:      Extraocular Movements: Extraocular movements intact.      Conjunctiva/sclera: Conjunctivae normal.      Pupils: Pupils are equal, round, and reactive to light.   Cardiovascular:      Rate and Rhythm: Normal rate and regular rhythm.      Pulses: Normal pulses.      Heart sounds: No murmur heard.  Pulmonary:      Effort: Pulmonary effort is normal. No respiratory distress.      Breath sounds: Normal breath sounds. No wheezing.   Abdominal:      General: Abdomen is flat. Bowel sounds are normal. There is no distension.      Palpations: Abdomen is soft.      Tenderness: There is no abdominal tenderness.   Musculoskeletal:      Cervical back: Normal range of motion and neck supple.      Right lower leg: No edema.      Left lower leg: No edema.   Skin:     General: Skin is warm and dry.   Neurological:      Mental Status: He is alert and oriented to person, place, and time.       Significant Labs: CMP   Recent Labs   Lab 11/29/22  2328 11/30/22  0415    139   K 4.1 3.5    100   CO2 28 30*   * 95   BUN 30* 27*    CREATININE 1.0 1.0   CALCIUM 8.8 9.0   PROT 6.7 7.0   ALBUMIN 3.1* 3.3*   BILITOT 0.8 1.0   ALKPHOS 71 73   AST 35 32   ALT 23 23   ANIONGAP 10 9    and CBC   Recent Labs   Lab 11/29/22  2328 11/30/22  0415   WBC 10.36 9.82   HGB 12.7* 13.8*   HCT 39.5* 42.0    229       Significant Imaging: X-Ray: CXR: bilateral pulmonary edema; CTA chest: No pulmonary embolism    Assessment and Plan:     * NSTEMI (non-ST elevated myocardial infarction)  Mr. Bishop is an 82 year old male with pmhx of CHF, CVA with RLE deficits, and HTN who presented with complaints of progressive dyspnea on exertion for approximately two weeks. Patient also reports of episode of substernal chest pain while sitting yesterday with associated nausea and SOB. Patient found to have an elevated troponin and BNP with no ischemic changes seen on EKG. Lipid panel unremarkable. Cardiology consulted for evaluation and management of NSTEMI.     Plan:   -Recommend continuation of lasix, DAPT, atorvastatin, losartan, and lovenox. Nitro PRN for chest pain.  -Recommend low dose entresto daily  -Recommend digoxin 0.5mg today, 0.25mg 6 hours after first dose, and then 0.125 mg daily  -Initial troponin 0.07, will trend until it peaks  -11/26: echo was performed and showed decreased systolic function with EF of 15%          VTE Risk Mitigation (From admission, onward)           Ordered     enoxaparin injection 60 mg  Every 12 hours (non-standard times)         11/30/22 0256     Place sequential compression device  Until discontinued         11/30/22 0256                    Thank you for your consult. I will follow-up with patient. Please contact us if you have any additional questions.    Jordyn Cornell, DO  Cardiology   [facility] - Cardiology Stepdown  I have personally taken the history and examined the patient and agree with the resident's note as stated above.  Not a great cath/revasc candidatebut discussed with he and family who elect med treatment  unless very unstable. Replace K, change to Entresto, and digitalize. Prognosis poor.

## 2022-11-30 NOTE — HPI
83 yo M PMHx CHF, CVA with RLE deficits presents from rehabilitation center to [facility] due to 2 week history of progressive SHORE and associated chest pressure in the substernal region without radiation.  Patient denies any overt feeling of pain, instead feeling chest fullness and chest pressure, mostly when attempting to ambulate at the rehabilitation center.  Patient has also been describing increased dyspnea both on exertion and at rest.  Has difficulty feeling like he gets a full breath in.  Denies abdominal pain, jaw pain, arm pain, nausea, vomiting, diarrhea, fever, chills, cough, sick contacts.  Patient is unsure of what medications he takes.  Patient describes his diet as whatever he feels like eating.  Denies smoking history, reason alcohol use, illicit drugs.  Patient was previously admitted for treatment of congestive heart failure 11/26.    Patient admitted to Hospital Medicine for further evaluation of elevated troponin, shortness of breath without hypoxemia, concerns for NSTEMI.

## 2022-11-30 NOTE — SUBJECTIVE & OBJECTIVE
Past Medical History:   Diagnosis Date    Hypertension     Stroke        No past surgical history on file.    Review of patient's allergies indicates:  No Known Allergies    No current facility-administered medications on file prior to encounter.     Current Outpatient Medications on File Prior to Encounter   Medication Sig    acetaminophen (TYLENOL) 325 MG tablet Take 650 mg by mouth every 8 (eight) hours as needed.    aspirin (ECOTRIN) 81 MG EC tablet Take 1 tablet (81 mg total) by mouth once daily.    atorvastatin (LIPITOR) 80 MG tablet Take 1 tablet (80 mg total) by mouth every evening.    cyanocobalamin (VITAMIN B-12) 1000 MCG tablet Take 1 tablet (1,000 mcg total) by mouth once daily.    furosemide (LASIX) 20 MG tablet Take 1 tablet (20 mg total) by mouth once daily.    losartan (COZAAR) 25 MG tablet Take 1 tablet (25 mg total) by mouth once daily.     Family History       Problem Relation (Age of Onset)    Alcohol abuse Father    Hypertension Mother, Paternal Aunt, Paternal Grandmother, Paternal Grandfather    No Known Problems Sister, Daughter, Son          Tobacco Use    Smoking status: Never    Smokeless tobacco: Never   Substance and Sexual Activity    Alcohol use: Not Currently     Comment: no alcohol for 6 months    Drug use: Never    Sexual activity: Not Currently     Review of Systems   Constitutional:  Positive for fatigue. Negative for chills, diaphoresis and fever.   HENT:  Negative for rhinorrhea, sneezing, sore throat and trouble swallowing.    Respiratory:  Positive for chest tightness and shortness of breath. Negative for cough.    Cardiovascular:  Positive for chest pain (pressure). Negative for palpitations.   Gastrointestinal:  Positive for constipation. Negative for diarrhea, nausea and vomiting.   Genitourinary:  Negative for dysuria, frequency and urgency.   Musculoskeletal:  Negative for arthralgias and myalgias.   Skin:  Negative for rash and wound.   Neurological:  Positive for  weakness. Negative for headaches.   Psychiatric/Behavioral:  Negative for agitation and confusion.    Objective:     Vital Signs (Most Recent):  Temp: 98 °F (36.7 °C) (11/30/22 0045)  Pulse: 84 (11/30/22 0257)  Resp: 18 (11/30/22 0257)  BP: 128/82 (11/30/22 0230)  SpO2: 98 % (11/30/22 0257) Vital Signs (24h Range):  Temp:  [98 °F (36.7 °C)-98.6 °F (37 °C)] 98 °F (36.7 °C)  Pulse:  [77-84] 84  Resp:  [16-18] 18  SpO2:  [97 %-98 %] 98 %  BP: (128-133)/(82-89) 128/82     Weight: 59 kg (130 lb)  Body mass index is 22.31 kg/m².    Physical Exam  Constitutional:       Appearance: Normal appearance.   HENT:      Head: Normocephalic.   Eyes:      Extraocular Movements: Extraocular movements intact.      Pupils: Pupils are equal, round, and reactive to light.   Neck:      Comments: JVD+  Cardiovascular:      Rate and Rhythm: Normal rate and regular rhythm.      Pulses: Normal pulses.      Heart sounds: Normal heart sounds. No murmur heard.    No friction rub. No gallop.   Pulmonary:      Effort: Pulmonary effort is normal. No respiratory distress.      Breath sounds: Rales present.   Chest:      Chest wall: No tenderness.   Abdominal:      General: Abdomen is flat.      Palpations: Abdomen is soft.      Tenderness: There is no abdominal tenderness. There is no guarding or rebound.   Musculoskeletal:      Cervical back: Normal range of motion and neck supple.      Right lower leg: No edema.      Left lower leg: No edema.   Skin:     General: Skin is warm and dry.   Neurological:      General: No focal deficit present.      Mental Status: He is alert and oriented to person, place, and time.         CRANIAL NERVES     CN III, IV, VI   Pupils are equal, round, and reactive to light.     Significant Labs: All pertinent labs within the past 24 hours have been reviewed.    Significant Imaging: I have reviewed all pertinent imaging results/findings within the past 24 hours.

## 2022-11-30 NOTE — PHARMACY MED REC
"Admission Medication History     The home medication history was taken by Stacy Brito.    You may go to "Admission" then "Reconcile Home Medications" tabs to review and/or act upon these items.       Medications listed below were obtained from: SNF/Rehab/[facility]     Admission medications:  Current Facility-Administered Medications      acetaminophen (TYLENOL) 8 hr tablet 650 mg 650 mg Oral Q8H PRN      aspirin chewable tablet 81 mg 81 mg Oral Once a      atorvastatin (LIPITOR) tablet 80 mg 80 mg Oral      bisacodyl (DULCOLAX) suppository 10 mg 10 mg Rectal Daily      enoxaparin (LOVENOX) syringe 40 mg 40 mg Subcutaneous Q24H ELIZA      furosemide (LASIX) tablet 20 mg 20 mg Oral Once a day      losartan (COZAAR) tablet 25 mg 25 mg Oral Once a day      melatonin tablet 6 mg 6 mg Oral Nightly PRN      polyethylene glycol (MIRALAX) packet 17 g 17 g Oral Daily PRN      senna-docusate (SENOKOT-S) 8.6-50 MG tablet 1 tablet 1 tablet Oral Nightly PRN      vitamin B-12 (CYANOCOBALAMIN) tablet 1,000 mcg 1,000 mcg Oral Once a day     Potential issues to be addressed PRIOR TO DISCHARGE  The listed medications were obtained from another facility ([facility] ). The patient may not have been able to fill these prescriptions prior to this admission and may require new scripts upon discharge.     Stacy Brito  EXT [id number]                  .        "

## 2022-11-30 NOTE — ASSESSMENT & PLAN NOTE
Symptoms: 2-3 weeks of progressive SHORE, dyspnea at rest, orthopnea. Previously admitted 11/26 for ADHF, treated with diuretics.  TTE (11/2022) EF 15%, G3LVDD, decreased from study 09/2022 25%, G1LVDD.  Home meds: losartan, furosemide 20 qd  Dry weight: Unknown  Diagnostics: CXR: pulmonary edema; BNP 2574; Lactate 1.2; O2 requirement RA SpO2 high 90s    Initial etiology: Possibly ischemic cardiomyopathy s/o prev CVA and athersclerotic disease. Patient was not told he had CHF before, family at bedside has not heart CHF diagnosis before either.  Decompensation etiology: Unclear if new ischemic event is causing further CHF decompensation or if CHF decompensation d/t suboptimal medication regimen, diet noncompliance etc causing NSTEMI d/t demand mismatch.      -- Aggressive diuresis: Lasix 40 IV; relatively Lasix naive, only recently started on this medication   -- Daily weights (standing if tolerated)  -- Strict I/Os; goal net negative 1.5 - 2 L / day  -- Fluid restriction at 1500mL  -- Cardiac diet w/ 2 g Na restriction when diet tolerated  -- Continue GDMT as tolerated; start BB on discharge

## 2022-11-30 NOTE — HPI
82 year old male patient was woken up by family at around 3pm and noticed that he had new right facial droop and speech changes.  Nursing was immediately notified and was stroke activated.  Family states the patient went to sleep around noon and was woken up by family when the food arrived.  They noticed he was slow to wake, and wouldn't respond appropriately.  They then noticed the right facial weakness and incomprehensible speech.    Patient is currently being admitted and worked up for non-ST segment elevation myocardial infarction.  He is currently on aspirin, brilinta and therapeutic lovenox.     Patient has a past medical history of hypertension, hyperlipidemia, congestive heart failure (currently at 15%), and multiple previous strokes with known baseline right sided weakness and deficits.      On assessment patient was confused, poorly responsive, right facial, speech garbled, and right sided weakness.  CT head was ordered and CT angiogram was ordered.  Patient currently on medical maximal therapy, and has no new acute intracranial findings and no large vessel occlusion.  This is likely a recrudescence on known old strokes in the setting of ACS and or hypoperfusion.

## 2022-11-30 NOTE — CODE/ RAPID DOCUMENTATION
RAPID RESPONSE NURSE STROKE CODE NOTE         Admit Date: 2022  LOS: 0  Code Status: Full Code   Date of Consult: 2022  : 1940  Age: 82 y.o.  Weight:   Wt Readings from Last 1 Encounters:   22 59.2 kg (130 lb 8.2 oz)     Sex: male  Race: Black or    Bed: 321/321 A:   MRN: [id number]  Time Rapid Response Team page Received: 1430   Time Rapid Response Team at Bedside: 1432  Time Rapid Response Team left Bedside: 1525  Was the patient discharged from an ICU this admission? No   Was the patient discharged from a PACU within last 24 hours? No   Did the patient receive conscious sedation/general anesthesia in last 24 hours? No  Was the patient in the ED within the past 24 hours? Yes  Was the patient on NIPPV within the past 24 hours? No   Did this progress into an ARC or CPA: no  Attending Physician: Tristen Amanda MD  Primary Service: [facility]       SITUATION    Notified by pager.  Called to evaluate the patient for Neuro    BACKGROUND    Why is the patient in the hospital?: NSTEMI (non-ST elevated myocardial infarction)  Patient has a past medical history of Hypertension and Stroke.    ASSESSMENT    Last VS: BP (!) 149/89 (BP Location: Left arm)   Pulse 87   Temp 97.6 °F (36.4 °C) (Oral)   Resp 16   Wt 59.2 kg (130 lb 8.2 oz)   SpO2 99%   BMI 22.40 kg/m²     24H Vital Sign Range:  Temp:  [96.5 °F (35.8 °C)-98.6 °F (37 °C)]   Pulse:  [62-99]   Resp:  [16-20]   BP: (128-149)/()   SpO2:  [92 %-100 %]     Last know well time: 1415    Glucose time: na   Glucose result: na    Physical Exam  HENT:      Head: Normocephalic.   Cardiovascular:      Rate and Rhythm: Tachycardia present.   Pulmonary:      Effort: Respiratory distress present.      Breath sounds: Normal breath sounds.   Neurological:      Mental Status: He is lethargic and disoriented.      GCS: GCS eye subscore is 2. GCS verbal subscore is 4. GCS motor subscore is 6.       Time Stroke Code initiated:  1430  Stroke team Arrival time: 1435  Stroke Code activation triggers: slurred speech AMS  Vascular Neurology provider you spoke with:  Dr. Apple     Time arrived at CT: 1442  Time CT completed: 1447    VAN positive or negative: negative    Thrombolytic decision: no  Thrombolytic bolus (mg and time): na  Thrombolytic infusion (mg and time): na  Thrombolytic end time: na    IR decision: no  IR arrival: na  IR end time: na     RECOMMENDATIONS    We recommend: -goals of plan discussion  -abg,cbc, cmp, lactic acid and ekg     FOLLOW-UP/PLAN    Call the Rapid Response Nurse, Henry Neff RN at [phone] for additional questions or concerns.    PHYSICIAN ESCALATION    Orders received and case discussed with Dr. Apple     Disposition: Remain in room 321.

## 2022-11-30 NOTE — HPI
Mr. Bishop is an 82 year old male with pmhx of CHF, CVA with RLE deficits, and HTN who presented from rehabilitation center with complaints of progressive dyspnea on exertion for approximately two weeks. Patient also reports of episode of substernal chest pain while sitting yesterday. He notes of associated nausea and SOB. Duration of pain was 10 minutes and pain did not radiate. Of significance, patient was recently admitted for management of CHF (11/26/22). During that hospitalization, echo was performed and showed decreased systolic function with EF of 15%. In the ED, patient was found to have an elevated troponin and BNP. No ischemic changes seen on EKG. CTA chest negative for pulmonary embolism. CXR concerning for pulmonary edema. Patient was admitted for concerns for NSTEMI and was started on ACS protocol.     Cardiology consulted for evaluation and management of NSTEMI.

## 2022-11-30 NOTE — SUBJECTIVE & OBJECTIVE
Past Medical History:   Diagnosis Date    Hypertension     Stroke        No past surgical history on file.    Review of patient's allergies indicates:  No Known Allergies    No current facility-administered medications on file prior to encounter.     Current Outpatient Medications on File Prior to Encounter   Medication Sig    atorvastatin (LIPITOR) 80 MG tablet Take 1 tablet (80 mg total) by mouth every evening.    losartan (COZAAR) 25 MG tablet Take 1 tablet (25 mg total) by mouth once daily.    acetaminophen (TYLENOL) 325 MG tablet Take 650 mg by mouth every 8 (eight) hours as needed.    aspirin (ECOTRIN) 81 MG EC tablet Take 1 tablet (81 mg total) by mouth once daily.    cyanocobalamin (VITAMIN B-12) 1000 MCG tablet Take 1 tablet (1,000 mcg total) by mouth once daily.    furosemide (LASIX) 20 MG tablet Take 1 tablet (20 mg total) by mouth once daily.     Family History       Problem Relation (Age of Onset)    Alcohol abuse Father    Hypertension Mother, Paternal Aunt, Paternal Grandmother, Paternal Grandfather    No Known Problems Sister, Daughter, Son          Tobacco Use    Smoking status: Never    Smokeless tobacco: Never   Substance and Sexual Activity    Alcohol use: Not Currently     Comment: no alcohol for 6 months    Drug use: Never    Sexual activity: Not Currently     Review of Systems   Constitutional: Positive for malaise/fatigue. Negative for chills, diaphoresis and fever.   HENT:  Negative for congestion.    Eyes:  Negative for photophobia.   Cardiovascular:  Positive for chest pain and dyspnea on exertion. Negative for leg swelling, palpitations and syncope.   Respiratory:  Positive for hemoptysis. Negative for cough, shortness of breath and wheezing.    Gastrointestinal:  Positive for nausea. Negative for abdominal pain, diarrhea and vomiting.   Neurological:  Positive for difficulty with concentration, focal weakness and weakness. Negative for dizziness and headaches.   Objective:     Vital  Signs (Most Recent):  Temp: 97.6 °F (36.4 °C) (11/30/22 1117)  Pulse: 85 (11/30/22 1124)  Resp: 18 (11/30/22 1117)  BP: (!) 148/100 (11/30/22 1117)  SpO2: (!) 92 % (11/30/22 1117)   Vital Signs (24h Range):  Temp:  [96.5 °F (35.8 °C)-98.6 °F (37 °C)] 97.6 °F (36.4 °C)  Pulse:  [62-92] 85  Resp:  [16-20] 18  SpO2:  [92 %-98 %] 92 %  BP: (128-148)/() 148/100     Weight: 59.2 kg (130 lb 8.2 oz)  Body mass index is 22.4 kg/m².    SpO2: (!) 92 %  O2 Device (Oxygen Therapy): room air      Intake/Output Summary (Last 24 hours) at 11/30/2022 1327  Last data filed at 11/30/2022 1157  Gross per 24 hour   Intake --   Output 1350 ml   Net -1350 ml       Lines/Drains/Airways       Drain  Duration             Male External Urinary Catheter 11/29/22 2352 Medium <1 day              Peripheral Intravenous Line  Duration                  Peripheral IV - Single Lumen 11/29/22 2254 20 G Anterior;Left Forearm <1 day                    Physical Exam  Vitals and nursing note reviewed.   Constitutional:       General: He is not in acute distress.     Appearance: He is cachectic. He is not toxic-appearing or diaphoretic.   HENT:      Head: Normocephalic.      Right Ear: External ear normal.      Left Ear: External ear normal.      Nose: Nose normal.      Mouth/Throat:      Mouth: Mucous membranes are moist.   Eyes:      Extraocular Movements: Extraocular movements intact.      Conjunctiva/sclera: Conjunctivae normal.      Pupils: Pupils are equal, round, and reactive to light.   Cardiovascular:      Rate and Rhythm: Normal rate and regular rhythm.      Pulses: Normal pulses.      Heart sounds: No murmur heard.  Pulmonary:      Effort: Pulmonary effort is normal. No respiratory distress.      Breath sounds: Normal breath sounds. No wheezing.   Abdominal:      General: Abdomen is flat. Bowel sounds are normal. There is no distension.      Palpations: Abdomen is soft.      Tenderness: There is no abdominal tenderness.   Musculoskeletal:       Cervical back: Normal range of motion and neck supple.      Right lower leg: No edema.      Left lower leg: No edema.   Skin:     General: Skin is warm and dry.   Neurological:      Mental Status: He is alert and oriented to person, place, and time.       Significant Labs: CMP   Recent Labs   Lab 11/29/22 2328 11/30/22 0415    139   K 4.1 3.5    100   CO2 28 30*   * 95   BUN 30* 27*   CREATININE 1.0 1.0   CALCIUM 8.8 9.0   PROT 6.7 7.0   ALBUMIN 3.1* 3.3*   BILITOT 0.8 1.0   ALKPHOS 71 73   AST 35 32   ALT 23 23   ANIONGAP 10 9    and CBC   Recent Labs   Lab 11/29/22 2328 11/30/22 0415   WBC 10.36 9.82   HGB 12.7* 13.8*   HCT 39.5* 42.0    229       Significant Imaging: X-Ray: CXR: bilateral pulmonary edema; CTA chest: No pulmonary embolism

## 2022-11-30 NOTE — ASSESSMENT & PLAN NOTE
Previous CVA  Presenting from rehabilitation center. No signs of new CVA. Per family, at new baseline functionality.    -- Fall precaution  -- Aspiration precaution  -- Rest per NSTEMI A/P

## 2022-11-30 NOTE — NURSING
Family stopped nurse in hallway to explain that pt did not seem to look right.  Assessment was done, pt displayed delayed response with incoherent speech.  Pt. Mouth was also open and when asked to to close, unable to follow prompts.  Pt motor response intact.Glucose and vitals checked;  [facility] was called and notified of findings.  Code stroke was called.

## 2022-11-30 NOTE — ED NOTES
Report received from coreen henderson. Care of pt assumed. Pt aaox4. Neuro intact. Resp even and unlabored. Pt denies cp/sob or any other complaints at this time. Family present at bedside. Ccm/bp/o2 monitoring in place. Call light within reach. Pt and family aware of poc.

## 2022-11-30 NOTE — SIGNIFICANT EVENT
RAPID RESPONSE RESPIRATORY THERAPY STROKE CODE NOTE             Code Status: DNR   : 1940  Bed: 321/321 A:   MRN: [id number]  Time page Received: 1429  Time Rapid Response RT at Bedside: 1431  Time Rapid Response RT left Bedside: 1530    SITUATION    Evaluated patient for: Stroke Code     BACKGROUND    Why is the patient in the hospital?: NSTEMI (non-ST elevated myocardial infarction)    Patient has a past medical history of Hypertension and Stroke.    24 Hours Vitals Range:  Temp:  [96.5 °F (35.8 °C)-98.6 °F (37 °C)]   Pulse:  [62-99]   Resp:  [16-20]   BP: (128-149)/()   SpO2:  [92 %-100 %]     Labs:    Recent Labs     22  0527 22  2328 22  0415    139 139   K 4.0 4.1 3.5    101 100   CO2 28 28 30*   CREATININE 1.1 1.0 1.0   * 118* 95   MG 1.8 1.9  --         Recent Labs     22  1522   PH 7.444   PCO2 57.8*   PO2 133*   HCO3 39.6*   POCSATURATED 99   BE 16       ASSESSMENT/INTERVENTIONS    Stroke code called for change in mental status. Per RN's he became aphasic with facial droop. Pt on 1L NC. Sat %. Order given for CT scan. I assisted with transport to and from scan. EKG obtained upon return from CT. ABG obtained as well, results WNL except for a pCO2 of 57.8. Left pt bedside at 1530.    Last VS   Temp: 98.1 °F (36.7 °C) (1712)  Pulse: 87 (1712)  Resp: 18 (1712)  BP: 134/85 (1712)  SpO2: 97 % (1712)    Level of Consciousness: Level of Consciousness (AVPU): alert  Respiratory Effort: Respiratory Effort: Unlabored, Normal  Expansion/Accessory Muscle Usage: Expansion/Accessory Muscles/Retractions: no retractions, expansion symmetric  All Lung Field Breath Sounds: All Lung Fields Breath Sounds: Anterior:, Lateral:, clear, diminished, equal bilaterally  O2 Device/Concentration: 1L NC.  NIPPV: No  Surgical airway: No  ETCO2 monitored:    Ambu at bedside:      Active Orders   Respiratory Care    Pulse Oximetry Continuous      Frequency: Continuous     Number of Occurrences: Until Specified       RECOMMENDATIONS    We recommend: RRT Recs: Continue POC per primary team.    ESCALATION        FOLLOW-UP    Please call back the Rapid Response RT, Johnathan Cook, RRT at x [phone] for any questions or concerns.

## 2022-11-30 NOTE — ACP (ADVANCE CARE PLANNING)
Advance Care Planning     Date: 11/30/2022    Code Status  In light of the patients advanced and life limiting illness,I engaged the the family in a conversation about the patient's preferences for care  at the very end of life. The patient wishes to have a natural, peaceful death.  Along those lines, the patient does not wish to have CPR or other invasive treatments performed when his heart and/or breathing stops. I communicated to the family that a DNR order would be placed in his medical record to reflect this preference.  I spent a total of 20 minutes engaging the patient in this advance care planning discussion.

## 2022-11-30 NOTE — ASSESSMENT & PLAN NOTE
Symptoms: Presented with substernal, non-radiating chest pain, most associated with exertion. Also with associated dyspnea on exertion (and now at rest). No NTG was attempted.  Risk factors: Likely previous atherosclerotic disease (h/o CVA).   Diagnostics: EKG showed sinus rhythm with PVCs, non-specific T wave abnormalities; no over ischemic changes  Troponin uptrending prior to admission: 0.065 > 0.087 > 1.321, BNP 2574  BEBE 3 or 4  A/P: Unclear if new ischemic event is causing further CHF decompensation or if CHF decompensation d/t suboptimal medication regimen, diet noncompliance etc causing NSTEMI d/t demand mismatch.      -- DAPT load (ASA, Brillinta) and maintenance thereafter  -- Full AC with Lovenox (concerns for volume overload, so will defer heparin gtt d/t additional volume)  -- Trend trop to peak  -- Statin: atorvastatin 80 qd  -- B-blocker: n/a, start on discharge  -- ACEi/ARB: losartan 25  -- NTG PRN for chest pain  -- EKG STAT for new chest pain or hemodynamic instability  -- Inpatient consult to cardiology

## 2022-11-30 NOTE — ASSESSMENT & PLAN NOTE
Mr. Bishop is an 82 year old male with pmhx of CHF, CVA with RLE deficits, and HTN who presented with complaints of progressive dyspnea on exertion for approximately two weeks. Patient also reports of episode of substernal chest pain while sitting yesterday with associated nausea and SOB. Patient found to have an elevated troponin and BNP with no ischemic changes seen on EKG. Lipid panel unremarkable. Cardiology consulted for evaluation and management of NSTEMI.     Plan:   -Recommend continuation of lasix, DAPT, atorvastatin, losartan, and lovenox. Nitro PRN for chest pain.  -Recommend low dose entresto daily  -Recommend digoxin 0.5mg today, 0.25mg 6 hours after first dose, and then 0.125 mg daily  -Initial troponin 0.07, will trend until it peaks  -11/26: echo was performed and showed decreased systolic function with EF of 15%

## 2022-11-30 NOTE — SUBJECTIVE & OBJECTIVE
Past Medical History:   Diagnosis Date    Hypertension     Stroke      No past surgical history on file.  Family History   Problem Relation Age of Onset    Hypertension Mother     Alcohol abuse Father     No Known Problems Sister     No Known Problems Daughter     No Known Problems Son     Hypertension Paternal Aunt     Hypertension Paternal Grandmother     Hypertension Paternal Grandfather      Social History     Tobacco Use    Smoking status: Never    Smokeless tobacco: Never   Substance Use Topics    Alcohol use: Not Currently     Comment: no alcohol for 6 months    Drug use: Never     Review of patient's allergies indicates:  No Known Allergies    Medications: I have reviewed the current medication administration record.    Medications Prior to Admission   Medication Sig Dispense Refill Last Dose    atorvastatin (LIPITOR) 80 MG tablet Take 1 tablet (80 mg total) by mouth every evening. 90 tablet 3     losartan (COZAAR) 25 MG tablet Take 1 tablet (25 mg total) by mouth once daily. 90 tablet 3     acetaminophen (TYLENOL) 325 MG tablet Take 650 mg by mouth every 8 (eight) hours as needed.       aspirin (ECOTRIN) 81 MG EC tablet Take 1 tablet (81 mg total) by mouth once daily. 30 tablet 2     cyanocobalamin (VITAMIN B-12) 1000 MCG tablet Take 1 tablet (1,000 mcg total) by mouth once daily. 30 tablet 0     furosemide (LASIX) 20 MG tablet Take 1 tablet (20 mg total) by mouth once daily. 60 tablet 1      Review of Systems   Constitutional:  Negative for fatigue and fever.   HENT:  Positive for trouble swallowing and voice change. Negative for hearing loss and tinnitus.    Eyes:  Negative for visual disturbance.   Respiratory:  Negative for cough and shortness of breath.    Cardiovascular:         Could not determine   Gastrointestinal:  Negative for abdominal pain, nausea and vomiting.   Endocrine: Negative.    Genitourinary: Negative.    Musculoskeletal: Negative.    Skin:  Negative for rash.   Neurological:   Positive for facial asymmetry, speech difficulty and weakness. Negative for tremors, light-headedness, numbness and headaches.   Psychiatric/Behavioral:  Positive for confusion and decreased concentration.    Objective:     Vital Signs (Most Recent):  Temp: 97.6 °F (36.4 °C) (11/30/22 1117)  Pulse: 87 (11/30/22 1440)  Resp: 18 (11/30/22 1522)  BP: (!) 149/89 (11/30/22 1440)  SpO2: 99 % (11/30/22 1522)    Vital Signs Range (Last 24H):  Temp:  [96.5 °F (35.8 °C)-98.6 °F (37 °C)]   Pulse:  [62-99]   Resp:  [16-20]   BP: (128-149)/()   SpO2:  [92 %-100 %]     Physical Exam  Vitals and nursing note reviewed.   Constitutional:       General: He is in acute distress.      Appearance: Normal appearance. He is ill-appearing.   HENT:      Head: Normocephalic and atraumatic.      Nose: Nose normal.      Mouth/Throat:      Mouth: Mucous membranes are dry.      Pharynx: Oropharynx is clear.   Eyes:      General: No visual field deficit.     Extraocular Movements: Extraocular movements intact.      Pupils: Pupils are equal, round, and reactive to light.   Cardiovascular:      Rate and Rhythm: Normal rate and regular rhythm.      Pulses: Normal pulses.   Pulmonary:      Effort: Pulmonary effort is normal.   Abdominal:      General: There is no distension.      Palpations: Abdomen is soft.      Tenderness: There is no guarding.   Musculoskeletal:         General: Normal range of motion.      Cervical back: Normal range of motion.   Skin:     General: Skin is warm and dry.   Neurological:      Mental Status: He is lethargic, disoriented and confused.      GCS: GCS eye subscore is 3. GCS verbal subscore is 2. GCS motor subscore is 6.      Cranial Nerves: Cranial nerve deficit, dysarthria and facial asymmetry present.      Motor: Weakness and pronator drift present.      Coordination: Coordination abnormal. Finger-Nose-Finger Test abnormal and Heel to Geronimo Test abnormal.      Comments: Patient has bilateral upper extremity drift  that was stuttering, consistent with negative myoclonus     Neurological Exam:   LOC: drowsy and responsive to voice  Attention Span: poor  Language: Expressive aphasia, Receptive aphasia, Naming impaired  Articulation: Dysarthria  Orientation: Not oriented to person, place, and time  Visual Fields: Full  EOM (CN III, IV, VI): Full/intact  Pupils (CN II, III): PERRL  Facial Sensation (CN V): Normal  Facial Movement (CN VII): Lower facial weakness on the Right  Reflexes: did not test  Motor: 5/5 to the left, 4/5 on the right (baseline), bilateral upper extremity drift  Cerebellum: Axia/Trunk Instability/Ataxia   Sensation: Intact to light touch, temperature and vibration  Tone: Normal tone throughout    Laboratory:  CMP:   Recent Labs   Lab 11/30/22  0415   CALCIUM 9.0   ALBUMIN 3.3*   PROT 7.0      K 3.5   CO2 30*      BUN 27*   CREATININE 1.0   ALKPHOS 73   ALT 23   AST 32   BILITOT 1.0     CBC:   Recent Labs   Lab 11/30/22  1521   WBC 9.86   RBC 4.98   HGB 14.8   HCT 47.2      MCV 95   MCH 29.7   MCHC 31.4*     Lipid Panel:   Recent Labs   Lab 11/30/22  0415   CHOL 98*   LDLCALC 48.4*   HDL 41   TRIG 43     Coagulation: No results for input(s): PT, INR, APTT in the last 168 hours.  Hgb A1C:   Recent Labs   Lab 11/30/22  0415   HGBA1C 5.1     TSH:   Recent Labs   Lab 11/29/22  2328   TSH 1.123     Brain and vessel imaging:  Brain:   Mild blood products are associated with a recent small left occipital deep white matter infarct much of which may be petechial in nature however continued follow-up is recommended (the mild blood products are new from the CT study dated 11/26/2022).  No mass effect or new territorial infarct.   No significant stenosis at the carotid bifurcations by NASCET criteria.  The vertebral arteries are patent without advanced stenosis.   Intracranially there is moderate to severe stenosis of the right cavernous/supraclinoid segment (similar to 2021) with mild narrowing on the  left.  No major branch stenosis/occlusion is identified at the [id number].   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    Cardiac Evaluation:   The left ventricle is mildly enlarged with concentric hypertrophy and severely decreased systolic function.  Severe left atrial enlargement.  Grade III left ventricular diastolic dysfunction.  There is no evidence of intracardiac shunting.  Mild right ventricular enlargement with mildly reduced right ventricular systolic function.  Right atrial enlargement.  Mild-to-moderate mitral regurgitation.  Mild tricuspid regurgitation.  There is moderate pulmonary hypertension.

## 2022-12-01 PROBLEM — R53.81 DEBILITY: Status: ACTIVE | Noted: 2020-11-20

## 2022-12-01 PROBLEM — Z71.89 GOALS OF CARE, COUNSELING/DISCUSSION: Status: ACTIVE | Noted: 2022-12-01

## 2022-12-01 LAB
ALBUMIN SERPL BCP-MCNC: 3 G/DL (ref 3.5–5.2)
ALP SERPL-CCNC: 62 U/L (ref 55–135)
ALT SERPL W/O P-5'-P-CCNC: 20 U/L (ref 10–44)
ANION GAP SERPL CALC-SCNC: 7 MMOL/L (ref 8–16)
AST SERPL-CCNC: 28 U/L (ref 10–40)
BASOPHILS # BLD AUTO: 0.06 K/UL (ref 0–0.2)
BASOPHILS NFR BLD: 0.6 % (ref 0–1.9)
BILIRUB SERPL-MCNC: 1.7 MG/DL (ref 0.1–1)
BUN SERPL-MCNC: 20 MG/DL (ref 8–23)
CALCIUM SERPL-MCNC: 8.7 MG/DL (ref 8.7–10.5)
CHLORIDE SERPL-SCNC: 97 MMOL/L (ref 95–110)
CO2 SERPL-SCNC: 35 MMOL/L (ref 23–29)
CREAT SERPL-MCNC: 0.9 MG/DL (ref 0.5–1.4)
DIFFERENTIAL METHOD: ABNORMAL
EOSINOPHIL # BLD AUTO: 0.1 K/UL (ref 0–0.5)
EOSINOPHIL NFR BLD: 0.9 % (ref 0–8)
ERYTHROCYTE [DISTWIDTH] IN BLOOD BY AUTOMATED COUNT: 12.1 % (ref 11.5–14.5)
EST. GFR  (NO RACE VARIABLE): >60 ML/MIN/1.73 M^2
GLUCOSE SERPL-MCNC: 84 MG/DL (ref 70–110)
HCT VFR BLD AUTO: 40 % (ref 40–54)
HGB BLD-MCNC: 12.8 G/DL (ref 14–18)
IMM GRANULOCYTES # BLD AUTO: 0.02 K/UL (ref 0–0.04)
IMM GRANULOCYTES NFR BLD AUTO: 0.2 % (ref 0–0.5)
LYMPHOCYTES # BLD AUTO: 1.4 K/UL (ref 1–4.8)
LYMPHOCYTES NFR BLD: 13.8 % (ref 18–48)
MCH RBC QN AUTO: 30.5 PG (ref 27–31)
MCHC RBC AUTO-ENTMCNC: 32 G/DL (ref 32–36)
MCV RBC AUTO: 95 FL (ref 82–98)
MONOCYTES # BLD AUTO: 1.2 K/UL (ref 0.3–1)
MONOCYTES NFR BLD: 11.8 % (ref 4–15)
NEUTROPHILS # BLD AUTO: 7.4 K/UL (ref 1.8–7.7)
NEUTROPHILS NFR BLD: 72.7 % (ref 38–73)
NRBC BLD-RTO: 0 /100 WBC
PLATELET # BLD AUTO: 230 K/UL (ref 150–450)
PMV BLD AUTO: 10.7 FL (ref 9.2–12.9)
POTASSIUM SERPL-SCNC: 3.8 MMOL/L (ref 3.5–5.1)
PROT SERPL-MCNC: 6.2 G/DL (ref 6–8.4)
PYRIDOXAL SERPL-MCNC: 14 UG/L (ref 5–50)
RBC # BLD AUTO: 4.2 M/UL (ref 4.6–6.2)
SODIUM SERPL-SCNC: 139 MMOL/L (ref 136–145)
VIT B1 BLD-MCNC: 85 UG/L (ref 38–122)
WBC # BLD AUTO: 10.14 K/UL (ref 3.9–12.7)

## 2022-12-01 PROCEDURE — 36415 COLL VENOUS BLD VENIPUNCTURE: CPT

## 2022-12-01 PROCEDURE — 92523 SPEECH SOUND LANG COMPREHEN: CPT

## 2022-12-01 PROCEDURE — 99233 SBSQ HOSP IP/OBS HIGH 50: CPT | Mod: GC,,, | Performed by: HOSPITALIST

## 2022-12-01 PROCEDURE — 97535 SELF CARE MNGMENT TRAINING: CPT

## 2022-12-01 PROCEDURE — 25000003 PHARM REV CODE 250: Performed by: STUDENT IN AN ORGANIZED HEALTH CARE EDUCATION/TRAINING PROGRAM

## 2022-12-01 PROCEDURE — 97162 PT EVAL MOD COMPLEX 30 MIN: CPT

## 2022-12-01 PROCEDURE — 63600175 PHARM REV CODE 636 W HCPCS: Performed by: HOSPITALIST

## 2022-12-01 PROCEDURE — 25000003 PHARM REV CODE 250

## 2022-12-01 PROCEDURE — 97530 THERAPEUTIC ACTIVITIES: CPT

## 2022-12-01 PROCEDURE — 85025 COMPLETE CBC W/AUTO DIFF WBC: CPT

## 2022-12-01 PROCEDURE — 63600175 PHARM REV CODE 636 W HCPCS

## 2022-12-01 PROCEDURE — 99233 PR SUBSEQUENT HOSPITAL CARE,LEVL III: ICD-10-PCS | Mod: GC,,, | Performed by: HOSPITALIST

## 2022-12-01 PROCEDURE — 20600001 HC STEP DOWN PRIVATE ROOM

## 2022-12-01 PROCEDURE — 80053 COMPREHEN METABOLIC PANEL: CPT

## 2022-12-01 RX ORDER — NITROGLYCERIN 0.4 MG/1
0.4 TABLET SUBLINGUAL EVERY 5 MIN PRN
Qty: 25 TABLET | Refills: 0 | Status: SHIPPED | OUTPATIENT
Start: 2022-12-01 | End: 2023-12-22 | Stop reason: SDUPTHER

## 2022-12-01 RX ORDER — DIGOXIN 125 MCG
0.25 TABLET ORAL ONCE
Status: COMPLETED | OUTPATIENT
Start: 2022-12-01 | End: 2022-12-01

## 2022-12-01 RX ORDER — DIGOXIN 125 MCG
0.12 TABLET ORAL DAILY
Status: DISCONTINUED | OUTPATIENT
Start: 2022-12-02 | End: 2022-12-01

## 2022-12-01 RX ORDER — ASPIRIN 81 MG/1
81 TABLET ORAL DAILY
Qty: 90 TABLET | Refills: 3
Start: 2022-12-01 | End: 2023-12-01

## 2022-12-01 RX ORDER — SACUBITRIL AND VALSARTAN 24; 26 MG/1; MG/1
1 TABLET, FILM COATED ORAL 2 TIMES DAILY
Qty: 60 TABLET | Refills: 2 | Status: SHIPPED | OUTPATIENT
Start: 2022-12-01 | End: 2023-10-23 | Stop reason: SDUPTHER

## 2022-12-01 RX ORDER — DIGOXIN 125 MCG
0.25 TABLET ORAL ONCE
Status: DISCONTINUED | OUTPATIENT
Start: 2022-12-01 | End: 2022-12-01

## 2022-12-01 RX ORDER — DIGOXIN 125 MCG
0.12 TABLET ORAL DAILY
Status: DISCONTINUED | OUTPATIENT
Start: 2022-12-02 | End: 2022-12-05 | Stop reason: HOSPADM

## 2022-12-01 RX ADMIN — ASPIRIN 81 MG: 81 TABLET, COATED ORAL at 08:12

## 2022-12-01 RX ADMIN — TICAGRELOR 90 MG: 90 TABLET ORAL at 08:12

## 2022-12-01 RX ADMIN — SENNOSIDES AND DOCUSATE SODIUM 1 TABLET: 50; 8.6 TABLET ORAL at 08:12

## 2022-12-01 RX ADMIN — ATORVASTATIN CALCIUM 80 MG: 40 TABLET, FILM COATED ORAL at 08:12

## 2022-12-01 RX ADMIN — SENNOSIDES AND DOCUSATE SODIUM 1 TABLET: 50; 8.6 TABLET ORAL at 09:12

## 2022-12-01 RX ADMIN — LOSARTAN POTASSIUM 25 MG: 25 TABLET, FILM COATED ORAL at 08:12

## 2022-12-01 RX ADMIN — FUROSEMIDE 40 MG: 10 INJECTION, SOLUTION INTRAVENOUS at 09:12

## 2022-12-01 RX ADMIN — ACETAMINOPHEN 650 MG: 325 TABLET ORAL at 09:12

## 2022-12-01 RX ADMIN — Medication 6 MG: at 08:12

## 2022-12-01 RX ADMIN — ENOXAPARIN SODIUM 60 MG: 100 INJECTION SUBCUTANEOUS at 03:12

## 2022-12-01 RX ADMIN — POLYETHYLENE GLYCOL 3350 17 G: 17 POWDER, FOR SOLUTION ORAL at 09:12

## 2022-12-01 RX ADMIN — DIGOXIN 0.25 MG: 125 TABLET ORAL at 06:12

## 2022-12-01 RX ADMIN — ENOXAPARIN SODIUM 60 MG: 100 INJECTION SUBCUTANEOUS at 06:12

## 2022-12-01 NOTE — ASSESSMENT & PLAN NOTE
-- CODE STROKE called on 11/30, CTH showed old retained blood products from recent stroke. No new intervention at this time.   --Continue Lovenox for 72 hours  -- ASA and brilinta  -- Vascular neurology has seen the patient, no new intervention recommended, continue Lovenox, asa, brilinta

## 2022-12-01 NOTE — PT/OT/SLP EVAL
Speech Language Pathology Evaluation  Cognitive Communication    Patient Name:  Gregorio Bishop   MRN:  [id number]  Admitting Diagnosis: NSTEMI (non-ST elevated myocardial infarction)    Recommendations:     Recommendations:                General Recommendations:  Dysphagia therapy and Cognitive-linguistic therapy  Diet recommendations:  Mechanical soft, Thin   Aspiration Precautions: 1 bite/sip at a time, Assistance with meals, Eliminate distractions, Feed only when awake/alert, HOB to 90 degrees, Meds whole 1 at a time (split in half or bury in pudding as needed), Small bites/sips, and Standard aspiration precautions   General Precautions: Standard, aspiration, fall  Communication strategies:  provide increased time to answer    History:     Past Medical History:   Diagnosis Date    Hypertension     Stroke        No past surgical history on file.    Social History: Patient lives with spouse and two sons in [place].    Occupation/hobbies/homemaking: Patient sewed [employer] as a hobby.    Subjective     Patient awake and alert  Communicated with nurse prior to session    Pain/Comfort:  Pain Rating 1: 0/10  Pain Rating Post-Intervention 1: 0/10    Respiratory Status: Room air    Objective:   Cognitive Status:    Arousal/Alertness Appropriate response to stimuli  Attention Sustained attention deficit present requiring occasional redirection  Orientation Person, Place, and Situation  Memory DelayedRecall: 0/2 with a 5-minute delay and mod assist and recall general information 4/5 on names of family members with mod assist  Problem Solving Conclusions 2/2 with max assist and Solutions 1/2 with mod assist       Receptive Language:   Comprehension:      Questions Simple yes/no WFL  Complex yes/no 3/4 with mod assist  Conversation WFL    Pragmatics:    WFL    Expressive Language:  Verbal:    Naming Confrontation WFL and Convergent WFL  Sentence formulation WFL      Motor Speech:  WFL during this  session, but daughter reported intermittent slurred speech which she attributed to fatigue    Voice:   WFL    Visual-Spatial:  WFL    Reading:   TBA      Treatment: Patient sitting up in chair with family present. Patient's family reported good appetite and no overt difficulties with current diet. Patient's daughter noted that confusion/dysarthria were waxing/waning and that patient was currently near baseline. SLP educated patient and family regarding recs. Patient left in bed with call light within reach.     Assessment:   Gregorio Bishop is a 82 y.o. male with an SLP diagnosis of Dysphagia and Cognitive-Linguistic Impairment. Further ST services warranted 2' to reported waxing/waning ST deficits.     Goals:   Multidisciplinary Problems       SLP Goals          Problem: SLP    Goal Priority Disciplines Outcome   SLP Goal     SLP Ongoing, Progressing   Description: Speech-Language Pathology Goals  Goals to be met by 12/7/22  1. Patient will participate in ongoing swallow assessment in order to determine safest least restrictive diet.   2. Patient will participate in a speech/language/cog eval (met & ongoing).   3. Patient will recall personal information with 75% accuracy and min assist.   4. Patient will answer problem solving/safety awareness questions with 75% accuracy and min assist.                          Plan:   Patient to be seen:  3 x/week   Plan of Care expires:  12/30/22  Plan of Care reviewed with:  patient, daughter, grandchild(wesly)   SLP Follow-Up:  Yes       Discharge recommendations:  Discharge Facility/Level of Care Needs: rehabilitation facility   Barriers to Discharge:  None    Time Tracking:   SLP Treatment Date:   12/01/22  Speech Start Time:  1253  Speech Stop Time:  1315     Speech Total Time (min):  22 min    Billable Minutes: Eval 14  and Self Care/Home Management Training 8    Henry Albright CCC-SLP  Speech-Language Pathology  Pager: [phone]   12/01/2022

## 2022-12-01 NOTE — ASSESSMENT & PLAN NOTE
Although there are laboratory (elevated BNP),  imaging abnormalities (cardiomegaly with pulmonary vascular congestion on chest x-ray), and prior sonography (EF 20% with grade 1 diastolic dysfunction), I do not see a clinical picture compelling for acute decompensated heart failure at this time.    Of note, the patient received a 30 cc/kilogram of IV fluid bolus upon arrival to the emergency department.  Shortly after, he was started on IV diuresis.    Repeat echo ordered: grade III dCHFrEF 15%  Decrease IV Lasix frequency to daily  Continue losartan

## 2022-12-01 NOTE — PLAN OF CARE
Problem: Physical Therapy  Goal: Physical Therapy Goal  Description: Goals to be met by: 12/15/22    Patient will increase functional independence with mobility by performin. Supine to sit with modified independence  2. Sit to supine with modified independence  3. Sit to stand transfer with modified independence  4. Bed to chair transfer with modified independence using LRAD as needed  5. Gait  x 200 feet with supervision using LRAD as needed  6. Ascend/descend 15 stair with bilateral handrails supervision using LRAD as needed  7. Lower extremity exercise program x15 reps per handout, with independence    Outcome: Ongoing, Progressing     Pt tolerated PT session well.      All needs met, all questions answered.

## 2022-12-01 NOTE — ASSESSMENT & PLAN NOTE
Previous CVA  Presenting from rehabilitation center. No signs of new CVA. Per family, at new baseline functionality.    -- Fall precaution  -- dysphagia diet

## 2022-12-01 NOTE — ASSESSMENT & PLAN NOTE
Spoke with family about code status with patient. After discussion, it was decided to make pt DNR to align with his wishes.

## 2022-12-01 NOTE — DISCHARGE SUMMARY
[facility] ([address] Medicine  Discharge Summary      Patient Name: Gregorio Bishop  MRN: [id number]  [facility]: [id number]  Patient Class: OP- Observation  Admission Date: 11/26/2022  Hospital Length of Stay: 0 days  Discharge Date and Time: 11/28/2022  5:54 PM  Attending Physician: No att. providers found   Discharging Provider: Bernice Abdi PA-C  Primary Care Provider: Renaldo Molina MD    Primary Care Team: Networked reference to record PCT     HPI:   Mr Perkins is an 82-year-old male with a past medical history of hypertension, heart failure, and CVA.  He came to emergency department today via EMS after his family found him to be more lethargic, weak and with some confusion today.  No family at bedside.  Per chart, he had a stroke in 2021 and has right-sided weakness.  On exam patient is oriented to self, place, and month.  He is able to respond appropriately and follow commands.  Patient reports some increased weakness and decreased sensation on the right side along with some slowing of his speech.  The right-sided weakness and decreased sensation has been ongoing for multiple months and speech changes began 3 days ago.  No dysarthria or aphasia on exam.  Patient did become tearful because he is unable to get up and walk.  In the ED patient was afebrile with a white blood cell count of 9.9.  Lactic acid 1.4 with a repeat of 2.1.  Chest x-ray reports small bilateral pleural effusions suggestive of heart failure with a BNP of 2854.  Patient had a troponin of 0.078 with a repeat pending.  Patient had a CT of his head which showed remote infarcts.  ED physician spoke with Dr. Linares with the neurology team who recommended an MRI there is no improvement tomorrow.  Patient received lactated Ringer's under septic protocol.  Lasix 20 mg IV was given also.  Patient admitted to hospital medicine for further management.      * No surgery found *      Hospital Course:   82-year-old man with prior CVA  in 3 weeks progressive weakness who was brought to the emergency department by his children for reports of confusion.  The patient was alert and oriented to person place and time the day of admission.  He was started on treatment for decompensated heart failure, but appeared comfortable and not in florid CHF exacerbation the following morning.  They reports that Neurology was consulted from the emergency department and recommended MRI of the brain if there is no appreciable improvement in his condition. MRI brain with subacute infarcts, no acute hemorrhage. Stable and at baseline time of discharge, discharge to rehab with return precautions discussed, no further questions at dc       Goals of Care Treatment Preferences:  Code Status: DNR      Consults:   Consults (From admission, onward)        Status Ordering Provider     Inpatient consult to Social Work/Case Management  Once        Provider:  (Not yet assigned)    Completed DEJAN ADAMS     Inpatient consult to Registered Dietitian/Nutritionist  Once        Provider:  (Not yet assigned)    Completed DEJAN ADAMS          * Debility  There were initial reports of mental status decline concerning for CVA.  No new neurologic deficit was identified.  CT of the head showed encephalomalacia consistent with prior CVA.  Patient is conversant and and oriented.  Exam is nonfocal.  Repeat lipid panel resulted.  TSH within normal limits.    MRI brain without contrast to evaluate for acute/subacute infarction   PT and OT following: recommending rehab, pt agreeable  Echo with bubble study ordered: similar to priors, grade III DDfx rEF 15% (EF ~20% in 2021); no intracardiac shunting  Continue aspirin and statin  At baseline time of d/c, stable to transfer to [facility]      Chronic combined systolic and diastolic heart failure  Although there are laboratory (elevated BNP),  imaging abnormalities (cardiomegaly with pulmonary vascular congestion on chest x-ray),  and prior sonography (EF 20% with grade 1 diastolic dysfunction), I do not see a clinical picture compelling for acute decompensated heart failure at this time.    Of note, the patient received a 30 cc/kilogram of IV fluid bolus upon arrival to the emergency department.  Shortly after, he was started on IV diuresis.    Repeat echo ordered: grade III dCHFrEF 15%  Decrease IV Lasix frequency to daily  Continue losartan      Final Active Diagnoses:    Diagnosis Date Noted POA    PRINCIPAL PROBLEM:  Debility [R53.81] 11/20/2020 Yes    Chronic combined systolic and diastolic heart failure [I50.42] 11/26/2022 Yes    Essential hypertension [I10] 11/20/2020 Yes    Elevated troponin [R77.8] 11/19/2020 Yes      Problems Resolved During this Admission:       Discharged Condition: stable    Disposition: Rehab Facility    Follow Up:    Patient Instructions:      Ambulatory referral/consult to Vascular Neurology   Standing Status: Future   Referral Priority: Routine Referral Type: Consultation   Referral Reason: Specialty Services Required   Requested Specialty: Vascular Neurology   Number of Visits Requested: 1     Call MD for:  temperature >100.4     Call MD for:  persistent nausea and vomiting or diarrhea     Call MD for:  severe uncontrolled pain     Call MD for:  redness, tenderness, or signs of infection (pain, swelling, redness, odor or green/yellow discharge around incision site)     Call MD for:  difficulty breathing or increased cough     Call MD for:  severe persistent headache     Call MD for:  worsening rash     Call MD for:  persistent dizziness, light-headedness, or visual disturbances     Call MD for:  increased confusion or weakness       Significant Diagnostic Studies: Radiology: MRI: MRI without contrast demonstrates extensive white matter signal changes consistent with microvascular disease.  There are remote cortical infarcts in the frontal lobes bilaterally, larger on the left side where it extends  posteriorly into the anterior parasagittal parietal lobe.     Cortical methemoglobin staining is present in the left temporal lobe cortex and left posterior frontal precentral cortex with T2 shine through on diffusion, and these areas may represent more recent subacute infarcts.  No acute hemorrhage is seen.    Pending Diagnostic Studies:     Procedure Component Value Units Date/Time    Vitamin B6 [[id number]] Collected: 11/27/22 0544    Order Status: Sent Lab Status: In process Updated: 11/27/22 0823    Specimen: Blood          Medications:  Reconciled Home Medications:      Medication List      START taking these medications    furosemide 20 MG tablet  Commonly known as: LASIX  Take 1 tablet (20 mg total) by mouth once daily.        CONTINUE taking these medications    acetaminophen 325 MG tablet  Commonly known as: TYLENOL  Take 650 mg by mouth every 8 (eight) hours as needed.     atorvastatin 80 MG tablet  Commonly known as: LIPITOR  Take 1 tablet (80 mg total) by mouth every evening.     cyanocobalamin 1000 MCG tablet  Commonly known as: VITAMIN B-12  Take 1 tablet (1,000 mcg total) by mouth once daily.     losartan 25 MG tablet  Commonly known as: COZAAR  Take 1 tablet (25 mg total) by mouth once daily.            Indwelling Lines/Drains at time of discharge:   Lines/Drains/Airways     None                 Time spent on the discharge of patient: >35 minutes         Bernice Abdi PA-C  Department of Hospital Medicine  [facility] ([address])

## 2022-12-01 NOTE — PLAN OF CARE
12/01/22 1438   Post-Acute Status   Post-Acute Authorization Placement   Post-Acute Placement Status Referrals Sent   Referral sent to [facility].

## 2022-12-01 NOTE — ASSESSMENT & PLAN NOTE
There were initial reports of mental status decline concerning for CVA.  No new neurologic deficit was identified.  CT of the head showed encephalomalacia consistent with prior CVA.  Patient is conversant and and oriented.  Exam is nonfocal.  Repeat lipid panel resulted.  TSH within normal limits.    MRI brain without contrast to evaluate for acute/subacute infarction   PT and OT following: recommending rehab, pt agreeable  Echo with bubble study ordered: similar to priors, grade III DDfx rEF 15% (EF ~20% in 2021); no intracardiac shunting  Continue aspirin and statin  At baseline time of d/c, stable to transfer to [facility]

## 2022-12-01 NOTE — NURSING
Pt currently eating food.  Alert and able to answer person/place/time correctly.  Family at bedside.

## 2022-12-01 NOTE — ASSESSMENT & PLAN NOTE
Symptoms: 2-3 weeks of progressive SHORE, dyspnea at rest, orthopnea. Previously admitted 11/26 for ADHF, treated with diuretics.  TTE (11/2022) EF 15%, G3LVDD, decreased from study 09/2022 25%, G1LVDD.  Home meds: losartan, furosemide 20 qd  Dry weight: Unknown  Diagnostics: CXR: pulmonary edema; BNP 2574; Lactate 1.2; O2 requirement RA SpO2 high 90s    Initial etiology: Possibly ischemic cardiomyopathy s/o prev CVA and athersclerotic disease. Patient was not told he had CHF before, family at bedside has not heart CHF diagnosis before either.  Decompensation etiology: Unclear if new ischemic event is causing further CHF decompensation or if CHF decompensation d/t suboptimal medication regimen, diet noncompliance etc causing NSTEMI d/t demand mismatch.      -- Lasix 40 IV BID   -- Daily weights (standing if tolerated)  -- Strict I/Os; goal net negative 1.5 - 2 L / day  -- Fluid restriction at 1500mL  -- Cardiac diet w/ 2 g Na restriction when diet tolerated  -- cardiology consulted, recommended digoxin 0.25mg qd after load

## 2022-12-01 NOTE — ASSESSMENT & PLAN NOTE
Symptoms: Presented with substernal, non-radiating chest pain, most associated with exertion. Also with associated dyspnea on exertion (and now at rest). No NTG was attempted.  Risk factors: Likely previous atherosclerotic disease (h/o CVA).   Diagnostics: EKG showed sinus rhythm with PVCs, non-specific T wave abnormalities; no over ischemic changes  Troponin uptrending prior to admission: 0.065 > 0.087 > 1.321, BNP 2574  BEBE 3 or 4  A/P: Unclear if new ischemic event is causing further CHF decompensation or if CHF decompensation d/t suboptimal medication regimen, diet noncompliance etc causing NSTEMI d/t demand mismatch.      -- DAPT load (ASA, Brillinta) and maintenance thereafter  -- Full AC with Lovenox for 72 hours  -- Trend trop to peak  -- Statin: atorvastatin 80 qd  -- B-blocker: n/a, start on discharge  -- ACEi/ARB: losartan 25  -- NTG PRN for chest pain  -- EKG STAT for new chest pain or hemodynamic instability  -- Cardiology consulted, not a good candidate for PCI, treating medically with Lovenox, ASA, and brillinta

## 2022-12-01 NOTE — PROGRESS NOTES
[facility] - Cardiology [facility] Medicine  Progress Note    Patient Name: Gregorio Bsihop  MRN: [id number]  Patient Class: IP- Inpatient   Admission Date: 11/29/2022  Length of Stay: 1 days  Attending Physician: Nicole Jin MD  Primary Care Provider: Renaldo Molina MD        Subjective:     Principal Problem:NSTEMI (non-ST elevated myocardial infarction)        HPI:  81 yo M PMHx CHF, CVA with RLE deficits presents from rehabilitation center [place] [facility] due to 2 week history of progressive SHORE and associated chest pressure in the substernal region without radiation.  Patient denies any overt feeling of pain, instead feeling chest fullness and chest pressure, mostly when attempting to ambulate at the rehabilitation center.  Patient has also been describing increased dyspnea both on exertion and at rest.  Has difficulty feeling like he gets a full breath in.  Denies abdominal pain, jaw pain, arm pain, nausea, vomiting, diarrhea, fever, chills, cough, sick contacts.  Patient is unsure of what medications he takes.  Patient describes his diet as whatever he feels like eating.  Denies smoking history, reason alcohol use, illicit drugs.  Patient was previously admitted for treatment of congestive heart failure 11/26.    Patient admitted to Hospital Medicine for further evaluation of elevated troponin, shortness of breath without hypoxemia, concerns for NSTEMI.      Overview/Hospital Course:  No notes on file    Interval History: NAEON. Mentation has improved since yesterday. CTH showed no acute events but rather retained blood products from most recent stroke. Started digoxin, tolerating well. Pt reported mild dizziness when he sat up.  Discussed plan with family. Will stop Lovenox after 72 hours.     Review of Systems   Constitutional:  Negative for chills and fever.   HENT:  Negative for rhinorrhea and sore throat.    Respiratory:  Negative for cough, shortness of breath and wheezing.    Cardiovascular:  Negative  for chest pain and leg swelling.   Gastrointestinal:  Negative for abdominal pain, blood in stool, constipation, diarrhea, nausea and vomiting.   Genitourinary:  Negative for dysuria and hematuria.   Skin:  Negative for rash.   Neurological:  Positive for dizziness (mild). Negative for light-headedness and headaches.   All other systems reviewed and are negative.  Objective:     Vital Signs (Most Recent):  Temp: 98.1 °F (36.7 °C) (12/01/22 1106)  Pulse: 76 (12/01/22 1424)  Resp: 18 (12/01/22 1424)  BP: 124/77 (12/01/22 1424)  SpO2: 99 % (12/01/22 1424) Vital Signs (24h Range):  Temp:  [97.4 °F (36.3 °C)-98.4 °F (36.9 °C)] 98.1 °F (36.7 °C)  Pulse:  [67-94] 76  Resp:  [18] 18  SpO2:  [97 %-99 %] 99 %  BP: (109-134)/(67-85) 124/77     Weight: 60.2 kg (132 lb 11.5 oz)  Body mass index is 22.77 kg/m².    Intake/Output Summary (Last 24 hours) at 12/1/2022 1443  Last data filed at 12/1/2022 1419  Gross per 24 hour   Intake 100 ml   Output 2000 ml   Net -1900 ml      Physical Exam  Vitals and nursing note reviewed.   Constitutional:       General: He is not in acute distress.     Comments: Elderly;  Frail;   HENT:      Head: Normocephalic and atraumatic.   Eyes:      Extraocular Movements: Extraocular movements intact.      Pupils: Pupils are equal, round, and reactive to light.   Cardiovascular:      Rate and Rhythm: Normal rate and regular rhythm.      Pulses: Normal pulses.   Pulmonary:      Effort: Pulmonary effort is normal. No respiratory distress.      Breath sounds: Normal breath sounds.   Abdominal:      General: Abdomen is flat.      Palpations: Abdomen is soft.      Tenderness: There is no abdominal tenderness.   Musculoskeletal:      Right lower leg: No edema.      Left lower leg: No edema.   Skin:     General: Skin is warm and dry.   Neurological:      Mental Status: He is alert.       Significant Labs: All pertinent labs within the past 24 hours have been reviewed.  BMP:   Recent Labs   Lab 11/29/22  9740  11/30/22  0415 12/01/22  0600   *   < > 84      < > 139   K 4.1   < > 3.8      < > 97   CO2 28   < > 35*   BUN 30*   < > 20   CREATININE 1.0   < > 0.9   CALCIUM 8.8   < > 8.7   MG 1.9  --   --     < > = values in this interval not displayed.     CBC:   Recent Labs   Lab 11/30/22  1521 11/30/22  1734 12/01/22  0600   WBC 9.86 9.76 10.14   HGB 14.8 14.1 12.8*   HCT 47.2 43.1 40.0    245 230       Significant Imaging: I have reviewed all pertinent imaging results/findings within the past 24 hours.      Assessment/Plan:      * NSTEMI (non-ST elevated myocardial infarction)  Symptoms: Presented with substernal, non-radiating chest pain, most associated with exertion. Also with associated dyspnea on exertion (and now at rest). No NTG was attempted.  Risk factors: Likely previous atherosclerotic disease (h/o CVA).   Diagnostics: EKG showed sinus rhythm with PVCs, non-specific T wave abnormalities; no over ischemic changes  Troponin uptrending prior to admission: 0.065 > 0.087 > 1.321, BNP 2574  BEBE 3 or 4  A/P: Unclear if new ischemic event is causing further CHF decompensation or if CHF decompensation d/t suboptimal medication regimen, diet noncompliance etc causing NSTEMI d/t demand mismatch.      -- DAPT load (ASA, Brillinta) and maintenance thereafter  -- Full AC with Lovenox for 72 hours  -- Trend trop to peak  -- Statin: atorvastatin 80 qd  -- B-blocker: n/a, start on discharge  -- ACEi/ARB: losartan 25  -- NTG PRN for chest pain  -- EKG STAT for new chest pain or hemodynamic instability  -- Cardiology consulted, not a good candidate for PCI, treating medically with Lovenox, ASA, and brillinta      Goals of care, counseling/discussion  Spoke with family about code status with patient. After discussion, it was decided to make pt DNR to align with his wishes.       Acute decompensated heart failure  Symptoms: 2-3 weeks of progressive SHOER, dyspnea at rest, orthopnea. Previously admitted 11/26  for ADHF, treated with diuretics.  TTE (11/2022) EF 15%, G3LVDD, decreased from study 09/2022 25%, G1LVDD.  Home meds: losartan, furosemide 20 qd  Dry weight: Unknown  Diagnostics: CXR: pulmonary edema; BNP 2574; Lactate 1.2; O2 requirement RA SpO2 high 90s    Initial etiology: Possibly ischemic cardiomyopathy s/o prev CVA and athersclerotic disease. Patient was not told he had CHF before, family at bedside has not heart CHF diagnosis before either.  Decompensation etiology: Unclear if new ischemic event is causing further CHF decompensation or if CHF decompensation d/t suboptimal medication regimen, diet noncompliance etc causing NSTEMI d/t demand mismatch.      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 No signs of new CVA. Per family, at new baseline functionality.    -- Fall precaution  -- dysphagia diet      Frail elderly  PT/OT consulted  Rehab recommended, referrals sent    Stroke  -- CODE STROKE called on 11/30, CTH showed old retained blood products from recent stroke. No new intervention at this time.   --Continue Lovenox for 72 hours  -- ASA and brilinta  -- Vascular neurology has seen the patient, no new intervention recommended, continue Lovenox, asa, brilinta      VTE Risk Mitigation (From admission, onward)         Ordered     enoxaparin injection 60 mg  Every 12 hours (non-standard times)         11/30/22 0256     Place sequential compression device  Until discontinued         11/30/22 0256                Discharge Planning   RASHAD: 12/3/2022     Code Status: DNR   Is the patient medically ready for discharge?: No    Reason for patient still in hospital (select  all that apply): Treatment  Discharge Plan A: Rehab                  Jerzy Minaya DO  Department of Hospital Medicine   [facility] - Cardiology Stepdown

## 2022-12-01 NOTE — PT/OT/SLP EVAL
Physical Therapy  Evaluation and Treatment    Patient Name:  Gregorio Bishop   MRN:  [id number]    Recommendations:     Discharge Recommendations:  rehabilitation facility   Discharge Equipment Recommendations: other (see comments) (TBD)   Barriers to discharge: decreased functional mobility, fall risk, decreased caregiver support, and inaccessible home    Assessment:     Gregorio Bishop is a 82 y.o. male admitted with a medical diagnosis of NSTEMI (non-ST elevated myocardial infarction).  Pt demonstrates the below listed impairments with decreased tolerance to functional mobility, gait instability, and impaired cognition being the most limiting.  Pt demonstrates fair tolerance to edge of bed mobility and is willing to ambulate small distances in the room.  Pt Noted to have drop foot in his R foot, weakness in his R LE. Family reports the patient has had x2 acute strokes, his original strok was ~1 year ago.  Pt is not safe for home discharge at this time due to patient's status as: a fall risk and cognitively impaired and requires skilled PT.      Impairments and functional limitations:  weakness, impaired endurance, impaired self care skills, impaired functional mobility, gait instability, impaired balance, impaired cognition, decreased coordination, decreased upper extremity function, decreased lower extremity function, decreased safety awareness, impaired cardiopulmonary response to activity.  These deficits affect their roles and responsibilities in which they were able to complete prior to admit.  Rehab Prognosis:   Good ; patient would benefit from acute skilled PT services 4 x/week to address these deficits, improve quality of life, focus on recovery of impairments, provide patient/caregiver education, reduce fall risk, and reach maximum level of function.  Pt is highly  motivated to participated in skilled PT.    Recent Surgery:   * No surgery found *      Plan:     During this hospitalization, patient to be  seen 4 x/week to address the identified rehab impairments via gait training, therapeutic activities, therapeutic exercises, neuromuscular re-education and progress toward the following goals:    Plan of Care Expires:  12/31/22    Subjective     Chief Complaint: decreased tolerance to functional mobility  Patient/Family Comments/Goals: Progress to inpatient rehab  Pain/Comfort:  Pain Rating 1: 0/10    Patients cultural, spiritual, Oriental orthodox conflicts given the current situation: no    Living Environment:  Patient lives with their spouse and two sons who are both medically compromised  in single story home, 15 steps with Bilateral hand rail, tub/shower.   Pt utilizes RW for ambulation of home distances.    Prior to admission, patient required assist with some ADLs.   DME owned: walker, rolling, bedside commode, shower chair  DME not currently used: R AFO.   Upon discharge, patient will have assistance from family with 24/7 assist.     Objective:     Communicated with nursing prior to session.  Patient found HOB elevated with telemetry, peripheral IV, oxygen, Condom Catheter  upon PT entry to room.    General Precautions: Standard, aspiration, fall   Orthopedic Precautions:N/A   Braces: N/A   Oxygen Device:      Exams:  Cognitive Exam:  Patient is oriented to Person, Place, Time, and Situation (year not month)  Command following: Patient follows 100% of commands   RLE ROM: WFL  RLE Strength: Hip flex.         4/5  Knee ext.       4+/5   Ankle plan.   4+/5  Ankle dorsi.   1/5  LLE ROM: WFL  LLE Strength: Hip flex.         4+/5  Knee ext.       5/5   Ankle plan.   5/5  Ankle dorsi.   4/5  Postural Exam:  Patient presented with the following abnormalities:    -       Rounded shoulders  -       Forward head  Sensation:    -       Intact    Functional Mobility:  Bed Mobility:  Rolling Left: SBA  Scooting: SBA  Supine to Sit: SBA  Head of bed position: HOB elevated  EOB with SBA -CGA     Transfers:  Sit to Stand: CGA with  RW and with cues for hand placement and foot placement  Bed to Chair: Min A with RW and with cues for hand placement and foot placement using Step Transfer to Left   Pt performs x2 sit to stands in all     Gait: Patient ambulated 3' forward/back with RW and Min A. Patient demonstrates occasional unsteady gait, decreased step length, narrow base of support, decreased weight shift, flexed posture, decreased france, and drop foot R. All lines remained intact throughout ambulation trial.  Pt faitgues   SpO2 94-97% on 1L as well as RA    Balance:   Position Score Time   Static Sitting FAIR+: Takes MINIMAL challenges n/a   Dynamic Sitting FAIR-: Maintains without assist but is inconsistent n/a   Static Standing FAIR-: Maintains without assist but is inconsistent n/a   Dynamic Standing POOR+: MINIMAL assist to maintain n/a       Therapeutic Activities:  Patient educated on role of acute care PT and PT POC, safety while in hospital including calling nurse for mobility, call light usage, benefits of out of bed mobility, walker management, breathing technique, fall risk, assistive device use, bed mobility , transfers, gait technique, positioning, posture, risks of prolonged bed rest, possible discharge disposition , and benefits of continued PT by explanation and demonstration.    Patient demonstrates fair understanding of education provided this day.   Whiteboard updated    Therapeutic Exercises:  n/a    AM-PAC 6 CLICK MOBILITY  Total Score:17     Patient left up in chair with all lines intact, call button in reach, RN notified, and family present.    GOALS:   Multidisciplinary Problems       Physical Therapy Goals          Problem: Physical Therapy    Goal Priority Disciplines Outcome Goal Variances Interventions   Physical Therapy Goal     PT, PT/OT Ongoing, Progressing     Description: Goals to be met by: 12/15/22    Patient will increase functional independence with mobility by performin. Supine to sit with  modified independence  2. Sit to supine with modified independence  3. Sit to stand transfer with modified independence  4. Bed to chair transfer with modified independence using LRAD as needed  5. Gait  x 200 feet with supervision using LRAD as needed  6. Ascend/descend 15 stair with bilateral handrails supervision using LRAD as needed  7. Lower extremity exercise program x15 reps per handout, with independence                         History:     Past Medical History:   Diagnosis Date    Hypertension     Stroke        No past surgical history on file.    Time Tracking:     PT Received On: 12/01/22  PT Start Time: 1119     PT Stop Time: 1149  PT Total Time (min): 30 min     Billable Minutes: Evaluation 12 and Therapeutic Activity 18    12/01/2022

## 2022-12-01 NOTE — SUBJECTIVE & OBJECTIVE
Interval History: NAEON. Mentation has improved since yesterday. CTH showed no acute events but rather retained blood products from most recent stroke. Started digoxin, tolerating well. Pt reported mild dizziness when he sat up.  Discussed plan with family. Will stop Lovenox after 72 hours.     Review of Systems   Constitutional:  Negative for chills and fever.   HENT:  Negative for rhinorrhea and sore throat.    Respiratory:  Negative for cough, shortness of breath and wheezing.    Cardiovascular:  Negative for chest pain and leg swelling.   Gastrointestinal:  Negative for abdominal pain, blood in stool, constipation, diarrhea, nausea and vomiting.   Genitourinary:  Negative for dysuria and hematuria.   Skin:  Negative for rash.   Neurological:  Positive for dizziness (mild). Negative for light-headedness and headaches.   All other systems reviewed and are negative.  Objective:     Vital Signs (Most Recent):  Temp: 98.1 °F (36.7 °C) (12/01/22 1106)  Pulse: 76 (12/01/22 1424)  Resp: 18 (12/01/22 1424)  BP: 124/77 (12/01/22 1424)  SpO2: 99 % (12/01/22 1424) Vital Signs (24h Range):  Temp:  [97.4 °F (36.3 °C)-98.4 °F (36.9 °C)] 98.1 °F (36.7 °C)  Pulse:  [67-94] 76  Resp:  [18] 18  SpO2:  [97 %-99 %] 99 %  BP: (109-134)/(67-85) 124/77     Weight: 60.2 kg (132 lb 11.5 oz)  Body mass index is 22.77 kg/m².    Intake/Output Summary (Last 24 hours) at 12/1/2022 1443  Last data filed at 12/1/2022 1419  Gross per 24 hour   Intake 100 ml   Output 2000 ml   Net -1900 ml      Physical Exam  Vitals and nursing note reviewed.   Constitutional:       General: He is not in acute distress.     Comments: Elderly;  Frail;   HENT:      Head: Normocephalic and atraumatic.   Eyes:      Extraocular Movements: Extraocular movements intact.      Pupils: Pupils are equal, round, and reactive to light.   Cardiovascular:      Rate and Rhythm: Normal rate and regular rhythm.      Pulses: Normal pulses.   Pulmonary:      Effort: Pulmonary effort  is normal. No respiratory distress.      Breath sounds: Normal breath sounds.   Abdominal:      General: Abdomen is flat.      Palpations: Abdomen is soft.      Tenderness: There is no abdominal tenderness.   Musculoskeletal:      Right lower leg: No edema.      Left lower leg: No edema.   Skin:     General: Skin is warm and dry.   Neurological:      Mental Status: He is alert.       Significant Labs: All pertinent labs within the past 24 hours have been reviewed.  BMP:   Recent Labs   Lab 11/29/22  2328 11/30/22  0415 12/01/22  0600   *   < > 84      < > 139   K 4.1   < > 3.8      < > 97   CO2 28   < > 35*   BUN 30*   < > 20   CREATININE 1.0   < > 0.9   CALCIUM 8.8   < > 8.7   MG 1.9  --   --     < > = values in this interval not displayed.     CBC:   Recent Labs   Lab 11/30/22  1521 11/30/22  1734 12/01/22  0600   WBC 9.86 9.76 10.14   HGB 14.8 14.1 12.8*   HCT 47.2 43.1 40.0    245 230       Significant Imaging: I have reviewed all pertinent imaging results/findings within the past 24 hours.

## 2022-12-02 LAB
ALBUMIN SERPL BCP-MCNC: 3.3 G/DL (ref 3.5–5.2)
ALP SERPL-CCNC: 79 U/L (ref 55–135)
ALT SERPL W/O P-5'-P-CCNC: 23 U/L (ref 10–44)
ANION GAP SERPL CALC-SCNC: 9 MMOL/L (ref 8–16)
AST SERPL-CCNC: 31 U/L (ref 10–40)
BACTERIA BLD CULT: NORMAL
BACTERIA BLD CULT: NORMAL
BASOPHILS # BLD AUTO: 0.06 K/UL (ref 0–0.2)
BASOPHILS NFR BLD: 0.6 % (ref 0–1.9)
BILIRUB SERPL-MCNC: 1.5 MG/DL (ref 0.1–1)
BUN SERPL-MCNC: 14 MG/DL (ref 8–23)
CALCIUM SERPL-MCNC: 9 MG/DL (ref 8.7–10.5)
CHLORIDE SERPL-SCNC: 98 MMOL/L (ref 95–110)
CO2 SERPL-SCNC: 30 MMOL/L (ref 23–29)
CREAT SERPL-MCNC: 0.8 MG/DL (ref 0.5–1.4)
DIFFERENTIAL METHOD: ABNORMAL
DIGOXIN SERPL-MCNC: 0.5 NG/ML (ref 0.8–2)
EOSINOPHIL # BLD AUTO: 0.1 K/UL (ref 0–0.5)
EOSINOPHIL NFR BLD: 1.2 % (ref 0–8)
ERYTHROCYTE [DISTWIDTH] IN BLOOD BY AUTOMATED COUNT: 11.9 % (ref 11.5–14.5)
EST. GFR  (NO RACE VARIABLE): >60 ML/MIN/1.73 M^2
GLUCOSE SERPL-MCNC: 154 MG/DL (ref 70–110)
HCT VFR BLD AUTO: 43.5 % (ref 40–54)
HGB BLD-MCNC: 13.6 G/DL (ref 14–18)
IMM GRANULOCYTES # BLD AUTO: 0.04 K/UL (ref 0–0.04)
IMM GRANULOCYTES NFR BLD AUTO: 0.4 % (ref 0–0.5)
LYMPHOCYTES # BLD AUTO: 1.2 K/UL (ref 1–4.8)
LYMPHOCYTES NFR BLD: 12.9 % (ref 18–48)
MCH RBC QN AUTO: 30.2 PG (ref 27–31)
MCHC RBC AUTO-ENTMCNC: 31.3 G/DL (ref 32–36)
MCV RBC AUTO: 97 FL (ref 82–98)
MONOCYTES # BLD AUTO: 1.1 K/UL (ref 0.3–1)
MONOCYTES NFR BLD: 11.2 % (ref 4–15)
NEUTROPHILS # BLD AUTO: 6.9 K/UL (ref 1.8–7.7)
NEUTROPHILS NFR BLD: 73.7 % (ref 38–73)
NRBC BLD-RTO: 0 /100 WBC
PLATELET # BLD AUTO: 293 K/UL (ref 150–450)
PMV BLD AUTO: 10.6 FL (ref 9.2–12.9)
POTASSIUM SERPL-SCNC: 3.9 MMOL/L (ref 3.5–5.1)
PROT SERPL-MCNC: 7 G/DL (ref 6–8.4)
RBC # BLD AUTO: 4.5 M/UL (ref 4.6–6.2)
SODIUM SERPL-SCNC: 137 MMOL/L (ref 136–145)
WBC # BLD AUTO: 9.41 K/UL (ref 3.9–12.7)

## 2022-12-02 PROCEDURE — 80053 COMPREHEN METABOLIC PANEL: CPT

## 2022-12-02 PROCEDURE — 25000003 PHARM REV CODE 250: Performed by: STUDENT IN AN ORGANIZED HEALTH CARE EDUCATION/TRAINING PROGRAM

## 2022-12-02 PROCEDURE — 25000003 PHARM REV CODE 250

## 2022-12-02 PROCEDURE — 85025 COMPLETE CBC W/AUTO DIFF WBC: CPT

## 2022-12-02 PROCEDURE — 92526 ORAL FUNCTION THERAPY: CPT

## 2022-12-02 PROCEDURE — 80162 ASSAY OF DIGOXIN TOTAL: CPT

## 2022-12-02 PROCEDURE — 99233 SBSQ HOSP IP/OBS HIGH 50: CPT | Mod: GC,,, | Performed by: HOSPITALIST

## 2022-12-02 PROCEDURE — 99233 PR SUBSEQUENT HOSPITAL CARE,LEVL III: ICD-10-PCS | Mod: GC,,, | Performed by: HOSPITALIST

## 2022-12-02 PROCEDURE — 99222 1ST HOSP IP/OBS MODERATE 55: CPT | Mod: ,,, | Performed by: NURSE PRACTITIONER

## 2022-12-02 PROCEDURE — 36415 COLL VENOUS BLD VENIPUNCTURE: CPT

## 2022-12-02 PROCEDURE — 92507 TX SP LANG VOICE COMM INDIV: CPT

## 2022-12-02 PROCEDURE — 63600175 PHARM REV CODE 636 W HCPCS: Performed by: HOSPITALIST

## 2022-12-02 PROCEDURE — 20600001 HC STEP DOWN PRIVATE ROOM

## 2022-12-02 PROCEDURE — 63600175 PHARM REV CODE 636 W HCPCS

## 2022-12-02 PROCEDURE — 99222 PR INITIAL HOSPITAL CARE,LEVL II: ICD-10-PCS | Mod: ,,, | Performed by: NURSE PRACTITIONER

## 2022-12-02 RX ORDER — TALC
6 POWDER (GRAM) TOPICAL NIGHTLY
Status: DISCONTINUED | OUTPATIENT
Start: 2022-12-02 | End: 2022-12-05 | Stop reason: HOSPADM

## 2022-12-02 RX ORDER — CARVEDILOL 3.12 MG/1
3.12 TABLET ORAL 2 TIMES DAILY
Status: DISCONTINUED | OUTPATIENT
Start: 2022-12-02 | End: 2022-12-05 | Stop reason: HOSPADM

## 2022-12-02 RX ADMIN — POLYETHYLENE GLYCOL 3350 17 G: 17 POWDER, FOR SOLUTION ORAL at 10:12

## 2022-12-02 RX ADMIN — FUROSEMIDE 40 MG: 10 INJECTION, SOLUTION INTRAVENOUS at 10:12

## 2022-12-02 RX ADMIN — SENNOSIDES AND DOCUSATE SODIUM 1 TABLET: 50; 8.6 TABLET ORAL at 10:12

## 2022-12-02 RX ADMIN — ENOXAPARIN SODIUM 60 MG: 100 INJECTION SUBCUTANEOUS at 04:12

## 2022-12-02 RX ADMIN — TRAZODONE HYDROCHLORIDE 25 MG: 50 TABLET ORAL at 09:12

## 2022-12-02 RX ADMIN — TICAGRELOR 90 MG: 90 TABLET ORAL at 10:12

## 2022-12-02 RX ADMIN — DIGOXIN 0.12 MG: 125 TABLET ORAL at 10:12

## 2022-12-02 RX ADMIN — CARVEDILOL 3.12 MG: 3.12 TABLET, FILM COATED ORAL at 09:12

## 2022-12-02 RX ADMIN — LOSARTAN POTASSIUM 25 MG: 25 TABLET, FILM COATED ORAL at 10:12

## 2022-12-02 RX ADMIN — ASPIRIN 81 MG: 81 TABLET, COATED ORAL at 10:12

## 2022-12-02 RX ADMIN — ATORVASTATIN CALCIUM 80 MG: 40 TABLET, FILM COATED ORAL at 09:12

## 2022-12-02 RX ADMIN — TICAGRELOR 90 MG: 90 TABLET ORAL at 09:12

## 2022-12-02 RX ADMIN — SENNOSIDES AND DOCUSATE SODIUM 1 TABLET: 50; 8.6 TABLET ORAL at 09:12

## 2022-12-02 NOTE — NURSING
Patient calling out for family in [place] along with manuel alarming, nurse and tech entered patients room. Patient found with legs out of bed, tech assisted patient to the chair where we reoriented him. Patient still confused, MD aware

## 2022-12-02 NOTE — SUBJECTIVE & OBJECTIVE
Interval History: NAEON. No new complaints this morning. Pt had an episode of delirium this afternoon, was reoriented with family on the phone. Due to difficulty sleeping, will start nightly trazodone. No CP, SOB, and lightheadedness.     Review of Systems  Objective:     Vital Signs (Most Recent):  Temp: 97.9 °F (36.6 °C) (12/02/22 1300)  Pulse: 82 (12/02/22 1511)  Resp: 18 (12/02/22 1300)  BP: 128/72 (12/02/22 1300)  SpO2: 95 % (12/02/22 1300) Vital Signs (24h Range):  Temp:  [97.4 °F (36.3 °C)-98.2 °F (36.8 °C)] 97.9 °F (36.6 °C)  Pulse:  [74-84] 82  Resp:  [16-18] 18  SpO2:  [95 %-99 %] 95 %  BP: (120-142)/(70-78) 128/72     Weight: 60.2 kg (132 lb 11.5 oz)  Body mass index is 22.77 kg/m².    Intake/Output Summary (Last 24 hours) at 12/2/2022 1654  Last data filed at 12/2/2022 0800  Gross per 24 hour   Intake 120 ml   Output 1050 ml   Net -930 ml      Physical Exam    Significant Labs: All pertinent labs within the past 24 hours have been reviewed.  BMP:   Recent Labs   Lab 12/01/22  0600   GLU 84      K 3.8   CL 97   CO2 35*   BUN 20   CREATININE 0.9   CALCIUM 8.7     CBC:   Recent Labs   Lab 11/30/22  1734 12/01/22  0600   WBC 9.76 10.14   HGB 14.1 12.8*   HCT 43.1 40.0    230       Significant Imaging: I have reviewed all pertinent imaging results/findings within the past 24 hours.

## 2022-12-02 NOTE — SUBJECTIVE & OBJECTIVE
Past Medical History:   Diagnosis Date    Hypertension     Stroke      No past surgical history on file.  Review of patient's allergies indicates:  No Known Allergies    Scheduled Medications:    aspirin  81 mg Oral Daily    atorvastatin  80 mg Oral QHS    digoxin  0.125 mg Oral Daily    enoxparin  1 mg/kg Subcutaneous Q12H    furosemide (LASIX) injection  40 mg Intravenous Daily    losartan  25 mg Oral Daily    polyethylene glycol  17 g Oral Daily    senna-docusate 8.6-50 mg  1 tablet Oral BID    ticagrelor  90 mg Oral BID       PRN Medications: acetaminophen, iohexoL, melatonin, nitroGLYCERIN, ondansetron, sodium chloride 0.9%    Family History       Problem Relation (Age of Onset)    Alcohol abuse Father    Hypertension Mother, Paternal Aunt, Paternal Grandmother, Paternal Grandfather    No Known Problems Sister, Daughter, Son          Tobacco Use    Smoking status: Never    Smokeless tobacco: Never   Substance and Sexual Activity    Alcohol use: Not Currently     Comment: no alcohol for 6 months    Drug use: Never    Sexual activity: Not Currently     Review of Systems   Constitutional:  Positive for activity change.   Musculoskeletal:  Positive for gait problem.   Neurological:  Positive for weakness.   Psychiatric/Behavioral:  Positive for confusion.    Objective:     Vital Signs (Most Recent):  Temp: 98.2 °F (36.8 °C) (12/02/22 0735)  Pulse: 78 (12/02/22 0735)  Resp: 16 (12/02/22 0735)  BP: 120/70 (12/02/22 0735)  SpO2: 96 % (12/02/22 0735)    Vital Signs (24h Range):  Temp:  [97.4 °F (36.3 °C)-98.2 °F (36.8 °C)] 98.2 °F (36.8 °C)  Pulse:  [74-85] 78  Resp:  [16-18] 16  SpO2:  [95 %-99 %] 96 %  BP: (109-142)/(67-78) 120/70     Body mass index is 22.77 kg/m².    Physical Exam  Vitals and nursing note reviewed.   HENT:      Nose: Nose normal.      Mouth/Throat:      Comments: Poor dentition  Eyes:      Extraocular Movements: Extraocular movements intact.      Pupils: Pupils are equal, round, and reactive to  light.   Musculoskeletal:         General: Normal range of motion.   Skin:     General: Skin is warm.   Neurological:      Mental Status: He is alert.      Motor: Weakness present.      Gait: Gait abnormal.      Comments: Confusion present  Following commands  Camera sitter at bs     NEUROLOGICAL EXAMINATION:     CRANIAL NERVES     CN III, IV, VI   Pupils are equal, round, and reactive to light.    Diagnostic Results: Labs: Reviewed  ECG: Reviewed  CT: Reviewed

## 2022-12-02 NOTE — PLAN OF CARE
Problem: Adult Inpatient Plan of Care  Goal: Plan of Care Review  12/2/2022 1741 by Fiona Price LPN  Flowsheets (Taken 12/2/2022 1741)  Plan of Care Reviewed With:   patient   spouse   family  12/2/2022 1740 by Fiona Price LPN  Outcome: Ongoing, Progressing     Problem: Adult Inpatient Plan of Care  Goal: Absence of Hospital-Acquired Illness or Injury  Intervention: Prevent Skin Injury  Flowsheets (Taken 12/2/2022 1741)  Body Position: side-lying  Skin Protection: adhesive use limited     Problem: Adult Inpatient Plan of Care  Goal: Absence of Hospital-Acquired Illness or Injury  Intervention: Prevent and Manage VTE (Venous Thromboembolism) Risk  Flowsheets (Taken 12/2/2022 1741)  Activity Management: Up in chair - L3  VTE Prevention/Management: remove, assess skin, and reapply sequential compression device  Range of Motion: active ROM (range of motion) encouraged     Problem: Adult Inpatient Plan of Care  Goal: Optimal Comfort and Wellbeing  Intervention: Monitor Pain and Promote Comfort  Flowsheets (Taken 12/2/2022 1741)  Pain Management Interventions:   medication offered   quiet environment facilitated     Problem: Adult Inpatient Plan of Care  Goal: Optimal Comfort and Wellbeing  Intervention: Provide Person-Centered Care  Flowsheets (Taken 12/2/2022 1741)  Trust Relationship/Rapport:   care explained   choices provided

## 2022-12-02 NOTE — HOSPITAL COURSE
11/27/22: Participated w/ OT. Bed mob SBA. Sit>stand EOB>RW with MIN A and step transfer bed<>BSC with verbal cuing for safe hand placement during transitional movements, and RLE foot drop noted during task.  R-lateral step with CGA and use of RW. [facility] 17.

## 2022-12-02 NOTE — ASSESSMENT & PLAN NOTE
Symptoms: Presented with substernal, non-radiating chest pain, most associated with exertion. Also with associated dyspnea on exertion (and now at rest). No NTG was attempted.  Risk factors: Likely previous atherosclerotic disease (h/o CVA).   Diagnostics: EKG showed sinus rhythm with PVCs, non-specific T wave abnormalities; no over ischemic changes  Troponin uptrending prior to admission: 0.065 > 0.087 > 1.321, BNP 2574  BEBE 3 or 4  A/P: Unclear if new ischemic event is causing further CHF decompensation or if CHF decompensation d/t suboptimal medication regimen, diet noncompliance etc causing NSTEMI d/t demand mismatch.      -- DAPT load (ASA, Brillinta) and maintenance thereafter  -- Full AC with Lovenox for 72 hours  -- Trend trop to peak  -- Statin: atorvastatin 80 qd  -- B-blocker: n/a, start on discharge  -- ACEi/ARB: losartan 25, stopping and replacing with entresto   -- NTG PRN for chest pain  -- EKG STAT for new chest pain or hemodynamic instability  -- Cardiology consulted, not a good candidate for PCI, treating medically with Lovenox, ASA, and brillinta

## 2022-12-02 NOTE — PT/OT/SLP PROGRESS
Speech Language Pathology Treatment    Patient Name:  Gregorio Bishop   MRN:  [id number]  Admitting Diagnosis: NSTEMI (non-ST elevated myocardial infarction)    Recommendations:                 General Recommendations:  Dysphagia therapy and Cognitive-linguistic therapy  Diet recommendations:  Mechanical soft, Liquid Diet Level: Thin   Aspiration Precautions: Standard aspiration precautions   General Precautions: Standard, aspiration, fall  Communication strategies:  none    Subjective     Patient awake and alert. Seated upright in bedside chair.     Pain/Comfort:    No c/o pain    Respiratory Status: Nasal cannula, flow . L/min    Objective:     Has the patient been evaluated by SLP for swallowing?   Yes  Keep patient NPO? No   Current Respiratory Status:        Patient seen for ongoing speech language and dysphagia therapies.  Patient with mindless mastication pattern in absence of PO.  Tolerated thin liquids via straw sips x9 along with regular solids x2 with no overt signs of airway compromise.  Prolonged mastication appreciated though achieved full oral clearance.  Patient recalled 2/3 unassociated words after 1 minute filled delay indep.  Two step commands followed with 100% acc, though multi step commands followed with 50% acc.  Compare and contrast activities completed with 100% acc. Skilled education was provided to patient re: diet recs, standard aspiration precautions of which to follow, and ongoing ST plan of care.      Assessment:     Gregorio Bishop is a 82 y.o. male with an SLP diagnosis of Dysphagia.      Goals:   Multidisciplinary Problems       SLP Goals          Problem: SLP    Goal Priority Disciplines Outcome   SLP Goal     SLP Ongoing, Progressing   Description: Speech-Language Pathology Goals  Goals to be met by 12/7/22  1. Patient will participate in ongoing swallow assessment in order to determine safest least restrictive diet.   2. Patient will participate in a speech/language/cog eval (met  & ongoing).   3. Patient will recall personal information with 75% accuracy and min assist.   4. Patient will answer problem solving/safety awareness questions with 75% accuracy and min assist.                          Plan:     Patient to be seen:  3 x/week   Plan of Care expires:  12/30/22  Plan of Care reviewed with:  patient   SLP Follow-Up:  Yes       Discharge recommendations:  rehabilitation facility   Barriers to Discharge:  None    Time Tracking:     SLP Treatment Date:   12/02/22  Speech Start Time:  0947  Speech Stop Time:  1000     Speech Total Time (min):  13 min    Billable Minutes: Speech Therapy Individual 7 and Treatment Swallowing Dysfunction 6    12/02/2022

## 2022-12-02 NOTE — PROGRESS NOTES
[facility] Medicine  Progress Note    Patient Name: Gregorio Bishop  MRN: [id number]  Patient Class: IP- Inpatient   Admission Date: 11/29/2022  Length of Stay: 2 days  Attending Physician: Nicole Jin MD  Primary Care Provider: Renaldo Molina MD        Subjective:     Principal Problem:NSTEMI (non-ST elevated myocardial infarction)        HPI:  83 yo M PMHx CHF, CVA with RLE deficits [place] rehabilitation center [place] [facility] due to 2 week history of progressive SHORE and associated chest pressure in the substernal region without radiation.  Patient denies any overt feeling of pain, instead feeling chest fullness and chest pressure, mostly when attempting to ambulate at the rehabilitation center.  Patient has also been describing increased dyspnea both on exertion and at rest.  Has difficulty feeling like he gets a full breath in.  Denies abdominal pain, jaw pain, arm pain, nausea, vomiting, diarrhea, fever, chills, cough, sick contacts.  Patient is unsure of what medications he takes.  Patient describes his diet as whatever he feels like eating.  Denies smoking history, reason alcohol use, illicit drugs.  Patient was previously admitted for treatment of congestive heart failure 11/26.    Patient admitted to Hospital Medicine for further evaluation of elevated troponin, shortness of breath without hypoxemia, concerns for NSTEMI.      Overview/Hospital Course:  83 yo M PMHx CHF, CVA with RLE deficits presents from rehabilitation center [place] [facility] due to 2 week history of progressive SHORE and associated chest pressure in the substernal region without radiation. Pt was fond to have ST elevations, pt is not a candidate for PCI per cardiology. Treated mediaclly with ASA, brilinta, and lovenox. Will stop lovenox after 72 hours. Code stroke called due to abrupt altered mentation. CTH showed no new stroke but showed retained blood products from a previous stroke. No plans for intervention, but to  continue medical therapy. Pt improved overnight. Will add entresto and carvedilol. Plan to dispo back to rehab. Difficulty sleeping at night, added trazodone.       Interval History: NAEON. No new complaints this morning. Pt had an episode of delirium this afternoon, was reoriented with family on the phone. Due to difficulty sleeping, will start nightly trazodone. No CP, SOB, and lightheadedness.     Review of Systems  Objective:     Vital Signs (Most Recent):  Temp: 97.9 °F (36.6 °C) (12/02/22 1300)  Pulse: 82 (12/02/22 1511)  Resp: 18 (12/02/22 1300)  BP: 128/72 (12/02/22 1300)  SpO2: 95 % (12/02/22 1300) Vital Signs (24h Range):  Temp:  [97.4 °F (36.3 °C)-98.2 °F (36.8 °C)] 97.9 °F (36.6 °C)  Pulse:  [74-84] 82  Resp:  [16-18] 18  SpO2:  [95 %-99 %] 95 %  BP: (120-142)/(70-78) 128/72     Weight: 60.2 kg (132 lb 11.5 oz)  Body mass index is 22.77 kg/m².    Intake/Output Summary (Last 24 hours) at 12/2/2022 1654  Last data filed at 12/2/2022 0800  Gross per 24 hour   Intake 120 ml   Output 1050 ml   Net -930 ml      Physical Exam    Significant Labs: All pertinent labs within the past 24 hours have been reviewed.  BMP:   Recent Labs   Lab 12/01/22  0600   GLU 84      K 3.8   CL 97   CO2 35*   BUN 20   CREATININE 0.9   CALCIUM 8.7     CBC:   Recent Labs   Lab 11/30/22  1734 12/01/22  0600   WBC 9.76 10.14   HGB 14.1 12.8*   HCT 43.1 40.0    230       Significant Imaging: I have reviewed all pertinent imaging results/findings within the past 24 hours.      Assessment/Plan:      * NSTEMI (non-ST elevated myocardial infarction)  Symptoms: Presented with substernal, non-radiating chest pain, most associated with exertion. Also with associated dyspnea on exertion (and now at rest). No NTG was attempted.  Risk factors: Likely previous atherosclerotic disease (h/o CVA).   Diagnostics: EKG showed sinus rhythm with PVCs, non-specific T wave abnormalities; no over ischemic changes  Troponin uptrending prior to  admission: 0.065 > 0.087 > 1.321, BNP 2574  BEBE 3 or 4  A/P: Unclear if new ischemic event is causing further CHF decompensation or if CHF decompensation d/t suboptimal medication regimen, diet noncompliance etc causing NSTEMI d/t demand mismatch.      -- DAPT load (ASA, Brillinta) and maintenance thereafter  -- Full AC with Lovenox for 72 hours  -- Trend trop to peak  -- Statin: atorvastatin 80 qd  -- B-blocker: n/a, start on discharge  -- ACEi/ARB: losartan 25, stopping and replacing with entresto   -- NTG PRN for chest pain  -- EKG STAT for new chest pain or hemodynamic instability  -- Cardiology consulted, not a good candidate for PCI, treating medically with Lovenox, ASA, and brillinta      Goals of care, counseling/discussion  Spoke with family about code status with patient. After discussion, it was decided to make pt DNR to align with his wishes.       Acute decompensated heart failure  Symptoms: 2-3 weeks of progressive SHORE, dyspnea at rest, orthopnea. Previously admitted 11/26 for ADHF, treated with diuretics.  TTE (11/2022) EF 15%, G3LVDD, decreased from study 09/2022 25%, G1LVDD.  Home meds: losartan, furosemide 20 qd  Dry weight: Unknown  Diagnostics: CXR: pulmonary edema; BNP 2574; Lactate 1.2; O2 requirement RA SpO2 high 90s    Initial etiology: Possibly ischemic cardiomyopathy s/o prev CVA and athersclerotic disease. Patient was not told he had CHF before, family at bedside has not heart CHF diagnosis before either.  Decompensation etiology: Unclear if new ischemic event is causing further CHF decompensation or if CHF decompensation d/t suboptimal medication regimen, diet noncompliance etc causing NSTEMI d/t demand mismatch.      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 No signs of new CVA. Per family, at new baseline functionality.    -- Fall precaution  -- dysphagia diet      Frail elderly  PT/OT consulted  Rehab recommended, referrals sent    Stroke  -- CODE STROKE called on 11/30, CTH showed old retained blood products from recent stroke. No new intervention at this time.   --Continue Lovenox for 72 hours  -- ASA and brilinta  -- Vascular neurology has seen the patient, no new intervention recommended, continue Lovenox, asa, brilinta      VTE Risk Mitigation (From admission, onward)         Ordered     Place sequential compression device  Until discontinued         11/30/22 0256                Discharge Planning   RASHAD: 12/5/2022     Code Status: DNR   Is the patient medically ready for discharge?: No    Reason for patient still in hospital (select all that apply): Treatment  Discharge Plan A: Rehab   Discharge Delays: None known at this time              Jerzy Minaya DO  Department of Hospital Medicine   [facility] - Cardiology Stepdown

## 2022-12-02 NOTE — CONSULTS
[facility] - Cardiology Stepdown  Physical Medicine & Rehab  Consult Note    Patient Name: Gregorio Bishop  MRN: [id number]  Admission Date: 11/29/2022  Hospital Length of Stay: 2 days  Attending Physician: Nicole Jin MD     Inpatient consult to Physical Medicine & Rehabilitation  Consult performed by: Ivette Vinson NP  Consult requested by:  Nicole Jin MD    Collaborating Physician: Eliana Lindo MD  Reason for Consult:  Assess rehabilitation needs     Consults  Subjective:     Principal Problem: NSTEMI (non-ST elevated myocardial infarction)    HPI: Gregorio Bishop is a 82-year-old male with PMHx of CVA (RLE deficits), Heart Failure, and recent admission 11/29/22 for CHF exacerbation and was discharged to [facility] 11/26/[facility]. Patient presented back to [facility] [place] [facility] on 11/29/22 from [facility] for SOB. His work up was notable for troponin to peak 1.4, EKG changes c/w ischemia though not meeting criteria for STEMI. Cardiology consulted, not a good candidate for PCI, treating medically with Lovenox, ASA, and brillinta. Per Cards Unclear if new ischemic event is causing further CHF decompensation or if CHF decompensation d/t suboptimal medication regimen, diet noncompliance etc causing NSTEMI d/t demand mismatch. Hospital course complicated by worsening mentation (Stroke code called. CTA showed mild blood products associated with L occipital deep white matter infarct. Per VN given the exam history and findings this is like a recrudescence verus possible new event).     Functional History: Patient lives with spouse and son in a single story home with 1 step to enter.  Prior to admission, [facility]. DME: RW.       Hospital Course: 11/27/22: Participated w/ OT. Bed mob SBA. Sit>stand EOB>RW with MIN A and step transfer bed<>BSC with verbal cuing for safe hand placement during transitional movements, and RLE foot drop noted during task.  R-lateral step with CGA and use of RW. AMPAC 17.       Past Medical History:   Diagnosis Date     Hypertension     Stroke      No past surgical history on file.  Review of patient's allergies indicates:  No Known Allergies    Scheduled Medications:    aspirin  81 mg Oral Daily    atorvastatin  80 mg Oral QHS    digoxin  0.125 mg Oral Daily    enoxparin  1 mg/kg Subcutaneous Q12H    furosemide (LASIX) injection  40 mg Intravenous Daily    losartan  25 mg Oral Daily    polyethylene glycol  17 g Oral Daily    senna-docusate 8.6-50 mg  1 tablet Oral BID    ticagrelor  90 mg Oral BID       PRN Medications: acetaminophen, iohexoL, melatonin, nitroGLYCERIN, ondansetron, sodium chloride 0.9%    Family History       Problem Relation (Age of Onset)    Alcohol abuse Father    Hypertension Mother, Paternal Aunt, Paternal Grandmother, Paternal Grandfather    No Known Problems Sister, Daughter, Son          Tobacco Use    Smoking status: Never    Smokeless tobacco: Never   Substance and Sexual Activity    Alcohol use: Not Currently     Comment: no alcohol for 6 months    Drug use: Never    Sexual activity: Not Currently     Review of Systems   Constitutional:  Positive for activity change.   Musculoskeletal:  Positive for gait problem.   Neurological:  Positive for weakness.   Psychiatric/Behavioral:  Positive for confusion.      Objective:     Vital Signs (Most Recent):  Temp: 98.2 °F (36.8 °C) (12/02/22 0735)  Pulse: 78 (12/02/22 0735)  Resp: 16 (12/02/22 0735)  BP: 120/70 (12/02/22 0735)  SpO2: 96 % (12/02/22 0735)    Vital Signs (24h Range):  Temp:  [97.4 °F (36.3 °C)-98.2 °F (36.8 °C)] 98.2 °F (36.8 °C)  Pulse:  [74-85] 78  Resp:  [16-18] 16  SpO2:  [95 %-99 %] 96 %  BP: (109-142)/(67-78) 120/70     Body mass index is 22.77 kg/m².    Physical Exam  Vitals and nursing note reviewed.   HENT:      Nose: Nose normal.      Mouth/Throat:      Comments: Poor dentition  Eyes:      Extraocular Movements: Extraocular movements intact.      Pupils: Pupils are equal, round, and reactive to light.    Musculoskeletal:         RLE deficit   Skin:     General: Skin is warm.   Neurological:      Mental Status: He is alert.      Motor: Weakness present.      Gait: Gait abnormal.      Comments: Confusion present  Following commands  Camera sitter at bs   Sitting up in bed all 4 handrails up    Diagnostic Results:   Labs: Reviewed  ECG: Reviewed  CT: Reviewed    Assessment/Plan:     * NSTEMI (non-ST elevated myocardial infarction)  - Cardiology consulted, not a good candidate for PCI, treating medically with Lovenox, ASA, and brillinta.   - Per Cards Unclear if new ischemic event is causing further CHF decompensation or if CHF decompensation d/t suboptimal medication regimen, diet noncompliance etc causing NSTEMI d/t demand mismatch.     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     Recommendations  -  Encourage mobility, OOB in chair at least 3 hours per day, and early ambulation as appropriate  -  PT/OT evaluate and treat  -  Pain management  -  Monitor for and prevent skin breakdown and pressure ulcers  · Early mobility, repositioning/weight shifting every 20-30 minutes when sitting, turn patient every 2 hours, proper mattress/overlay and chair cushioning, pressure relief/heel protector boots  -  DVT prophylaxis    -  Reviewed discharge options (IP rehab, SNF, HH therapy, and OP therapy)    PM&R Recommendation:     At this time, the PM&R team has reviewed this patient's ongoing medical case including inpatient diagnosis, medical history, clinical examination, labs, vitals, current social and functional history.    We will continue to follow for improvement in mentation with removal of camera sitter as a potential rehab candidate.    Thank you for your consult.     Ivette Vinson NP  Department of Physical Medicine & Rehab  [facility] - Cardiology Stepdown

## 2022-12-02 NOTE — ASSESSMENT & PLAN NOTE
Symptoms: 2-3 weeks of progressive SHORE, dyspnea at rest, orthopnea. Previously admitted 11/26 for ADHF, treated with diuretics.  TTE (11/2022) EF 15%, G3LVDD, decreased from study 09/2022 25%, G1LVDD.  Home meds: losartan, furosemide 20 qd  Dry weight: Unknown  Diagnostics: CXR: pulmonary edema; BNP 2574; Lactate 1.2; O2 requirement RA SpO2 high 90s    Initial etiology: Possibly ischemic cardiomyopathy s/o prev CVA and athersclerotic disease. Patient was not told he had CHF before, family at bedside has not heart CHF diagnosis before either.  Decompensation etiology: Unclear if new ischemic event is causing further CHF decompensation or if CHF decompensation d/t suboptimal medication regimen, diet noncompliance etc causing NSTEMI d/t demand mismatch.      -- Lasix 40 IV BID   -- Daily weights (standing if tolerated)  -- Strict I/Os; goal net negative 1.5 - 2 L / day  -- Fluid restriction at 1500mL  -- Cardiac diet w/ 2 g Na restriction when diet tolerated  -- cardiology consulted, recommended digoxin 0.25mg qd after load  -- Started entresto and carvedilol

## 2022-12-02 NOTE — ASSESSMENT & PLAN NOTE
- Cardiology consulted, not a good candidate for PCI, treating medically with Lovenox, ASA, and brillinta.   - Per Cards Unclear if new ischemic event is causing further CHF decompensation or if CHF decompensation d/t suboptimal medication regimen, diet noncompliance etc causing NSTEMI d/t demand mismatch.

## 2022-12-02 NOTE — CONSULTS
Inpatient consult to Physical Medicine Rehab  Consult performed by: Ivette Vinson NP  Consult ordered by: Nicole Jin MD  Reason for consult: Assess rehab needs    Reviewed patient history and current admission.  Rehab team following.  Full consult to follow.    NEGIN Miles, FNP-C  Physical Medicine & Rehabilitation   12/02/2022

## 2022-12-02 NOTE — HOSPITAL COURSE
83 yo M PMHx CHF, CVA with RLE deficits [place] [facility] [place] 2 week history of progressive SHORE and associated chest pressure in the substernal region without radiation. Pt was found to have ST elevations, however was deemed not a candidate for intervention after evaluation by Cardiology. He was initiated on medical management including ASA, Brilinta, high dose statin, Coreg and completed 3 days of lovenox. Patient had abrupt change in mentation on 11/30. Imaging showed mild blood products associated with prior recent left occipital deep white matter infarct but otherwise no new findings. Patient continued to clinically improve over the next few days. Had some agitation and delirium overnight that resolved with adequate sleep and delirium precautions. Patient was stable on discharge to inpatient rehab with CAD and CHF GDMT and referral for Cardiology followup.

## 2022-12-02 NOTE — PT/OT/SLP PROGRESS
Occupational Therapy      Patient Name:  Gregorio Bishop   MRN:  [id number]    OT attempted session this afternoon, and pt found in chair with nsg and 3 members of medical team. Pt with increased confusion. Medical staff providing reassurance and reorientation. Pt visibly upset re: current situation with poor insight into his surroundings. Pt unable to engage in therapy at time of therapy arrival and OT will check status at later date to initiate eval as appropriate.     12/2/2022

## 2022-12-02 NOTE — PLAN OF CARE
Patient returning to [facility]. No post-acute needs.    Sue Guevara, [facility]  Case Management Department  [email or web address]       12/02/22 1430   Post-Acute Status   Post-Acute Authorization Placement   Post-Acute Placement Status Set-up Complete/Auth obtained   Discharge Delays None known at this time   Discharge Plan   Discharge Plan A Rehab   Discharge Plan B Rehab

## 2022-12-02 NOTE — HPI
Gregorio Bishop is a 82-year-old male with PMHx of CVA (RLE deficits), Heart Failure, and recent admission 11/29/22 for CHF exacerbation and was discharged to [facility] 11/26/22. [place] [facility] [place] [facility] for SOB. His work up was notable for troponin to peak 1.4, EKG changes c/w ischemia though not meeting criteria for STEMI. Cardiology consulted, not a good candidate for PCI, treating medically with Lovenox, ASA, and brillinta. Per Cards Unclear if new ischemic event is causing further CHF decompensation or if CHF decompensation d/t suboptimal medication regimen, diet noncompliance etc causing NSTEMI d/t demand mismatch. Hospital course complicated by worsening mentation (Stroke code called. CTA showed mild blood products associated with L occipital deep white matter infarct. Per VN given the exam history and findings this is like a recrudescence verus possible new event).     Functional History: Patient lives with spouse and son in a single story home with 1 step to enter.  Prior to admission, [facility]. DME: [facility].

## 2022-12-03 PROBLEM — F03.918 DEMENTIA WITH BEHAVIORAL DISTURBANCE: Status: ACTIVE | Noted: 2022-12-03

## 2022-12-03 PROBLEM — R94.31 QT PROLONGATION: Status: ACTIVE | Noted: 2022-12-03

## 2022-12-03 LAB
ALBUMIN SERPL BCP-MCNC: 3.2 G/DL (ref 3.5–5.2)
ALP SERPL-CCNC: 68 U/L (ref 55–135)
ALT SERPL W/O P-5'-P-CCNC: 23 U/L (ref 10–44)
ANION GAP SERPL CALC-SCNC: 7 MMOL/L (ref 8–16)
AST SERPL-CCNC: 30 U/L (ref 10–40)
BASOPHILS # BLD AUTO: 0.05 K/UL (ref 0–0.2)
BASOPHILS NFR BLD: 0.5 % (ref 0–1.9)
BILIRUB SERPL-MCNC: 1.8 MG/DL (ref 0.1–1)
BUN SERPL-MCNC: 15 MG/DL (ref 8–23)
CALCIUM SERPL-MCNC: 8.9 MG/DL (ref 8.7–10.5)
CHLORIDE SERPL-SCNC: 99 MMOL/L (ref 95–110)
CO2 SERPL-SCNC: 30 MMOL/L (ref 23–29)
CREAT SERPL-MCNC: 0.9 MG/DL (ref 0.5–1.4)
DIFFERENTIAL METHOD: ABNORMAL
EOSINOPHIL # BLD AUTO: 0.1 K/UL (ref 0–0.5)
EOSINOPHIL NFR BLD: 0.8 % (ref 0–8)
ERYTHROCYTE [DISTWIDTH] IN BLOOD BY AUTOMATED COUNT: 12.1 % (ref 11.5–14.5)
EST. GFR  (NO RACE VARIABLE): >60 ML/MIN/1.73 M^2
GLUCOSE SERPL-MCNC: 119 MG/DL (ref 70–110)
HCT VFR BLD AUTO: 40.6 % (ref 40–54)
HGB BLD-MCNC: 12.9 G/DL (ref 14–18)
IMM GRANULOCYTES # BLD AUTO: 0.05 K/UL (ref 0–0.04)
IMM GRANULOCYTES NFR BLD AUTO: 0.5 % (ref 0–0.5)
LYMPHOCYTES # BLD AUTO: 0.8 K/UL (ref 1–4.8)
LYMPHOCYTES NFR BLD: 7.8 % (ref 18–48)
MCH RBC QN AUTO: 30.4 PG (ref 27–31)
MCHC RBC AUTO-ENTMCNC: 31.8 G/DL (ref 32–36)
MCV RBC AUTO: 96 FL (ref 82–98)
MONOCYTES # BLD AUTO: 1 K/UL (ref 0.3–1)
MONOCYTES NFR BLD: 9.1 % (ref 4–15)
NEUTROPHILS # BLD AUTO: 8.6 K/UL (ref 1.8–7.7)
NEUTROPHILS NFR BLD: 81.3 % (ref 38–73)
NRBC BLD-RTO: 0 /100 WBC
PLATELET # BLD AUTO: 273 K/UL (ref 150–450)
PMV BLD AUTO: 10.3 FL (ref 9.2–12.9)
POTASSIUM SERPL-SCNC: 4.1 MMOL/L (ref 3.5–5.1)
PROT SERPL-MCNC: 6.8 G/DL (ref 6–8.4)
RBC # BLD AUTO: 4.24 M/UL (ref 4.6–6.2)
SODIUM SERPL-SCNC: 136 MMOL/L (ref 136–145)
TROPONIN I SERPL DL<=0.01 NG/ML-MCNC: 0.76 NG/ML (ref 0–0.03)
TROPONIN I SERPL DL<=0.01 NG/ML-MCNC: 0.82 NG/ML (ref 0–0.03)
WBC # BLD AUTO: 10.54 K/UL (ref 3.9–12.7)

## 2022-12-03 PROCEDURE — 25000003 PHARM REV CODE 250

## 2022-12-03 PROCEDURE — 25000242 PHARM REV CODE 250 ALT 637 W/ HCPCS

## 2022-12-03 PROCEDURE — 25000003 PHARM REV CODE 250: Performed by: STUDENT IN AN ORGANIZED HEALTH CARE EDUCATION/TRAINING PROGRAM

## 2022-12-03 PROCEDURE — 85025 COMPLETE CBC W/AUTO DIFF WBC: CPT

## 2022-12-03 PROCEDURE — 25000003 PHARM REV CODE 250: Performed by: HOSPITALIST

## 2022-12-03 PROCEDURE — 80053 COMPREHEN METABOLIC PANEL: CPT

## 2022-12-03 PROCEDURE — 63600175 PHARM REV CODE 636 W HCPCS

## 2022-12-03 PROCEDURE — 93010 EKG 12-LEAD: ICD-10-PCS | Mod: ,,, | Performed by: INTERNAL MEDICINE

## 2022-12-03 PROCEDURE — 36415 COLL VENOUS BLD VENIPUNCTURE: CPT

## 2022-12-03 PROCEDURE — 93010 ELECTROCARDIOGRAM REPORT: CPT | Mod: ,,, | Performed by: INTERNAL MEDICINE

## 2022-12-03 PROCEDURE — 84484 ASSAY OF TROPONIN QUANT: CPT

## 2022-12-03 PROCEDURE — 20600001 HC STEP DOWN PRIVATE ROOM

## 2022-12-03 PROCEDURE — 99233 PR SUBSEQUENT HOSPITAL CARE,LEVL III: ICD-10-PCS | Mod: GC,,, | Performed by: HOSPITALIST

## 2022-12-03 PROCEDURE — 93005 ELECTROCARDIOGRAM TRACING: CPT

## 2022-12-03 PROCEDURE — 99233 SBSQ HOSP IP/OBS HIGH 50: CPT | Mod: GC,,, | Performed by: HOSPITALIST

## 2022-12-03 RX ORDER — QUETIAPINE FUMARATE 25 MG/1
25 TABLET, FILM COATED ORAL ONCE
Status: COMPLETED | OUTPATIENT
Start: 2022-12-03 | End: 2022-12-03

## 2022-12-03 RX ORDER — TRAZODONE HYDROCHLORIDE 50 MG/1
50 TABLET ORAL NIGHTLY
Status: DISCONTINUED | OUTPATIENT
Start: 2022-12-03 | End: 2022-12-05 | Stop reason: HOSPADM

## 2022-12-03 RX ADMIN — DIGOXIN 0.12 MG: 125 TABLET ORAL at 09:12

## 2022-12-03 RX ADMIN — NITROGLYCERIN 0.4 MG: 0.4 TABLET, ORALLY DISINTEGRATING SUBLINGUAL at 09:12

## 2022-12-03 RX ADMIN — POLYETHYLENE GLYCOL 3350 17 G: 17 POWDER, FOR SOLUTION ORAL at 09:12

## 2022-12-03 RX ADMIN — SENNOSIDES AND DOCUSATE SODIUM 1 TABLET: 50; 8.6 TABLET ORAL at 08:12

## 2022-12-03 RX ADMIN — Medication 6 MG: at 08:12

## 2022-12-03 RX ADMIN — SACUBITRIL AND VALSARTAN 1 TABLET: 24; 26 TABLET, FILM COATED ORAL at 08:12

## 2022-12-03 RX ADMIN — FUROSEMIDE 40 MG: 10 INJECTION, SOLUTION INTRAVENOUS at 09:12

## 2022-12-03 RX ADMIN — CARVEDILOL 3.12 MG: 3.12 TABLET, FILM COATED ORAL at 08:12

## 2022-12-03 RX ADMIN — QUETIAPINE FUMARATE 25 MG: 25 TABLET ORAL at 02:12

## 2022-12-03 RX ADMIN — SACUBITRIL AND VALSARTAN 1 TABLET: 24; 26 TABLET, FILM COATED ORAL at 09:12

## 2022-12-03 RX ADMIN — ASPIRIN 81 MG: 81 TABLET, COATED ORAL at 09:12

## 2022-12-03 RX ADMIN — ATORVASTATIN CALCIUM 80 MG: 40 TABLET, FILM COATED ORAL at 08:12

## 2022-12-03 RX ADMIN — SENNOSIDES AND DOCUSATE SODIUM 1 TABLET: 50; 8.6 TABLET ORAL at 09:12

## 2022-12-03 RX ADMIN — TICAGRELOR 90 MG: 90 TABLET ORAL at 08:12

## 2022-12-03 RX ADMIN — TICAGRELOR 90 MG: 90 TABLET ORAL at 09:12

## 2022-12-03 RX ADMIN — CARVEDILOL 3.12 MG: 3.12 TABLET, FILM COATED ORAL at 09:12

## 2022-12-03 RX ADMIN — TRAZODONE HYDROCHLORIDE 50 MG: 50 TABLET ORAL at 08:12

## 2022-12-03 NOTE — PLAN OF CARE
Pt remained free of injuries, falls, and trauma. VSS. No complaints of pain mentioned. Pt was oriented at beginning of shift with family around. As night progressed pt became restless, agitated. 1 x dose of Seroquel administered.  Wife and telesitter at bedside for safety concerns. Fall precautions maintained. Plan of care reviewed w/ pt's wife. Wife verbalizes understanding.    Problem: Adult Inpatient Plan of Care  Goal: Plan of Care Review  Outcome: Ongoing, Progressing  Flowsheets (Taken 12/3/2022 0345)  Plan of Care Reviewed With:   patient   spouse     Problem: Fall Injury Risk  Goal: Absence of Fall and Fall-Related Injury  Intervention: Identify and Manage Contributors  Flowsheets (Taken 12/3/2022 0345)  Self-Care Promotion:   BADL personal objects within reach   BADL personal routines maintained  Medication Review/Management: medications reviewed

## 2022-12-03 NOTE — ASSESSMENT & PLAN NOTE
delirium precautions  trazodone 50 mg for sleep  No labs until 8 am  Vitals no check between 11pm and 5 am

## 2022-12-03 NOTE — SUBJECTIVE & OBJECTIVE
Interval History: Difficulty sleeping last night th trazodone, received a dose of Seroquel, was able to sleep. No new complaints this morning. Explained to family that his delirium and dementia may be due to his recent strokes, illness in the hospital, and advanced age. May need more time to see what his new baseline is outside of the hospital. Started entresto and coreg.     Review of Systems   Constitutional:  Negative for chills and fever.   HENT:  Negative for rhinorrhea and sore throat.    Respiratory:  Negative for cough, shortness of breath and wheezing.    Cardiovascular:  Negative for chest pain and leg swelling.   Gastrointestinal:  Negative for abdominal pain, blood in stool, constipation, diarrhea, nausea and vomiting.   Genitourinary:  Negative for dysuria and hematuria.   Skin:  Negative for rash.   Neurological:  Negative for dizziness (mild), light-headedness and headaches.   All other systems reviewed and are negative.  Objective:     Vital Signs (Most Recent):  Temp: 97.8 °F (36.6 °C) (12/03/22 1543)  Pulse: 88 (12/03/22 1543)  Resp: 18 (12/03/22 1543)  BP: 126/71 (12/03/22 1543)  SpO2: 96 % (12/03/22 1543) Vital Signs (24h Range):  Temp:  [97.6 °F (36.4 °C)-98.4 °F (36.9 °C)] 97.8 °F (36.6 °C)  Pulse:  [] 88  Resp:  [16-18] 18  SpO2:  [94 %-97 %] 96 %  BP: (109-153)/(66-94) 126/71     Weight: 60.2 kg (132 lb 11.5 oz)  Body mass index is 22.77 kg/m².    Intake/Output Summary (Last 24 hours) at 12/3/2022 1647  Last data filed at 12/3/2022 1024  Gross per 24 hour   Intake 240 ml   Output 500 ml   Net -260 ml      Physical Exam  Vitals and nursing note reviewed.   Constitutional:       General: He is not in acute distress.     Comments: Elderly;  Frail;   HENT:      Head: Normocephalic and atraumatic.   Eyes:      Extraocular Movements: Extraocular movements intact.      Pupils: Pupils are equal, round, and reactive to light.   Cardiovascular:      Rate and Rhythm: Normal rate and regular  rhythm.      Pulses: Normal pulses.   Pulmonary:      Effort: Pulmonary effort is normal. No respiratory distress.      Breath sounds: Normal breath sounds.   Abdominal:      General: Abdomen is flat.      Palpations: Abdomen is soft.      Tenderness: There is no abdominal tenderness.   Musculoskeletal:      Right lower leg: No edema.      Left lower leg: No edema.   Skin:     General: Skin is warm and dry.   Neurological:      Mental Status: He is alert.       Significant Labs: All pertinent labs within the past 24 hours have been reviewed.  BMP:   Recent Labs   Lab 12/03/22  0254   *      K 4.1   CL 99   CO2 30*   BUN 15   CREATININE 0.9   CALCIUM 8.9     CBC:   Recent Labs   Lab 12/02/22  2047 12/03/22  0254   WBC 9.41 10.54   HGB 13.6* 12.9*   HCT 43.5 40.6    273       Significant Imaging: I have reviewed all pertinent imaging results/findings within the past 24 hours.

## 2022-12-03 NOTE — PROGRESS NOTES
[facility] - Cardiology [facility] Medicine  Progress Note    Patient Name: Gregorio Bishop  MRN: [id number]  Patient Class: IP- Inpatient   Admission Date: 11/29/2022  Length of Stay: 3 days  Attending Physician: Nicole Jin MD  Primary Care Provider: Renaldo Molina MD        Subjective:     Principal Problem:NSTEMI (non-ST elevated myocardial infarction)        HPI:  81 yo M PMHx CHF, CVA with RLE deficits [place] rehabilitation center [place] [facility] to 2 week history of progressive SHORE and associated chest pressure in the substernal region without radiation.  Patient denies any overt feeling of pain, instead feeling chest fullness and chest pressure, mostly when attempting to ambulate at the rehabilitation center.  Patient has also been describing increased dyspnea both on exertion and at rest.  Has difficulty feeling like he gets a full breath in.  Denies abdominal pain, jaw pain, arm pain, nausea, vomiting, diarrhea, fever, chills, cough, sick contacts.  Patient is unsure of what medications he takes.  Patient describes his diet as whatever he feels like eating.  Denies smoking history, reason alcohol use, illicit drugs.  Patient was previously admitted for treatment of congestive heart failure 11/26.    Patient admitted to Hospital Medicine for further evaluation of elevated troponin, shortness of breath without hypoxemia, concerns for NSTEMI.      Overview/Hospital Course:  81 yo M PMHx CHF, CVA with RLE deficits [place] rehabilitation center [place] [facility] [place] to 2 week history of progressive SHORE and associated chest pressure in the substernal region without radiation. Pt was fond to have ST elevations, pt is not a candidate for PCI per cardiology. Treated mediaclly with ASA, brilinta, and lovenox. Will stop lovenox after 72 hours. Code stroke called due to abrupt altered mentation. CTH showed no new stroke but showed retained blood products from a previous stroke. No plans for intervention, but to  continue medical therapy. Pt improved overnight. Will add entresto and carvedilol. Plan to dispo back to rehab. Difficulty sleeping at night, added trazodone. One dose seroquel overnight helped, increased trazedone dose to 50 mg. Will try to avoid seroquel due to black box warning.       Interval History: Difficulty sleeping last night th trazodone, received a dose of Seroquel, was able to sleep. No new complaints this morning. Explained to family that his delirium and dementia may be due to his recent strokes, illness in the hospital, and advanced age. May need more time to see what his new baseline is outside of the hospital. Started entresto and coreg. Episode of chest pain that resolved with nitro. No significant EKG changes or troponin elevation    Review of Systems   Constitutional:  Negative for chills and fever.   HENT:  Negative for rhinorrhea and sore throat.    Respiratory:  Negative for cough, shortness of breath and wheezing.    Cardiovascular:  Negative for chest pain and leg swelling.   Gastrointestinal:  Negative for abdominal pain, blood in stool, constipation, diarrhea, nausea and vomiting.   Genitourinary:  Negative for dysuria and hematuria.   Skin:  Negative for rash.   Neurological:  Negative for dizziness (mild), light-headedness and headaches.   All other systems reviewed and are negative.  Objective:     Vital Signs (Most Recent):  Temp: 97.8 °F (36.6 °C) (12/03/22 1543)  Pulse: 88 (12/03/22 1543)  Resp: 18 (12/03/22 1543)  BP: 126/71 (12/03/22 1543)  SpO2: 96 % (12/03/22 1543) Vital Signs (24h Range):  Temp:  [97.6 °F (36.4 °C)-98.4 °F (36.9 °C)] 97.8 °F (36.6 °C)  Pulse:  [] 88  Resp:  [16-18] 18  SpO2:  [94 %-97 %] 96 %  BP: (109-153)/(66-94) 126/71     Weight: 60.2 kg (132 lb 11.5 oz)  Body mass index is 22.77 kg/m².    Intake/Output Summary (Last 24 hours) at 12/3/2022 1647  Last data filed at 12/3/2022 1024  Gross per 24 hour   Intake 240 ml   Output 500 ml   Net -260 ml       Physical Exam  Vitals and nursing note reviewed.   Constitutional:       General: He is not in acute distress.     Comments: Elderly;  Frail;   HENT:      Head: Normocephalic and atraumatic.   Eyes:      Extraocular Movements: Extraocular movements intact.      Pupils: Pupils are equal, round, and reactive to light.   Cardiovascular:      Rate and Rhythm: Normal rate and regular rhythm.      Pulses: Normal pulses.   Pulmonary:      Effort: Pulmonary effort is normal. No respiratory distress.      Breath sounds: Normal breath sounds.   Abdominal:      General: Abdomen is flat.      Palpations: Abdomen is soft.      Tenderness: There is no abdominal tenderness.   Musculoskeletal:      Right lower leg: No edema.      Left lower leg: No edema.   Skin:     General: Skin is warm and dry.   Neurological:      Mental Status: He is alert.       Significant Labs: All pertinent labs within the past 24 hours have been reviewed.  BMP:   Recent Labs   Lab 12/03/22  0254   *      K 4.1   CL 99   CO2 30*   BUN 15   CREATININE 0.9   CALCIUM 8.9     CBC:   Recent Labs   Lab 12/02/22  2047 12/03/22  0254   WBC 9.41 10.54   HGB 13.6* 12.9*   HCT 43.5 40.6    273       Significant Imaging: I have reviewed all pertinent imaging results/findings within the past 24 hours.      Assessment/Plan:      * NSTEMI (non-ST elevated myocardial infarction)  Symptoms: Presented with substernal, non-radiating chest pain, most associated with exertion. Also with associated dyspnea on exertion (and now at rest). No NTG was attempted.  Risk factors: Likely previous atherosclerotic disease (h/o CVA).   Diagnostics: EKG showed sinus rhythm with PVCs, non-specific T wave abnormalities; no over ischemic changes  Troponin uptrending prior to admission: 0.065 > 0.087 > 1.321, BNP 2574  BEBE 3 or 4  A/P: Unclear if new ischemic event is causing further CHF decompensation or if CHF decompensation d/t suboptimal medication regimen, diet  noncompliance etc causing NSTEMI d/t demand mismatch.      -- DAPT load (ASA, Brillinta) and maintenance thereafter  -- Full AC with Lovenox for 72 hours  -- Trend trop to peak  -- Statin: atorvastatin 80 qd  -- B-blocker: n/a, start on discharge  -- ACEi/ARB: losartan 25, stopping and replacing with entresto   -- NTG PRN for chest pain  -- EKG STAT for new chest pain or hemodynamic instability  -- Cardiology consulted, not a good candidate for PCI, treating medically with Lovenox, ASA, and brillinta      QT prolongation    QTc 505  Avoid Qt prolonging drugs    Dementia with behavioral disturbance  delirium precautions  trazodone 50 mg for sleep  No labs until 8 am  Vitals no check between 11pm and 5 am    Goals of care, counseling/discussion  Spoke with family about code status with patient. After discussion, it was decided to make pt DNR to align with his wishes.       Acute decompensated heart failure  Symptoms: 2-3 weeks of progressive SHORE, dyspnea at rest, orthopnea. Previously admitted 11/26 for ADHF, treated with diuretics.  TTE (11/2022) EF 15%, G3LVDD, decreased from study 09/2022 25%, G1LVDD.  Home meds: losartan, furosemide 20 qd  Dry weight: Unknown  Diagnostics: CXR: pulmonary edema; BNP 2574; Lactate 1.2; O2 requirement RA SpO2 high 90s    Initial etiology: Possibly ischemic cardiomyopathy s/o prev CVA and athersclerotic disease. Patient was not told he had CHF before, family at bedside has not heart CHF diagnosis before either.  Decompensation etiology: Unclear if new ischemic event is causing further CHF decompensation or if CHF decompensation d/t suboptimal medication regimen, diet noncompliance etc causing NSTEMI d/t demand mismatch.      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 No signs of new CVA. Per family, at new baseline functionality.    -- Fall precaution  -- dysphagia diet      Frail elderly  PT/OT consulted  Rehab recommended, referrals sent    Stroke  -- CODE STROKE called on 11/30, CTH showed old retained blood products from recent stroke. No new intervention at this time.   --Continue Lovenox for 72 hours  -- ASA and brilinta  -- Vascular neurology has seen the patient, no new intervention recommended, asa, brilinta  - Stopped lovenox after 72 hours      VTE Risk Mitigation (From admission, onward)           Ordered     Place sequential compression device  Until discontinued         11/30/22 0256                    Discharge Planning   RASHAD: 12/5/2022     Code Status: DNR   Is the patient medically ready for discharge?: No    Reason for patient still in hospital (select all that apply): Treatment  Discharge Plan A: Rehab   Discharge Delays: None known at this time              Jerzy Minaya DO  Department of Hospital Medicine   [facility] - [facility]

## 2022-12-03 NOTE — PT/OT/SLP PROGRESS
Occupational Therapy      Patient Name:  Gregorio Bishop   MRN:  [id number]    Patient not seen today secondary to increased lethargy, unable to maintain arousal (pt given seroquel overnight for increased agitation). Unable to follow up in PM. Will follow-up as appropriate.    12/3/2022

## 2022-12-04 PROBLEM — I50.43 ACUTE ON CHRONIC COMBINED SYSTOLIC AND DIASTOLIC HEART FAILURE: Status: ACTIVE | Noted: 2022-11-30

## 2022-12-04 PROBLEM — R94.31 ABNORMAL EKG: Status: RESOLVED | Noted: 2020-12-03 | Resolved: 2022-12-04

## 2022-12-04 PROBLEM — R29.898 WEAKNESS OF RIGHT LOWER EXTREMITY: Status: RESOLVED | Noted: 2021-09-07 | Resolved: 2022-12-04

## 2022-12-04 PROBLEM — R41.0 DELIRIUM: Status: ACTIVE | Noted: 2022-12-03

## 2022-12-04 LAB
ALBUMIN SERPL BCP-MCNC: 3.2 G/DL (ref 3.5–5.2)
ALP SERPL-CCNC: 71 U/L (ref 55–135)
ALT SERPL W/O P-5'-P-CCNC: 38 U/L (ref 10–44)
ANION GAP SERPL CALC-SCNC: 7 MMOL/L (ref 8–16)
AST SERPL-CCNC: 52 U/L (ref 10–40)
BASOPHILS # BLD AUTO: 0.05 K/UL (ref 0–0.2)
BASOPHILS NFR BLD: 0.5 % (ref 0–1.9)
BILIRUB SERPL-MCNC: 1.6 MG/DL (ref 0.1–1)
BUN SERPL-MCNC: 16 MG/DL (ref 8–23)
CALCIUM SERPL-MCNC: 8.6 MG/DL (ref 8.7–10.5)
CHLORIDE SERPL-SCNC: 102 MMOL/L (ref 95–110)
CO2 SERPL-SCNC: 28 MMOL/L (ref 23–29)
CREAT SERPL-MCNC: 1 MG/DL (ref 0.5–1.4)
DIFFERENTIAL METHOD: ABNORMAL
EOSINOPHIL # BLD AUTO: 0.1 K/UL (ref 0–0.5)
EOSINOPHIL NFR BLD: 1.3 % (ref 0–8)
ERYTHROCYTE [DISTWIDTH] IN BLOOD BY AUTOMATED COUNT: 12 % (ref 11.5–14.5)
EST. GFR  (NO RACE VARIABLE): >60 ML/MIN/1.73 M^2
GLUCOSE SERPL-MCNC: 168 MG/DL (ref 70–110)
HCT VFR BLD AUTO: 44.8 % (ref 40–54)
HGB BLD-MCNC: 14.3 G/DL (ref 14–18)
IMM GRANULOCYTES # BLD AUTO: 0.04 K/UL (ref 0–0.04)
IMM GRANULOCYTES NFR BLD AUTO: 0.4 % (ref 0–0.5)
LYMPHOCYTES # BLD AUTO: 1.1 K/UL (ref 1–4.8)
LYMPHOCYTES NFR BLD: 11.3 % (ref 18–48)
MCH RBC QN AUTO: 30.2 PG (ref 27–31)
MCHC RBC AUTO-ENTMCNC: 31.9 G/DL (ref 32–36)
MCV RBC AUTO: 95 FL (ref 82–98)
MONOCYTES # BLD AUTO: 0.8 K/UL (ref 0.3–1)
MONOCYTES NFR BLD: 7.6 % (ref 4–15)
NEUTROPHILS # BLD AUTO: 7.8 K/UL (ref 1.8–7.7)
NEUTROPHILS NFR BLD: 78.9 % (ref 38–73)
NRBC BLD-RTO: 0 /100 WBC
PLATELET # BLD AUTO: 333 K/UL (ref 150–450)
PMV BLD AUTO: 10.6 FL (ref 9.2–12.9)
POTASSIUM SERPL-SCNC: 4 MMOL/L (ref 3.5–5.1)
PROT SERPL-MCNC: 7 G/DL (ref 6–8.4)
RBC # BLD AUTO: 4.74 M/UL (ref 4.6–6.2)
SODIUM SERPL-SCNC: 137 MMOL/L (ref 136–145)
WBC # BLD AUTO: 9.91 K/UL (ref 3.9–12.7)

## 2022-12-04 PROCEDURE — 20600001 HC STEP DOWN PRIVATE ROOM

## 2022-12-04 PROCEDURE — 25000003 PHARM REV CODE 250

## 2022-12-04 PROCEDURE — 99233 PR SUBSEQUENT HOSPITAL CARE,LEVL III: ICD-10-PCS | Mod: GC,,, | Performed by: HOSPITALIST

## 2022-12-04 PROCEDURE — 25000003 PHARM REV CODE 250: Performed by: HOSPITALIST

## 2022-12-04 PROCEDURE — 97165 OT EVAL LOW COMPLEX 30 MIN: CPT

## 2022-12-04 PROCEDURE — 97116 GAIT TRAINING THERAPY: CPT | Mod: CQ

## 2022-12-04 PROCEDURE — 36415 COLL VENOUS BLD VENIPUNCTURE: CPT

## 2022-12-04 PROCEDURE — 80053 COMPREHEN METABOLIC PANEL: CPT

## 2022-12-04 PROCEDURE — 97535 SELF CARE MNGMENT TRAINING: CPT

## 2022-12-04 PROCEDURE — 63600175 PHARM REV CODE 636 W HCPCS

## 2022-12-04 PROCEDURE — 25000003 PHARM REV CODE 250: Performed by: STUDENT IN AN ORGANIZED HEALTH CARE EDUCATION/TRAINING PROGRAM

## 2022-12-04 PROCEDURE — 99233 SBSQ HOSP IP/OBS HIGH 50: CPT | Mod: GC,,, | Performed by: HOSPITALIST

## 2022-12-04 PROCEDURE — 85025 COMPLETE CBC W/AUTO DIFF WBC: CPT

## 2022-12-04 RX ADMIN — TICAGRELOR 90 MG: 90 TABLET ORAL at 09:12

## 2022-12-04 RX ADMIN — POLYETHYLENE GLYCOL 3350 17 G: 17 POWDER, FOR SOLUTION ORAL at 09:12

## 2022-12-04 RX ADMIN — CARVEDILOL 3.12 MG: 3.12 TABLET, FILM COATED ORAL at 09:12

## 2022-12-04 RX ADMIN — FUROSEMIDE 40 MG: 10 INJECTION, SOLUTION INTRAVENOUS at 09:12

## 2022-12-04 RX ADMIN — SENNOSIDES AND DOCUSATE SODIUM 1 TABLET: 50; 8.6 TABLET ORAL at 09:12

## 2022-12-04 RX ADMIN — DIGOXIN 0.12 MG: 125 TABLET ORAL at 09:12

## 2022-12-04 RX ADMIN — ASPIRIN 81 MG: 81 TABLET, COATED ORAL at 09:12

## 2022-12-04 RX ADMIN — TRAZODONE HYDROCHLORIDE 50 MG: 50 TABLET ORAL at 09:12

## 2022-12-04 RX ADMIN — ATORVASTATIN CALCIUM 80 MG: 40 TABLET, FILM COATED ORAL at 09:12

## 2022-12-04 RX ADMIN — SACUBITRIL AND VALSARTAN 1 TABLET: 24; 26 TABLET, FILM COATED ORAL at 09:12

## 2022-12-04 RX ADMIN — Medication 6 MG: at 09:12

## 2022-12-04 NOTE — ASSESSMENT & PLAN NOTE
-- CODE STROKE called on 11/30, CTH showed old retained blood products from recent stroke. No new intervention at this time.   --Continue Lovenox for 72 hours  -- ASA and brilinta  -- Vascular neurology has seen the patient, no new intervention recommended, asa, brilinta  - Stopped lovenox after 72 hours

## 2022-12-04 NOTE — PROGRESS NOTES
Nixon Telesitter removed from room at the request of the family. Wife remained at bedside  with patient.

## 2022-12-04 NOTE — SUBJECTIVE & OBJECTIVE
Interval History: NAEON. Slept well overnight without use of trazodone. No complaints this morning. Updated wife and daughter at bedside about the plan. Daughter would prefer rehab facility. Will continue to try get patient into the rehab facility.     Review of Systems   Constitutional:  Negative for chills and fever.   HENT:  Negative for rhinorrhea and sore throat.    Respiratory:  Negative for cough, shortness of breath and wheezing.    Cardiovascular:  Negative for chest pain and leg swelling.   Gastrointestinal:  Negative for abdominal pain, blood in stool, constipation, diarrhea, nausea and vomiting.   Genitourinary:  Negative for dysuria and hematuria.   Skin:  Negative for rash.   Neurological:  Negative for dizziness (mild), light-headedness and headaches.   All other systems reviewed and are negative.  Objective:     Vital Signs (Most Recent):  Temp: 97.5 °F (36.4 °C) (12/04/22 0822)  Pulse: 75 (12/04/22 0822)  Resp: 20 (12/04/22 0822)  BP: 134/87 (12/04/22 0822)  SpO2: 95 % (12/04/22 0822) Vital Signs (24h Range):  Temp:  [97.4 °F (36.3 °C)-98.6 °F (37 °C)] 97.5 °F (36.4 °C)  Pulse:  [68-93] 75  Resp:  [16-20] 20  SpO2:  [95 %-97 %] 95 %  BP: (109-144)/(66-92) 134/87     Weight: 60.2 kg (132 lb 11.5 oz)  Body mass index is 22.77 kg/m².    Intake/Output Summary (Last 24 hours) at 12/4/2022 0926  Last data filed at 12/4/2022 0908  Gross per 24 hour   Intake 920 ml   Output 1650 ml   Net -730 ml      Physical Exam  Vitals and nursing note reviewed.   Constitutional:       General: He is not in acute distress.     Comments: Elderly;  Frail;   HENT:      Head: Normocephalic and atraumatic.   Eyes:      Extraocular Movements: Extraocular movements intact.      Pupils: Pupils are equal, round, and reactive to light.   Cardiovascular:      Rate and Rhythm: Normal rate and regular rhythm.      Pulses: Normal pulses.   Pulmonary:      Effort: Pulmonary effort is normal. No respiratory distress.      Breath sounds:  Normal breath sounds.   Abdominal:      General: Abdomen is flat.      Palpations: Abdomen is soft.      Tenderness: There is no abdominal tenderness.   Musculoskeletal:      Right lower leg: No edema.      Left lower leg: No edema.   Skin:     General: Skin is warm and dry.   Neurological:      Mental Status: He is alert.       Significant Labs: All pertinent labs within the past 24 hours have been reviewed.  CBC:   Recent Labs   Lab 12/02/22 2047 12/03/22  0254   WBC 9.41 10.54   HGB 13.6* 12.9*   HCT 43.5 40.6    273     CMP:   Recent Labs   Lab 12/02/22 2047 12/03/22  0254    136   K 3.9 4.1   CL 98 99   CO2 30* 30*   * 119*   BUN 14 15   CREATININE 0.8 0.9   CALCIUM 9.0 8.9   PROT 7.0 6.8   ALBUMIN 3.3* 3.2*   BILITOT 1.5* 1.8*   ALKPHOS 79 68   AST 31 30   ALT 23 23   ANIONGAP 9 7*       Significant Imaging: I have reviewed all pertinent imaging results/findings within the past 24 hours.

## 2022-12-04 NOTE — PLAN OF CARE
Problem: Occupational Therapy  Goal: Occupational Therapy Goal  Description: Goals to be met by: 12/18/22     Patient will increase functional independence with ADLs by performing:    UE Dressing with Modified [place].  LE Dressing with Modified [place].  Grooming while seated with Modified [place].  Toileting from bedside commode with Modified [place] for hygiene and clothing management.   Bathing from  shower chair/bench with Minimal Assistance.  Upper extremity exercise program x12 reps per handout, with supervision.    Outcome: Ongoing, Progressing

## 2022-12-04 NOTE — PROGRESS NOTES
[facility] - Cardiology [facility] [place] Medicine  Progress Note    Patient Name: Gregorio Bishop  MRN: [id number]  Patient Class: IP- Inpatient   Admission Date: 11/29/2022  Length of Stay: 4 days  Attending Physician: Nicole Jin MD  Primary Care Provider: Renaldo Molina MD        Subjective:     Principal Problem:NSTEMI (non-ST elevated myocardial infarction)        HPI:  81 yo M PMHx CHF, CVA with RLE deficits presents from rehabilitation center to [facility] due to 2 week history of progressive SHORE and associated chest pressure in the substernal region without radiation.  Patient denies any overt feeling of pain, instead feeling chest fullness and chest pressure, mostly when attempting to ambulate at the rehabilitation center.  Patient has also been describing increased dyspnea both on exertion and at rest.  Has difficulty feeling like he gets a full breath in.  Denies abdominal pain, jaw pain, arm pain, nausea, vomiting, diarrhea, fever, chills, cough, sick contacts.  Patient is unsure of what medications he takes.  Patient describes his diet as whatever he feels like eating.  Denies smoking history, reason alcohol use, illicit drugs.  Patient was previously admitted for treatment of congestive heart failure 11/26.    Patient admitted to Hospital Medicine for further evaluation of elevated troponin, shortness of breath without hypoxemia, concerns for NSTEMI.      Overview/Hospital Course:  81 yo M PMHx CHF, CVA with RLE deficits presents from rehabilitation center to [facility] due to 2 week history of progressive SHORE and associated chest pressure in the substernal region without radiation. Pt was fond to have ST elevations, pt is not a candidate for PCI per cardiology. Treated mediaclly with ASA, brilinta, and lovenox. Will stop lovenox after 72 hours. Code stroke called due to abrupt altered mentation. CTH showed no new stroke but showed retained blood products from a previous stroke. No plans for intervention, but to  continue medical therapy. Pt improved overnight. Will add entresto and carvedilol. Plan to dispo back to rehab. Difficulty sleeping at night, added trazodone. One dose seroquel overnight helped, increased trazedone dose to 50 mg. Will try to avoid seroquel due to black box warning. Pt did not need any sleep medication the subsequent night.       Interval History: NAEON. Slept well overnight without use of trazodone. No complaints this morning. Updated wife and daughter at bedside about the plan. Daughter would prefer rehab facility. Will continue to try get patient into the rehab facility.     Review of Systems   Constitutional:  Negative for chills and fever.   HENT:  Negative for rhinorrhea and sore throat.    Respiratory:  Negative for cough, shortness of breath and wheezing.    Cardiovascular:  Negative for chest pain and leg swelling.   Gastrointestinal:  Negative for abdominal pain, blood in stool, constipation, diarrhea, nausea and vomiting.   Genitourinary:  Negative for dysuria and hematuria.   Skin:  Negative for rash.   Neurological:  Negative for dizziness (mild), light-headedness and headaches.   All other systems reviewed and are negative.  Objective:     Vital Signs (Most Recent):  Temp: 97.5 °F (36.4 °C) (12/04/22 0822)  Pulse: 75 (12/04/22 0822)  Resp: 20 (12/04/22 0822)  BP: 134/87 (12/04/22 0822)  SpO2: 95 % (12/04/22 0822) Vital Signs (24h Range):  Temp:  [97.4 °F (36.3 °C)-98.6 °F (37 °C)] 97.5 °F (36.4 °C)  Pulse:  [68-93] 75  Resp:  [16-20] 20  SpO2:  [95 %-97 %] 95 %  BP: (109-144)/(66-92) 134/87     Weight: 60.2 kg (132 lb 11.5 oz)  Body mass index is 22.77 kg/m².    Intake/Output Summary (Last 24 hours) at 12/4/2022 0926  Last data filed at 12/4/2022 0908  Gross per 24 hour   Intake 920 ml   Output 1650 ml   Net -730 ml      Physical Exam  Vitals and nursing note reviewed.   Constitutional:       General: He is not in acute distress.     Comments: Elderly;  Frail;   HENT:      Head:  Normocephalic and atraumatic.   Eyes:      Extraocular Movements: Extraocular movements intact.      Pupils: Pupils are equal, round, and reactive to light.   Cardiovascular:      Rate and Rhythm: Normal rate and regular rhythm.      Pulses: Normal pulses.   Pulmonary:      Effort: Pulmonary effort is normal. No respiratory distress.      Breath sounds: Normal breath sounds.   Abdominal:      General: Abdomen is flat.      Palpations: Abdomen is soft.      Tenderness: There is no abdominal tenderness.   Musculoskeletal:      Right lower leg: No edema.      Left lower leg: No edema.   Skin:     General: Skin is warm and dry.   Neurological:      Mental Status: He is alert.       Significant Labs: All pertinent labs within the past 24 hours have been reviewed.  CBC:   Recent Labs   Lab 12/02/22 2047 12/03/22  0254   WBC 9.41 10.54   HGB 13.6* 12.9*   HCT 43.5 40.6    273     CMP:   Recent Labs   Lab 12/02/22 2047 12/03/22  0254    136   K 3.9 4.1   CL 98 99   CO2 30* 30*   * 119*   BUN 14 15   CREATININE 0.8 0.9   CALCIUM 9.0 8.9   PROT 7.0 6.8   ALBUMIN 3.3* 3.2*   BILITOT 1.5* 1.8*   ALKPHOS 79 68   AST 31 30   ALT 23 23   ANIONGAP 9 7*       Significant Imaging: I have reviewed all pertinent imaging results/findings within the past 24 hours.      Assessment/Plan:      * NSTEMI (non-ST elevated myocardial infarction)  Symptoms: Presented with substernal, non-radiating chest pain, most associated with exertion. Also with associated dyspnea on exertion (and now at rest). No NTG was attempted.  Risk factors: Likely previous atherosclerotic disease (h/o CVA).   Diagnostics: EKG showed sinus rhythm with PVCs, non-specific T wave abnormalities; no over ischemic changes  Troponin uptrending prior to admission: 0.065 > 0.087 > 1.321, BNP 2574  BEBE 3 or 4  A/P: Unclear if new ischemic event is causing further CHF decompensation or if CHF decompensation d/t suboptimal medication regimen, diet  noncompliance etc causing NSTEMI d/t demand mismatch.      -- DAPT load (ASA, Brillinta) and maintenance thereafter  -- Full AC with Lovenox for 72 hours  -- Trend trop to peak  -- Statin: atorvastatin 80 qd  -- B-blocker: n/a, start on discharge  -- ACEi/ARB: losartan 25, stopping and replacing with entresto   -- NTG PRN for chest pain  -- EKG STAT for new chest pain or hemodynamic instability  -- Cardiology consulted, not a good candidate for PCI, treating medically with Lovenox, ASA, and brillinta      QT prolongation  QTc 505  Avoid Qt prolonging drugs    Dementia with behavioral disturbance  delirium precautions  trazodone 50 mg for sleep  No labs until 8 am  Vitals no check between 11pm and 5 am    Goals of care, counseling/discussion  Spoke with family about code status with patient. After discussion, it was decided to make pt DNR to align with his wishes.       Acute decompensated heart failure  Symptoms: 2-3 weeks of progressive SHORE, dyspnea at rest, orthopnea. Previously admitted 11/26 for ADHF, treated with diuretics.  TTE (11/2022) EF 15%, G3LVDD, decreased from study 09/2022 25%, G1LVDD.  Home meds: losartan, furosemide 20 qd  Dry weight: Unknown  Diagnostics: CXR: pulmonary edema; BNP 2574; Lactate 1.2; O2 requirement RA SpO2 high 90s    Initial etiology: Possibly ischemic cardiomyopathy s/o prev CVA and athersclerotic disease. Patient was not told he had CHF before, family at bedside has not heart CHF diagnosis before either.  Decompensation etiology: Unclear if new ischemic event is causing further CHF decompensation or if CHF decompensation d/t suboptimal medication regimen, diet noncompliance etc causing NSTEMI d/t demand mismatch.      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 No signs of new CVA. Per family, at new baseline functionality.    -- Fall precaution  -- dysphagia diet      Frail elderly  PT/OT consulted  Rehab recommended, referrals sent.  Unable to send initially due to telesitter. Discussing with family about HH with PT/OT.   Doing well without telesitter.   Family prefers rehab facility , will continue to try to place patient in rehab    Stroke  -- CODE STROKE called on 11/30, CTH showed old retained blood products from recent stroke. No new intervention at this time.   --Continue Lovenox for 72 hours  -- ASA and brilinta  -- Vascular neurology has seen the patient, no new intervention recommended, asa, brilinta  - Stopped lovenox after 72 hours      VTE Risk Mitigation (From admission, onward)         Ordered     Place sequential compression device  Until discontinued         11/30/22 0256                Discharge Planning   RASHAD: 12/5/2022     Code Status: DNR   Is the patient medically ready for discharge?: No    Reason for patient still in hospital (select all that apply): Treatment  Discharge Plan A: Rehab   Discharge Delays: None known at this time              Jerzy Minaya DO  Department of Hospital Medicine   [facility] - Cardiology Stepdown

## 2022-12-04 NOTE — PT/OT/SLP EVAL
Occupational Therapy    Evaluation and Treatment    Patient Name: Gregorio Bishop   MRN: [id number]  Admit Date: 11/29/2022  Admitting Diagnosis: NSTEMI (non-ST elevated myocardial infarction)   Recent Surgery: * No surgery found *         Recommendations:     Discharge Recommendations: rehabilitation facility  Discharge Equipment Recommendations: wheelchair, BSC, and bath bench (TBD at rehab)  Barriers to discharge: Other (Comment), Inaccessible home environment (inc skilled assistance required; pt has 15 ERNIE in house)    Assessment:     Gregorio Bishop is a 82 y.o. male with a medical diagnosis of NSTEMI (non-ST elevated myocardial infarction). He presents with performance deficits affecting function including weakness, gait instability, decreased upper extremity function, impaired endurance, impaired balance, decreased lower extremity function, decreased coordination, impaired functional mobility, impaired self care skills, impaired cognition, decreased safety awareness, impaired sensation, visual deficits, impaired cardiopulmonary response to activity.     Pt was agreeable to OT eval and tolerated session well. Pt A&Ox2, pleasantly confused with wife at bedside who provides 24/7 supervision at home PTA. Pt req'd  CGA-Min A with fxnl transfers and mobility w/ RW , and  supervision-Min A  w/ ADLs while seated EOC on this date as further detailed below. Pt lives at home in a H with wife and his two sons (40 & 50 y.o. medically compromised 2/2 schizophrenia) with 15 ERNIE w/ bilateral hand rails. At this time, pt would benefit from skilled acute OT services to INC independence and safety w/ ADLs/IADLS, fxnl mobility, and fxnl tasks of choice so that pt may D/C to least restrictive environment w/ DEC risk of falls or readmissions. Due to the stairs within pt's home, pt is not safe to D/C home at this time. Pt would benefit from rehab facility to increase functional strength, activity tolerance, and safety.   Plan:  "    Patient to be seen 4 x/week to address the above listed problems via self-care/home management, therapeutic activities, therapeutic exercises  Plan of Care Expires:    Plan of Care Reviewed with: patient, spouse    Subjective     Communicated with RN prior to session. Patient found up in chair upon OT entry to room, agreeable to evaluation.     Chief Complaint: Abnormal Lab (From [facility] where is stays  for debility. Sent for cardiology evaluation after elevated trops, Trop #1 0.389, Trop #2 0.901, given 324 ASA and 2 sprays Nitro, intermittent Cp for a few days)    Patient/Family Comments/Goals: Return to PLOF at home    Occupational Profile:  Living Environment:Patient lives with their spouse and two sons who are both medically compromised (40 y.o & 50 y.o who have Schizophrenia but are physically able)  in single story home, 15 steps with Bilateral hand rail, tub/shower.   Prior Level of Function: Prior to admission, patient utilizes RW for ambulation of home distances and receives assistance for ADLs as PRN  Roles and Routines: no drive, no medication management (wife takes care of this), and is retired  Equipment Used at Home: walker, rolling, bedside commode, shower chair  DME owned (not currently used):  [employer]  Upon discharge, patient will have assistance from family w/ 24/7 assist.    Pain/Comfort:  Pain Rating 1: 0/10  Pain Rating Post-Intervention 1: 0/10    Objective:   Patient found with: telemetry     General Precautions: Standard, aspiration, fall   Orthopedic Precautions:N/A   Braces: N/A   Respiratory Status:   Room air  Vitals: /71 (BP Location: Left arm, Patient Position: Lying)   Pulse 74   Temp 97.8 °F (36.6 °C) (Oral)   Resp 16   Ht 5' 4.02" (1.626 m)   Wt 60.2 kg (132 lb 11.5 oz)   SpO2 95%   BMI 22.77 kg/m²     Cognitive and Psychosocial Function:   AxOx2 -- Person and Place   Follows Commands/attention: easily distracted by outside stimuli  Communication: " "clear/fluent  Memory: Impaired STM and Poor immediate recall  Safety awareness/insight to disability: impaired   Mood/Affect/Coping skills/emotional control: Appropriate to situation    Hearing: Intact    Vision: blurry vision; pt could not visually track on this date w/ OT. Pt also reports at end of session that he sees "moving rice" (spotters) on the blank wall next to his chair.     Physical Exam:  Postural examination/scapula alignment:    -       Rounded shoulders  -       Forward head  Skin integrity: Visible skin intact     Left UE Right UE   UE Edema absent absent   UE ROM AROM WFL AROM WFL   UE Strength 5/5 5/5    Strength grasp WFL grasp WFL   Sensation LUE INTACT:WFL RUE INTACT: WFL   Fine Motor Coordination:  LUE INTACT: WFL RUE INTACT: WFL   Gross Motor Coordination: LUE INTACT: WFL   limited by fatigue and impaired functional endurance RUE INTACT:WFL, limited by fatigue and impaired functional endurance       Functional Mobility/Transfers:  Sit <> Stand Transfer with contact guard assistance with rolling walker  Bed <> Chair Transfer using Step Transfer technique with minimum assistance with rolling walker  Static Sitting EOB: FAIR+: Takes MINIMAL challenges  Dynamic Sitting EOB: FAIR-: Maintains without assist but is inconsistent  Static Standing Balance: FAIR-: Maintains without assist but is inconsistent  Dynamic Standing Balance: POOR +: Min A to maintain    Activities of Daily Living:  Feeding: set up A while seated EOC; max verbal encouragement to eat. Pt receives 24/7 supervision at home    Grooming: set up A while seated EOC to wash face with wash cloth    Lower Body Dressing: supervision to stephanie b/l socks while seated EOC  Toileting: dependence pt wears depends at home 24/7 2/2 incontinence     AMPAC 6 Click ADL:  AMPAC Total Score: 18    Treatment & Education:  Therapist provided facilitation and instruction of proper body mechanics, energy conservation, and fall prevention strategies " during tasks listed above.  Pt educated on role of OT, POC and goals for therapy  Pt educated on importance of OOB activities with staff member assistance and sitting OOB majority of the day.     Patient left up in chair with all lines intact and call button in reach.    GOALS:   Multidisciplinary Problems       Occupational Therapy Goals          Problem: Occupational Therapy    Goal Priority Disciplines Outcome Interventions   Occupational Therapy Goal     OT, PT/OT Ongoing, Progressing    Description: Goals to be met by: 12/18/22     Patient will increase functional independence with ADLs by performing:    UE Dressing with Modified [place].  LE Dressing with Modified [place].  Grooming while seated with Modified [place].  Toileting from bedside commode with Modified [place] for hygiene and clothing management.   Bathing from  shower chair/bench with Minimal Assistance.  Upper extremity exercise program x12 reps per handout, with supervision.                         History:     Past Medical History:   Diagnosis Date    Hypertension     Stroke        No past surgical history on file.    Time Tracking:     OT Date of Treatment: 12/04/22  OT Start Time: 0900  OT Stop Time: 0926  OT Total Time (min): 26 min  Additional staff present: N/A      Billable Minutes: Evaluation 10 Self Care/Home Management 16    12/4/2022

## 2022-12-04 NOTE — PT/OT/SLP PROGRESS
Physical Therapy Treatment    Patient Name:  Gregorio Bishop   MRN:  [id number]    Recommendations:     Discharge Recommendations:  rehabilitation facility   Discharge Equipment Recommendations: other (see comments) (TBD)   Barriers to discharge: Inaccessible home and Decreased caregiver support    Assessment:     Gregorio Bishop is a 82 y.o. male admitted with a medical diagnosis of NSTEMI (non-ST elevated myocardial infarction).  He presents with the following impairments/functional limitations:  weakness, impaired endurance, impaired functional mobility, impaired self care skills, gait instability, impaired balance, decreased upper extremity function, decreased lower extremity function, decreased coordination, impaired cognition, decreased safety awareness.  Pt motivated, and tolerated treatment very well. Pt however, requires cueing t/o to avoid obstacles, and for safety. Pt will continue to benefit from PT services to improve all deficits noted above. Resume PT POC as indicated.     Rehab Prognosis: Good; patient would benefit from acute skilled PT services to address these deficits and reach maximum level of function.    Recent Surgery: * No surgery found *      Plan:     During this hospitalization, patient to be seen 4 x/week to address the identified rehab impairments via gait training, therapeutic activities, therapeutic exercises, neuromuscular re-education and progress toward the following goals:    Plan of Care Expires:  12/31/22    Subjective     Chief Complaint: none stated  Patient/Family Comments/goals: none stated  Pain/Comfort:  Pain Rating 1: 0/10  Pain Rating Post-Intervention 1: 0/10      Objective:     Communicated with nursing  prior to session.  Patient found up in chair with  (all lines intact and spouse present) upon PT entry to room.     General Precautions: Standard, aspiration, fall   Orthopedic Precautions:spinal precautions   Braces: N/A  Respiratory Status: Room air     Functional  Mobility:  Transfers:  Sit to Stand:  minimum assistance with rolling walker. Cueing for proper hand placement.   Gait: ~20ft with RW and Min-Mod A w/ cueing for navigation, and RW mgmt.     AM-PAC 6 CLICK MOBILITY   Total Score: 17     Treatment & Education:  -Answered all questions/concerns within PTA scope of practice.     Patient left up in chair with all lines intact, call button in reach, nursing notified, and spouse  present..    GOALS:   Multidisciplinary Problems       Physical Therapy Goals          Problem: Physical Therapy    Goal Priority Disciplines Outcome Goal Variances Interventions   Physical Therapy Goal     PT, PT/OT Ongoing, Progressing     Description: Goals to be met by: 12/15/22    Patient will increase functional independence with mobility by performin. Supine to sit with modified independence  2. Sit to supine with modified independence  3. Sit to stand transfer with modified independence  4. Bed to chair transfer with modified independence using LRAD as needed  5. Gait  x 200 feet with supervision using LRAD as needed  6. Ascend/descend 15 stair with bilateral handrails supervision using LRAD as needed  7. Lower extremity exercise program x15 reps per handout, with independence                         Time Tracking:     PT Received On: 22  PT Start Time: 1302     PT Stop Time: 1320  PT Total Time (min): 18 min     Billable Minutes: Gait Training 18    Treatment Type: Treatment  PT/PTA: PTA     PTA Visit Number: 1     2022

## 2022-12-04 NOTE — PLAN OF CARE
Patient alert and oriented X 3, disoriented to time. Family at the bedside. Plan discussed, supported with active listening and questions answered.  Stimulus minimized, patient was pleasant and able to sleep tonight. Patient did not require redirection and  was calm and cooperative throughout the shift. Comfort and safety maintained. Continue to monitor.                                               Problem: Adult Inpatient Plan of Care  Goal: Plan of Care Review  Outcome: Ongoing, Progressing  Goal: Patient-Specific Goal (Individualized)  Outcome: Ongoing, Progressing  Goal: Absence of Hospital-Acquired Illness or Injury  Outcome: Ongoing, Progressing  Goal: Optimal Comfort and Wellbeing  Outcome: Ongoing, Progressing  Goal: Readiness for Transition of Care  Outcome: Ongoing, Progressing     Problem: Fall Injury Risk  Goal: Absence of Fall and Fall-Related Injury  Outcome: Ongoing, Progressing

## 2022-12-04 NOTE — ASSESSMENT & PLAN NOTE
PT/OT consulted  Rehab recommended, referrals sent.  Unable to send initially due to telesitter. Discussing with family about HH with PT/OT.   Doing well without telesitter.   Family prefers rehab facility , will continue to try to place patient in rehab

## 2022-12-05 ENCOUNTER — TELEPHONE (OUTPATIENT)
Dept: PHARMACY | Facility: CLINIC | Age: 82
End: 2022-12-05
Payer: MEDICARE

## 2022-12-05 VITALS
TEMPERATURE: 97 F | WEIGHT: 132.69 LBS | BODY MASS INDEX: 22.65 KG/M2 | SYSTOLIC BLOOD PRESSURE: 109 MMHG | DIASTOLIC BLOOD PRESSURE: 63 MMHG | HEART RATE: 78 BPM | OXYGEN SATURATION: 97 % | RESPIRATION RATE: 17 BRPM | HEIGHT: 64 IN

## 2022-12-05 PROCEDURE — 25000003 PHARM REV CODE 250: Performed by: STUDENT IN AN ORGANIZED HEALTH CARE EDUCATION/TRAINING PROGRAM

## 2022-12-05 PROCEDURE — 99222 1ST HOSP IP/OBS MODERATE 55: CPT | Mod: ,,, | Performed by: FAMILY MEDICINE

## 2022-12-05 PROCEDURE — 99222 PR INITIAL HOSPITAL CARE,LEVL II: ICD-10-PCS | Mod: ,,, | Performed by: FAMILY MEDICINE

## 2022-12-05 PROCEDURE — 25000003 PHARM REV CODE 250

## 2022-12-05 PROCEDURE — 63600175 PHARM REV CODE 636 W HCPCS

## 2022-12-05 PROCEDURE — 99238 HOSP IP/OBS DSCHRG MGMT 30/<: CPT | Mod: GC,,, | Performed by: HOSPITALIST

## 2022-12-05 PROCEDURE — 99238 PR HOSPITAL DISCHARGE DAY,<30 MIN: ICD-10-PCS | Mod: GC,,, | Performed by: HOSPITALIST

## 2022-12-05 RX ORDER — FUROSEMIDE 40 MG/1
40 TABLET ORAL 2 TIMES DAILY
Status: DISCONTINUED | OUTPATIENT
Start: 2022-12-05 | End: 2022-12-05 | Stop reason: HOSPADM

## 2022-12-05 RX ORDER — CARVEDILOL 3.12 MG/1
3.12 TABLET ORAL 2 TIMES DAILY
Qty: 60 TABLET | Refills: 11
Start: 2022-12-05 | End: 2023-10-23 | Stop reason: SDUPTHER

## 2022-12-05 RX ORDER — TALC
6 POWDER (GRAM) TOPICAL NIGHTLY
Refills: 0
Start: 2022-12-05

## 2022-12-05 RX ORDER — DIGOXIN 125 MCG
0.12 TABLET ORAL DAILY
Qty: 30 TABLET | Refills: 11
Start: 2022-12-06 | End: 2023-12-06

## 2022-12-05 RX ORDER — FUROSEMIDE 40 MG/1
40 TABLET ORAL DAILY
Qty: 60 TABLET | Refills: 1
Start: 2022-12-05 | End: 2023-12-05

## 2022-12-05 RX ADMIN — FUROSEMIDE 40 MG: 10 INJECTION, SOLUTION INTRAVENOUS at 08:12

## 2022-12-05 RX ADMIN — TICAGRELOR 90 MG: 90 TABLET ORAL at 08:12

## 2022-12-05 RX ADMIN — POLYETHYLENE GLYCOL 3350 17 G: 17 POWDER, FOR SOLUTION ORAL at 08:12

## 2022-12-05 RX ADMIN — SENNOSIDES AND DOCUSATE SODIUM 1 TABLET: 50; 8.6 TABLET ORAL at 08:12

## 2022-12-05 RX ADMIN — ASPIRIN 81 MG: 81 TABLET, COATED ORAL at 08:12

## 2022-12-05 RX ADMIN — SACUBITRIL AND VALSARTAN 1 TABLET: 24; 26 TABLET, FILM COATED ORAL at 08:12

## 2022-12-05 RX ADMIN — CARVEDILOL 3.12 MG: 3.12 TABLET, FILM COATED ORAL at 08:12

## 2022-12-05 RX ADMIN — DIGOXIN 0.12 MG: 125 TABLET ORAL at 08:12

## 2022-12-05 NOTE — PROGRESS NOTES
[facility] - [facility]iology Step[facility]  Physical Medicine & Rehab  Progress Note    Patient Name: Gregorio Bishop  MRN: [id number]  Admission Date: 11/29/2022  Length of Stay: 5 days  Attending Physician: Nicole Jin MD    Subjective:     Principal Problem:NSTEMI (non-ST elevated myocardial infarction)    Hospital Course:   11/27/22: Participated w/ OT. Bed mob SBA. Sit>stand EOB>RW with MIN A and step transfer bed<>BSC with verbal cuing for safe hand placement during transitional movements, and RLE foot drop noted during task.  R-lateral step with CGA and use of RW. AMPAC 17.       Interval History 12/5/2022:  Patient is seen for follow-up PM&R evaluation and recommendations: Participating with therapy. Mild confusion present. Not impulsive, calm at bedside. No camera sitter.     HPI, Past Medical, Family, and Social History remains the same as documented in the initial encounter.    Scheduled Medications:    aspirin  81 mg Oral Daily    atorvastatin  80 mg Oral QHS    carvediloL  3.125 mg Oral BID    digoxin  0.125 mg Oral Daily    furosemide  40 mg Oral BID    melatonin  6 mg Oral Nightly    polyethylene glycol  17 g Oral Daily    sacubitriL-valsartan  1 tablet Oral BID    senna-docusate 8.6-50 mg  1 tablet Oral BID    ticagrelor  90 mg Oral BID    traZODone  50 mg Oral QHS       Diagnostic Results:   Labs: Reviewed  ECG: Reviewed  CT: Reviewed    PRN Medications: acetaminophen, nitroGLYCERIN, ondansetron, sodium chloride 0.9%    Review of Systems   Constitutional:  Positive for activity change. Negative for fatigue and fever.   HENT:  Negative for trouble swallowing and voice change.    Respiratory:  Negative for cough and shortness of breath.    Cardiovascular:  Negative for chest pain and leg swelling.   Gastrointestinal:  Negative for abdominal distention and abdominal pain.   Genitourinary:  Negative for difficulty urinating and flank pain.   Musculoskeletal:  Positive for gait problem. Negative for  back pain.   Skin:  Negative for color change and rash.   Neurological:  Positive for weakness. Negative for speech difficulty and numbness.   Psychiatric/Behavioral:  Positive for confusion. Negative for agitation.      Objective:     Vital Signs (Most Recent):  Temp: 98 °F (36.7 °C) (12/05/22 1127)  Pulse: 80 (12/05/22 1127)  Resp: 17 (12/05/22 1127)  BP: 127/69 (12/05/22 1127)  SpO2: 95 % (12/05/22 1127)    Vital Signs (24h Range):  Temp:  [97.3 °F (36.3 °C)-98 °F (36.7 °C)] 98 °F (36.7 °C)  Pulse:  [73-85] 80  Resp:  [16-18] 17  SpO2:  [95 %-97 %] 95 %  BP: ()/(67-82) 127/69     Physical Exam  Vitals and nursing note reviewed.   Constitutional:       Appearance: He is well-developed.   HENT:      Head: Normocephalic and atraumatic.      Nose: Nose normal.      Mouth/Throat:      Mouth: Mucous membranes are moist.   Eyes:      General:         Right eye: No discharge.         Left eye: No discharge.      Pupils: Pupils are equal, round, and reactive to light.   Pulmonary:      Effort: Pulmonary effort is normal. No respiratory distress.   Abdominal:      General: There is no distension.      Tenderness: There is no abdominal tenderness.   Musculoskeletal:         General: No deformity.      Cervical back: Neck supple.      Comments: RLE weakness   Skin:     General: Skin is warm and dry.   Neurological:      Mental Status: He is alert.      Sensory: No sensory deficit.      Motor: Weakness present. No abnormal muscle tone.      Gait: Gait abnormal.      Comments: Oriented to self, not location or year  Calm   Following commands   Psychiatric:         Mood and Affect: Mood normal.         Behavior: Behavior normal.       Assessment/Plan:      * NSTEMI (non-ST elevated myocardial infarction)  - Cardiology consulted, not a good candidate for PCI, treating medically with Lovenox, ASA, and brillinta.   - Per Cards Unclear if new ischemic event is causing further CHF decompensation or if CHF decompensation d/t  suboptimal medication regimen, diet noncompliance etc causing NSTEMI d/t demand mismatch.     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     Recommendations  -  Encourage mobility, OOB in chair at least 3 hours per day, and early ambulation as appropriate  -  PT/OT evaluate and treat  -  Pain management  -  Monitor for and prevent skin breakdown and pressure ulcers  · Early mobility, repositioning/weight shifting every 20-30 minutes when sitting, turn patient every 2 hours, proper mattress/overlay and chair cushioning, pressure relief/heel protector boots  -  DVT prophylaxis    -  Reviewed discharge options (IP rehab, SNF, HH therapy, and OP therapy)    PM&R Recommendation:     At this time, the PM&R team has reviewed this patient's ongoing medical case including inpatient diagnosis, medical history, clinical examination, labs, vitals, current social and functional history to provide the post-acute recommendation as follows:     RECOMMENDATIONS: inpatient rehabilitation due to good motivation/participation with therapies, has been determined to tolerate 3 hours of therapy and good potential for recovery.     The patient will be admitted for comprehensive interdisciplinary inpatient rehabilitation to address the impairments due to medical diagnosis of Debility and NSTEMI. The patient will benefit from an inpatient rehabilitation program to promote functional recovery, implement compensatory strategies and will undergo assessment for needs for durable medical equipment for safe discharge to the community. This patient will benefit from a coordinated interdisciplinary rehabilitation program services that require close monitoring and treatment with 24-hour rehabilitative nursing and physical/occupational therapies for 3 hours/day for 5 days/week.This interdisciplinary program will be performed under the  direction of a physiatrist.    MEDICAL STABILITY:     At this time, no barriers for post-acute rehab admission.    We will sign off.     Ivette Vinson NP  Department of Physical Medicine & Rehab   [facility] - Card[facility] Step[facility]

## 2022-12-05 NOTE — PLAN OF CARE
Pt returned to [facility] rehab to continue his therapies. No post acute needs.    Sue Guevara [facility]  Case Management Department  [email or web address]       12/05/22 1638   Final Note   Assessment Type Final Discharge Note   Anticipated Discharge Disposition Rehab   Post-Acute Status   Post-Acute Authorization Placement   Post-Acute Placement Status Set-up Complete/Auth obtained   Discharge Delays None known at this time

## 2022-12-05 NOTE — PLAN OF CARE
Patient alert and oriented X 4, with intermittent  confusion  and forgetfulness  Wife and son at the bedside at the bedside. Plan discussed, supported with active listening and questions answered.  Stimulus minimized, patient was pleasant and able to sleep tonight. Patient did not require redirection and  was calm and cooperative throughout the shift. Comfort and safety maintained. Continue to monitor.    Patient has not required a tele sitter. Tele sitter monitoring discontinued 12/03/22 at 8 pm.    Problem: Adult Inpatient Plan of Care  Goal: Plan of Care Review  Outcome: Ongoing, Progressing  Goal: Patient-Specific Goal (Individualized)  Outcome: Ongoing, Progressing  Goal: Absence of Hospital-Acquired Illness or Injury  Outcome: Ongoing, Progressing  Goal: Optimal Comfort and Wellbeing  Outcome: Ongoing, Progressing  Goal: Readiness for Transition of Care  Outcome: Ongoing, Progressing     Problem: Fall Injury Risk  Goal: Absence of Fall and Fall-Related Injury  Outcome: Ongoing, Progressing     Problem: Skin Injury Risk Increased  Goal: Skin Health and Integrity  Outcome: Ongoing, Progressing

## 2022-12-05 NOTE — PLAN OF CARE
[facility]    FACILITY TRANSFER ORDERS      Patient Name: Gregorio Bishop  YOB: 1940    PCP: Renaldo Molina MD   PCP Address: [address] / [place] [address]  PCP Phone Number: [phone]  PCP Fax: [phone]    Encounter Date: 12/05/2022    Admit to: Inpatient Rehab    Vital Signs:  Routine    Diagnoses:   Active Hospital Problems    Diagnosis  POA    *NSTEMI (non-ST elevated myocardial infarction) [I21.4]  Yes    Delirium [R41.0]  Yes    QT prolongation [R94.31]  Yes    Goals of care, counseling/discussion [Z71.89]  Not Applicable    Acute on chronic combined systolic and diastolic heart failure [I50.43]  Yes    Dysarthria and anarthria [R47.1]  No    Frail elderly [R54]  Yes    Stroke [I63.9]  Yes     September 2021      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       Allergies:Review of patient's allergies indicates:  No Known Allergies    Diet:  Diet Dysphagia Mechanical Soft (IDDSI level 5), thin. Cardiac (<2g Na). Fluid restriction 2L.     Activities: Activity as tolerated    Goals of Care Treatment Preferences:  Code Status: DNR      Nursing: Per Facility     Labs: Per Facility    CONSULTS:    Physical Therapy to evaluate and treat.  and Occupational Therapy to evaluate and treat.    MISCELLANEOUS CARE:  N/a    WOUND CARE ORDERS  None    Medications: Review discharge medications with patient and family and provide education.      Current Discharge Medication List        START taking these medications    Details   carvediloL (COREG) 3.125 MG tablet Take 1 tablet (3.125 mg total) by mouth 2 (two) times daily.  Qty: 60 tablet, Refills: 11    Comments: .      digoxin (LANOXIN) 125 mcg tablet Take 1 tablet (0.125 mg total) by mouth once daily.  Qty: 30 tablet, Refills: 11      melatonin (MELATIN) 3 mg tablet Take 2  tablets (6 mg total) by mouth nightly.  Refills: 0      nitroGLYCERIN (NITROSTAT) 0.4 MG SL tablet Place 1 tablet (0.4 mg total) under the tongue every 5 (five) minutes as needed for Chest pain.  If chest pain is not relieved or worsens 3 to 5 minutes after 1 dose, call 911 and seek immediate emergency medical attention.  Qty: 25 tablet, Refills: 0    Associated Diagnoses: NSTEMI (non-ST elevated myocardial infarction)      sacubitriL-valsartan (ENTRESTO) 24-26 mg per tablet Take 1 tablet by mouth 2 (two) times daily.  Qty: 60 tablet, Refills: 2    Associated Diagnoses: Chronic combined systolic and diastolic heart failure      ticagrelor (BRILINTA) 90 mg tablet Take 1 tablet (90 mg total) by mouth 2 (two) times daily.  Qty: 60 tablet, Refills: 11    Associated Diagnoses: NSTEMI (non-ST elevated myocardial infarction)           CONTINUE these medications which have CHANGED    Details   aspirin (ECOTRIN) 81 MG EC tablet Take 1 tablet (81 mg total) by mouth once daily.  Qty: 90 tablet, Refills: 3    Associated Diagnoses: NSTEMI (non-ST elevated myocardial infarction)      furosemide (LASIX) 40 MG tablet Take 1 tablet (40 mg total) by mouth once daily.  Qty: 60 tablet, Refills: 1           CONTINUE these medications which have NOT CHANGED    Details   atorvastatin (LIPITOR) 80 MG tablet Take 1 tablet (80 mg total) by mouth every evening.  Qty: 90 tablet, Refills: 3    Associated Diagnoses: Dyslipidemia      acetaminophen (TYLENOL) 325 MG tablet Take 650 mg by mouth every 8 (eight) hours as needed.      cyanocobalamin (VITAMIN B-12) 1000 MCG tablet Take 1 tablet (1,000 mcg total) by mouth once daily.  Qty: 30 tablet, Refills: 0    Associated Diagnoses: Fatigue, unspecified type           STOP taking these medications       losartan (COZAAR) 25 MG tablet Comments:   Reason for Stopping:                  Immunizations Administered as of 12/5/2022       No immunizations on file.            This patient has not been Covid  Vaccinated    Some patients may experience side effects after vaccination.  These may include fever, headache, muscle or joint aches.  Most symptoms resolve with 24-48 hours and do not require urgent medical evaluation unless they persist for more than 72 hours or symptoms are concerning for an unrelated medical condition.        CARDIOLOGY REFERRAL PLACED. PLEASE ENSURE FOLLOW UP IS SET UP    _________________________________  Sue Luciano MD  12/05/2022

## 2022-12-05 NOTE — SUBJECTIVE & OBJECTIVE
Interval History 12/5/2022:  Patient is seen for follow-up PM&R evaluation and recommendations: Participating with therapy. Mild confusion present. Not impulsive, calm at bedside. No camera sitter.     HPI, Past Medical, Family, and Social History remains the same as documented in the initial encounter.    Scheduled Medications:    aspirin  81 mg Oral Daily    atorvastatin  80 mg Oral QHS    carvediloL  3.125 mg Oral BID    digoxin  0.125 mg Oral Daily    furosemide  40 mg Oral BID    melatonin  6 mg Oral Nightly    polyethylene glycol  17 g Oral Daily    sacubitriL-valsartan  1 tablet Oral BID    senna-docusate 8.6-50 mg  1 tablet Oral BID    ticagrelor  90 mg Oral BID    traZODone  50 mg Oral QHS       Diagnostic Results: Labs: Reviewed  ECG: Reviewed  CT: Reviewed    PRN Medications: acetaminophen, nitroGLYCERIN, ondansetron, sodium chloride 0.9%    Review of Systems   Constitutional:  Positive for activity change. Negative for fatigue and fever.   HENT:  Negative for trouble swallowing and voice change.    Respiratory:  Negative for cough and shortness of breath.    Cardiovascular:  Negative for chest pain and leg swelling.   Gastrointestinal:  Negative for abdominal distention and abdominal pain.   Genitourinary:  Negative for difficulty urinating and flank pain.   Musculoskeletal:  Positive for gait problem. Negative for back pain.   Skin:  Negative for color change and rash.   Neurological:  Positive for weakness. Negative for speech difficulty and numbness.   Psychiatric/Behavioral:  Positive for confusion. Negative for agitation.    Objective:     Vital Signs (Most Recent):  Temp: 98 °F (36.7 °C) (12/05/22 1127)  Pulse: 80 (12/05/22 1127)  Resp: 17 (12/05/22 1127)  BP: 127/69 (12/05/22 1127)  SpO2: 95 % (12/05/22 1127)    Vital Signs (24h Range):  Temp:  [97.3 °F (36.3 °C)-98 °F (36.7 °C)] 98 °F (36.7 °C)  Pulse:  [73-85] 80  Resp:  [16-18] 17  SpO2:  [95 %-97 %] 95 %  BP: ()/(67-82) 127/69      Physical Exam  Vitals and nursing note reviewed.   Constitutional:       Appearance: He is well-developed.   HENT:      Head: Normocephalic and atraumatic.      Nose: Nose normal.      Mouth/Throat:      Mouth: Mucous membranes are moist.   Eyes:      General:         Right eye: No discharge.         Left eye: No discharge.      Pupils: Pupils are equal, round, and reactive to light.   Pulmonary:      Effort: Pulmonary effort is normal. No respiratory distress.   Abdominal:      General: There is no distension.      Tenderness: There is no abdominal tenderness.   Musculoskeletal:         General: No deformity.      Cervical back: Neck supple.      Comments: RLE weakness   Skin:     General: Skin is warm and dry.   Neurological:      Mental Status: He is alert.      Sensory: No sensory deficit.      Motor: Weakness present. No abnormal muscle tone.      Gait: Gait abnormal.      Comments: Oriented to self, not location or year  Calm   Following commands   Psychiatric:         Mood and Affect: Mood normal.         Behavior: Behavior normal.     NEUROLOGICAL EXAMINATION:     CRANIAL NERVES     CN III, IV, VI   Pupils are equal, round, and reactive to light.

## 2022-12-05 NOTE — DISCHARGE SUMMARY
[facility]in - Cardiology [facility] Medicine  Discharge Summary      Patient Name: Gregorio Bishop  MRN: [id number]  VERONICA: [id number]  Patient Class: IP- Inpatient  Admission Date: 11/29/2022  Hospital Length of Stay: 5 days  Discharge Date and Time:  12/05/2022 3:03 PM  Attending Physician: Nicole Jin MD   Discharging Provider: Sue Luciano MD  Primary Care Provider: Renaldo Molina MD  Hospital Medicine Team: [facility] HOSP MED 2 Sue Luciano MD  Primary Care Team: [facility] HOSP MED 2    HPI:   81 yo M PMHx CHF, CVA with RLE deficits presents from rehabilitation center to [facility] due to 2 week history of progressive SHORE and associated chest pressure in the substernal region without radiation.  Patient denies any overt feeling of pain, instead feeling chest fullness and chest pressure, mostly when attempting to ambulate at the rehabilitation center.  Patient has also been describing increased dyspnea both on exertion and at rest.  Has difficulty feeling like he gets a full breath in.  Denies abdominal pain, jaw pain, arm pain, nausea, vomiting, diarrhea, fever, chills, cough, sick contacts.  Patient is unsure of what medications he takes.  Patient describes his diet as whatever he feels like eating.  Denies smoking history, reason alcohol use, illicit drugs.  Patient was previously admitted for treatment of congestive heart failure 11/26.    Patient admitted to Hospital Medicine for further evaluation of elevated troponin, shortness of breath without hypoxemia, concerns for NSTEMI.    * No surgery found *      Hospital Course:   81 yo M PMHx CHF, CVA with RLE deficits presents from rehabilitation center to [facility] due to 2 week history of progressive SHORE and associated chest pressure in the substernal region without radiation. Pt was found to have ST elevations, however was deemed not a candidate for intervention after evaluation by Cardiology. He was initiated on medical management including ASA, Brilinta, high dose  statin, Coreg and completed 3 days of lovenox. Patient had abrupt change in mentation on 11/30. Imaging showed mild blood products associated with prior recent left occipital deep white matter infarct but otherwise no new findings. Vascular neurology stated no intervention needed and to continue current therapies. Patient continued to clinically improve over the next few days. Had some agitation and delirium overnight that resolved with adequate sleep and delirium precautions. Patient was stable on discharge to inpatient rehab with CAD and CHF GDMT and referral for Cardiology followup.     Physical Exam  Vitals and nursing note reviewed.   Constitutional:       General: He is not in acute distress.     Comments: Elderly; Frail  HENT:      Head: Normocephalic and atraumatic. Poor dentition  Eyes:      Extraocular Movements: Extraocular movements intact.      Pupils: Pupils are equal, round, and reactive to light.   Cardiovascular:      Rate and Rhythm: Normal rate and regular rhythm.      Pulses: Normal pulses.   Pulmonary:      Effort: Pulmonary effort is normal. No respiratory distress.      Breath sounds: Normal breath sounds.   Abdominal:      General: Abdomen is flat.      Palpations: Abdomen is soft.      Tenderness: There is no abdominal tenderness.   Musculoskeletal:      Right lower leg: No edema.      Left lower leg: No edema.   Skin:     General: Skin is warm and dry.   Neurological:      Mental Status: He is alert. Oriented to self, place and month.    Goals of Care Treatment Preferences:  Code Status: DNR      Consults:   Consults (From admission, onward)        Status Ordering Provider     Inpatient consult to Physical Medicine Rehab  Once        Provider:  (Not yet assigned)    Completed ALMA ROSA MACEDO consult to case management  Once        Provider:  (Not yet assigned)    Acknowledged WILLIAN LAFLEUR     Inpatient consult to Social Work/Case Management  Once        Provider:  (Not yet  assigned)    WILLIAN Santacruz     Inpatient consult to Cardiology  Once        Provider:  (Not yet assigned)    SUDHA Ro-ON          Acute on chronic combined systolic and diastolic heart failure  Symptoms: 2-3 weeks of progressive SHORE, dyspnea at rest, orthopnea. Previously admitted 11/26 for ADHF, treated with diuretics.  TTE (11/2022) EF 15%, G3LVDD, decreased from study 09/2022 25%, G1LVDD.  Home meds: losartan, furosemide 20 qd  Dry weight: Unknown  Diagnostics: CXR: pulmonary edema; BNP 2574; Lactate 1.2; O2 requirement RA SpO2 high 90s    Initial etiology: Possibly ischemic cardiomyopathy s/o prev CVA and atherosclerotic disease. Patient was not told he had CHF before, family at bedside has not heart CHF diagnosis before either.  Decompensation etiology: Unclear if new ischemic event is causing further CHF decompensation or if CHF decompensation d/t suboptimal medication regimen, diet noncompliance etc causing NSTEMI d/t demand mismatch.      -- Lasix 40 IV BID transitioned to Lasix 40mg PO q.d.  -- Daily weights (standing if tolerated)  -- Strict I/Os; goal net negative 1.5 - 2 L / day  -- Fluid restriction at 1500mL  -- Cardiac diet w/ 2 g Na restriction when diet tolerated  -- cardiology consulted, recommended digoxin 0.125mg qd after load  -- Started entresto and carvedilol, can be up-titrated outpatient        Final Active Diagnoses:    Diagnosis Date Noted POA    PRINCIPAL PROBLEM:  NSTEMI (non-ST elevated myocardial infarction) [I21.4] 11/30/2022 Yes    Delirium [R41.0] 12/03/2022 Yes    QT prolongation [R94.31] 12/03/2022 Yes    Goals of care, counseling/discussion [Z71.89] 12/01/2022 Not Applicable    Acute on chronic combined systolic and diastolic heart failure [I50.43] 11/30/2022 Yes    Dysarthria and anarthria [R47.1] 11/30/2022 No    Frail elderly [R54]  Yes    Stroke [I63.9]  Yes    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] 09/08/2021  Not Applicable     Chronic    Debility [R53.81] 11/20/2020 Yes      Problems Resolved During this Admission:       Discharged Condition: stable    Disposition: Rehab Facility    Follow Up:    Patient Instructions:      Ambulatory referral/consult to Cardiology   Standing Status: Future   Referral Priority: Routine Referral Type: Consultation   Referral Reason: Specialty Services Required   Requested Specialty: Cardiology   Number of Visits Requested: 1       Significant Diagnostic Studies: Labs:   CMP   Recent Labs   Lab 12/04/22  1119      K 4.0      CO2 28   *   BUN 16   CREATININE 1.0   CALCIUM 8.6*   PROT 7.0   ALBUMIN 3.2*   BILITOT 1.6*   ALKPHOS 71   AST 52*   ALT 38   ANIONGAP 7*    and CBC   Recent Labs   Lab 12/04/22  1119   WBC 9.91   HGB 14.3   HCT 44.8          Pending Diagnostic Studies:     Procedure Component Value Units Date/Time    CBC auto differential [[id number]] Collected: 12/02/22 0532    Order Status: Sent Lab Status: In process Updated: 12/02/22 0532    Specimen: Blood     Comprehensive metabolic panel [[id number]] Collected: 12/02/22 0532    Order Status: Sent Lab Status: In process Updated: 12/02/22 0532    Specimen: Blood     Digoxin level [[id number]] Collected: 12/02/22 0532    Order Status: Sent Lab Status: In process Updated: 12/02/22 0532    Specimen: Blood          Medications:  Reconciled Home Medications:      Medication List      START taking these medications    carvediloL 3.125 MG tablet  Commonly known as: COREG  Take 1 tablet (3.125 mg total) by mouth 2 (two) times daily.     digoxin 125 mcg tablet  Commonly known as: LANOXIN  Take 1 tablet (0.125 mg total) by mouth once daily.  Start taking on: December 6, 2022     ENTRESTO 24-26 mg per tablet  Generic drug: sacubitriL-valsartan  Take 1 tablet by mouth 2 (two) times daily.     melatonin 3 mg tablet  Commonly known as: MELATIN  Take 2 tablets (6 mg total) by mouth nightly.     nitroGLYCERIN 0.4 MG SL  tablet  Commonly known as: NITROSTAT  Place 1 tablet (0.4 mg total) under the tongue every 5 (five) minutes as needed for Chest pain.  If chest pain is not relieved or worsens 3 to 5 minutes after 1 dose, call 911 and seek immediate emergency medical attention.     ticagrelor 90 mg tablet  Commonly known as: BRILINTA  Take 1 tablet (90 mg total) by mouth 2 (two) times daily.        CHANGE how you take these medications    furosemide 40 MG tablet  Commonly known as: LASIX  Take 1 tablet (40 mg total) by mouth once daily.  What changed:   · medication strength  · how much to take        CONTINUE taking these medications    acetaminophen 325 MG tablet  Commonly known as: TYLENOL  Take 650 mg by mouth every 8 (eight) hours as needed.     aspirin 81 MG EC tablet  Commonly known as: ECOTRIN  Take 1 tablet (81 mg total) by mouth once daily.     atorvastatin 80 MG tablet  Commonly known as: LIPITOR  Take 1 tablet (80 mg total) by mouth every evening.     cyanocobalamin 1000 MCG tablet  Commonly known as: VITAMIN B-12  Take 1 tablet (1,000 mcg total) by mouth once daily.        STOP taking these medications    losartan 25 MG tablet  Commonly known as: COZAAR            Indwelling Lines/Drains at time of discharge:   Lines/Drains/Airways     Drain  Duration           Male External Urinary Catheter 11/29/22 2352 Medium 5 days                Time spent on the discharge of patient: 35 minutes         Sue Luciano MD  Department of Hospital Medicine  [facility] - Cardiology Stepdown

## 2022-12-06 NOTE — NURSING
IV hep lock and telemetry box removed.  Family at bedside, discharge instructions given. Transport ready for .  All valuables with pt. And family

## 2022-12-10 ENCOUNTER — HOSPITAL ENCOUNTER (OUTPATIENT)
Dept: RADIOLOGY | Facility: HOSPITAL | Age: 82
Discharge: HOME | End: 2022-12-10
Attending: PHYSICAL MEDICINE & REHABILITATION
Payer: MEDICARE

## 2022-12-10 PROCEDURE — 71045 X-RAY EXAM CHEST 1 VIEW: CPT | Mod: 26,,, | Performed by: RADIOLOGY

## 2022-12-10 PROCEDURE — 71045 XR CHEST 1 VIEW: ICD-10-PCS | Mod: 26,,, | Performed by: RADIOLOGY

## 2022-12-11 ENCOUNTER — HOSPITAL ENCOUNTER (OUTPATIENT)
Dept: RADIOLOGY | Facility: HOSPITAL | Age: 82
Discharge: HOME | End: 2022-12-11
Attending: PHYSICAL MEDICINE & REHABILITATION
Payer: MEDICARE

## 2022-12-11 PROCEDURE — 71045 XR CHEST 1 VIEW: ICD-10-PCS | Mod: 26,,, | Performed by: RADIOLOGY

## 2022-12-11 PROCEDURE — 71045 X-RAY EXAM CHEST 1 VIEW: CPT | Mod: 26,,, | Performed by: RADIOLOGY

## 2022-12-22 ENCOUNTER — PATIENT MESSAGE (OUTPATIENT)
Dept: PRIMARY CARE CLINIC | Facility: CLINIC | Age: 82
End: 2022-12-22
Payer: MEDICARE

## 2022-12-22 ENCOUNTER — OFFICE VISIT (OUTPATIENT)
Dept: CARDIOLOGY | Facility: CLINIC | Age: 82
End: 2022-12-22
Payer: MEDICARE

## 2022-12-22 VITALS
HEART RATE: 68 BPM | BODY MASS INDEX: 22.53 KG/M2 | DIASTOLIC BLOOD PRESSURE: 70 MMHG | OXYGEN SATURATION: 96 % | HEIGHT: 64 IN | WEIGHT: 132 LBS | SYSTOLIC BLOOD PRESSURE: 100 MMHG

## 2022-12-22 DIAGNOSIS — I63.9 CEREBROVASCULAR ACCIDENT (CVA), UNSPECIFIED MECHANISM: Primary | ICD-10-CM

## 2022-12-22 DIAGNOSIS — I69.351 HEMIPLGA FOLLOWING CEREBRAL INFRC AFF RIGHT DOMINANT SIDE: Chronic | ICD-10-CM

## 2022-12-22 DIAGNOSIS — I10 ESSENTIAL HYPERTENSION: ICD-10-CM

## 2022-12-22 DIAGNOSIS — R79.89 ELEVATED TROPONIN: ICD-10-CM

## 2022-12-22 DIAGNOSIS — G81.91 ACUTE RIGHT HEMIPARESIS: ICD-10-CM

## 2022-12-22 DIAGNOSIS — I21.4 NSTEMI (NON-ST ELEVATED MYOCARDIAL INFARCTION): Primary | ICD-10-CM

## 2022-12-22 DIAGNOSIS — R41.0 DELIRIUM: ICD-10-CM

## 2022-12-22 DIAGNOSIS — I50.43 ACUTE ON CHRONIC COMBINED SYSTOLIC AND DIASTOLIC HEART FAILURE: ICD-10-CM

## 2022-12-22 PROCEDURE — 99214 PR OFFICE/OUTPT VISIT, EST, LEVL IV, 30-39 MIN: ICD-10-PCS | Mod: S$PBB,,, | Performed by: INTERNAL MEDICINE

## 2022-12-22 PROCEDURE — 99999 PR PBB SHADOW E&M-EST. PATIENT-LVL III: ICD-10-PCS | Mod: PBBFAC,,, | Performed by: INTERNAL MEDICINE

## 2022-12-22 PROCEDURE — 99213 OFFICE O/P EST LOW 20 MIN: CPT | Mod: PBBFAC | Performed by: INTERNAL MEDICINE

## 2022-12-22 PROCEDURE — 99999 PR PBB SHADOW E&M-EST. PATIENT-LVL III: CPT | Mod: PBBFAC,,, | Performed by: INTERNAL MEDICINE

## 2022-12-22 PROCEDURE — 99214 OFFICE O/P EST MOD 30 MIN: CPT | Mod: S$PBB,,, | Performed by: INTERNAL MEDICINE

## 2022-12-22 NOTE — PROGRESS NOTES
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  Since his last visit he developed a CVA with left arm weakness and was at a rehab hospital.  On November 30, 2022, he was sent to the emergency room at [facility] from rehab for weakness and abnormal troponin.  Chest.  His EKG was concerning for injury pattern.  He was was deemed not a good candidate for revascularization and therefore, medical therapy was started with DAPT.  His peak troponin was 1.45.  He denies any chest pain or shortness of breath today.  He is here with his son.  Patient is not eating well because he does not not like the bland food.  He denies any bleeding.  He denies any dizziness.    Medications  Current Outpatient Medications   Medication Sig Dispense Refill    acetaminophen (TYLENOL) 325 MG tablet Take 650 mg by mouth every 8 (eight) hours as needed.      aspirin (ECOTRIN) 81 MG EC tablet Take 1 tablet (81 mg total) by mouth once daily. 90 tablet 3    atorvastatin (LIPITOR) 80 MG tablet Take 1 tablet (80 mg total) by mouth every evening. 90 tablet 3    carvediloL (COREG) 3.125 MG tablet Take 1 tablet (3.125 mg total) by mouth 2 (two) times daily. 60 tablet 11    cyanocobalamin (VITAMIN B-12) 1000 MCG tablet Take 1 tablet (1,000 mcg total) by mouth once daily. 30 tablet 0    digoxin (LANOXIN) 125 mcg tablet Take 1 tablet  (0.125 mg total) by mouth once daily. 30 tablet 11    furosemide (LASIX) 40 MG tablet Take 1 tablet (40 mg total) by mouth once daily. 60 tablet 1    melatonin (MELATIN) 3 mg tablet Take 2 tablets (6 mg total) by mouth nightly.  0    nitroGLYCERIN (NITROSTAT) 0.4 MG SL tablet Place 1 tablet (0.4 mg total) under the tongue every 5 (five) minutes as needed for Chest pain.  If chest pain is not relieved or worsens 3 to 5 minutes after 1 dose, call 911 and seek immediate emergency medical attention. 25 tablet 0    sacubitriL-valsartan (ENTRESTO) 24-26 mg per tablet Take 1 tablet by mouth 2 (two) times daily. 60 tablet 2    ticagrelor (BRILINTA) 90 mg tablet Take 1 tablet (90 mg total) by mouth 2 (two) times daily. 60 tablet 11     No current facility-administered medications for this visit.      Prior to Admission medications    Medication Sig Start Date End Date Taking? Authorizing Provider   acetaminophen (TYLENOL) 325 MG tablet Take 650 mg by mouth every 8 (eight) hours as needed. 9/23/21  Yes Historical Provider   aspirin (ECOTRIN) 81 MG EC tablet Take 1 tablet (81 mg total) by mouth once daily. 12/1/22 12/1/23 Yes Nicole Jin MD   atorvastatin (LIPITOR) 80 MG tablet Take 1 tablet (80 mg total) by mouth every evening. 11/21/22 11/21/23 Yes Carie Blevins NP   carvediloL (COREG) 3.125 MG tablet Take 1 tablet (3.125 mg total) by mouth 2 (two) times daily. 12/5/22 12/5/23 Yes Sue uLciano MD   cyanocobalamin (VITAMIN B-12) 1000 MCG tablet Take 1 tablet (1,000 mcg total) by mouth once daily. 11/21/22  Yes Carie Blevins NP   digoxin (LANOXIN) 125 mcg tablet Take 1 tablet (0.125 mg total) by mouth once daily. 12/6/22 12/6/23 Yes uSe Luciano MD   furosemide (LASIX) 40 MG tablet Take 1 tablet (40 mg total) by mouth once daily. 12/5/22 12/5/23 Yes Sue Luciano MD   melatonin (MELATIN) 3 mg tablet Take 2 tablets (6 mg total) by mouth nightly. 12/5/22  Yes Sue Luciano MD   nitroGLYCERIN (NITROSTAT) 0.4 MG SL  tablet Place 1 tablet (0.4 mg total) under the tongue every 5 (five) minutes as needed for Chest pain.  If chest pain is not relieved or worsens 3 to 5 minutes after 1 dose, call 911 and seek immediate emergency medical attention. 12/1/22 12/1/23 Yes Nicole Jin MD   sacubitriL-valsartan (ENTRESTO) 24-26 mg per tablet Take 1 tablet by mouth 2 (two) times daily. 12/1/22  Yes Nicole Jin MD   ticagrelor (BRILINTA) 90 mg tablet Take 1 tablet (90 mg total) by mouth 2 (two) times daily. 12/1/22 12/1/23 Yes Nicole Jin MD         History  Past Medical History:   Diagnosis Date    Hypertension     Stroke      No past surgical history on file.  Social History     Socioeconomic History    Marital status:    Tobacco Use    Smoking status: Never    Smokeless tobacco: Never   Substance and Sexual Activity    Alcohol use: Not Currently     Comment: no alcohol for 6 months    Drug use: Never    Sexual activity: Not Currently     Social Determinants of Health     Financial Resource Strain: Low Risk     Difficulty of Paying Living Expenses: Not hard at all   Food Insecurity: No Food Insecurity    Worried About Running Out of Food in the Last Year: Never true    Ran Out of Food in the Last Year: Never true   Transportation Needs: No Transportation Needs    Lack of Transportation (Medical): No    Lack of Transportation (Non-Medical): No   Physical Activity: Inactive    Days of Exercise per Week: 0 days    Minutes of Exercise per Session: 0 min   Stress: Stress Concern Present    Feeling of Stress : Rather much   Social Connections: Moderately Integrated    Frequency of Communication with Friends and Family: More than three times a week    Frequency of Social Gatherings with Friends and Family: Three times a week    Attends [employer] Services: More than 4 times per year    Active Member of Clubs or Organizations: No    Attends Club or Organization Meetings: Never    Marital Status:    Housing Stability:  Low Risk     Unable to Pay for Housing in the Last Year: No    Number of Places Lived in the Last Year: 1    Unstable Housing in the Last Year: No         Allergies  Review of patient's allergies indicates:  No Known Allergies      Review of Systems   Review of Systems   Constitutional: Positive for weight loss. Negative for decreased appetite and fever.   HENT:  Negative for congestion and nosebleeds.    Eyes:  Negative for double vision, vision loss in left eye, vision loss in right eye and visual disturbance.   Cardiovascular:  Negative for chest pain, claudication, cyanosis, dyspnea on exertion, irregular heartbeat, leg swelling, near-syncope, orthopnea, palpitations, paroxysmal nocturnal dyspnea and syncope.   Respiratory:  Negative for cough, hemoptysis, shortness of breath, sleep disturbances due to breathing, snoring, sputum production and wheezing.    Endocrine: Negative for cold intolerance and heat intolerance.   Skin:  Negative for nail changes and rash.   Musculoskeletal:  Negative for joint pain, muscle cramps, muscle weakness and myalgias.   Gastrointestinal:  Negative for change in bowel habit, heartburn, hematemesis, hematochezia, hemorrhoids and melena.   Neurological:  Negative for dizziness, focal weakness and headaches.       Physical Exam  Wt Readings from Last 1 Encounters:   12/22/22 59.9 kg (132 lb)     BP Readings from Last 3 Encounters:   12/22/22 100/70   12/05/22 109/63   11/28/22 124/78     Pulse Readings from Last 1 Encounters:   12/22/22 68     Body mass index is 22.66 kg/m².    Physical Exam  Vitals reviewed.   Constitutional:       Appearance: He is cachectic.   Neck:      Vascular: No carotid bruit.      Comments: JVP is low.  Cardiovascular:      Rate and Rhythm: Normal rate and regular rhythm.      Pulses:           Carotid pulses are 2+ on the right side and 2+ on the left side.       Radial pulses are 2+ on the right side and 2+ on the left side.        Dorsalis pedis pulses  are 2+ on the right side and 2+ on the left side.      Heart sounds: S1 normal and S2 normal. No murmur heard.  Pulmonary:      Breath sounds: Normal breath sounds.   Abdominal:      General: Bowel sounds are normal. There is no distension.      Palpations: Abdomen is soft. There is no mass.   Skin:     General: Skin is warm and dry.   Neurological:      Mental Status: He is alert and oriented to person, place, and time.           Assessment  1. NSTEMI (non-ST elevated myocardial infarction)  Stable no angina.  Ejection fraction 15%.  Not a candidate for revascularization or ICD.    2. Acute on chronic combined systolic and diastolic heart failure  Compensated    3. Essential hypertension  Stable on medication    4. Elevated troponin  Resolved        Plan and Discussion  Reviewed ER and hospital records from his admission last month.  Discussed that he is currently on good medical regimen for goal-directed medical therapy.  Discussed that he is cachectic.  He is not a candidate for revascularization therapy or ICD.  Will continue current medical therapy.    Follow Up  3 months      Khoa Cornell MD, F.A.C.C, F.S.C.A.I.        35 minutes were spent in chart review, documentation and review of results, and evaluation, treatment, and counseling of patient on the same day of service.

## 2022-12-25 ENCOUNTER — HOSPITAL ENCOUNTER (EMERGENCY)
Facility: OTHER | Age: 82
Discharge: HOME | End: 2022-12-25
Attending: EMERGENCY MEDICINE
Payer: MEDICARE

## 2022-12-25 VITALS
OXYGEN SATURATION: 99 % | TEMPERATURE: 100 F | WEIGHT: 117 LBS | HEIGHT: 69 IN | SYSTOLIC BLOOD PRESSURE: 123 MMHG | HEART RATE: 103 BPM | BODY MASS INDEX: 17.33 KG/M2 | DIASTOLIC BLOOD PRESSURE: 78 MMHG | RESPIRATION RATE: 18 BRPM

## 2022-12-25 DIAGNOSIS — R09.A2 FOREIGN BODY SENSATION IN THROAT: Primary | ICD-10-CM

## 2022-12-25 DIAGNOSIS — U07.1 COVID-19 VIRUS INFECTION: ICD-10-CM

## 2022-12-25 DIAGNOSIS — U07.1 COVID-19 VIRUS DETECTED: ICD-10-CM

## 2022-12-25 DIAGNOSIS — R05.9 COUGH: ICD-10-CM

## 2022-12-25 LAB
CTP QC/QA: YES
SARS-COV-2 RDRP RESP QL NAA+PROBE: POSITIVE

## 2022-12-25 PROCEDURE — 99283 EMERGENCY DEPT VISIT LOW MDM: CPT | Mod: 25

## 2022-12-25 PROCEDURE — 87635 SARS-COV-2 COVID-19 AMP PRB: CPT | Performed by: EMERGENCY MEDICINE

## 2022-12-25 RX ORDER — BENZONATATE 100 MG/1
100 CAPSULE ORAL 3 TIMES DAILY PRN
Qty: 20 CAPSULE | Refills: 0 | Status: SHIPPED | OUTPATIENT
Start: 2022-12-25 | End: 2023-01-04

## 2022-12-25 RX ORDER — GLUCAGON 1 MG
1 KIT INJECTION
Status: DISCONTINUED | OUTPATIENT
Start: 2022-12-25 | End: 2022-12-25

## 2022-12-25 RX ORDER — NITROGLYCERIN 0.4 MG/1
0.4 TABLET SUBLINGUAL
Status: DISCONTINUED | OUTPATIENT
Start: 2022-12-25 | End: 2022-12-25

## 2022-12-26 NOTE — DISCHARGE INSTRUCTIONS
You may have sensation of foreign body over the next couple of days.  As long as you are able to hold down food and liquid it will resolve.  Follow-up with your primary care physician as well as specialists as per your recent hospital discharge.

## 2022-12-26 NOTE — ED PROVIDER NOTES
"SCRIBE #1 NOTE: I, Torito Frankel, am scribing for, and in the presence of,  Reginald Townsend DO. I have scribed the following portions of the note - Other sections scribed: HPI, ROS, PE.       Source of History:  Patient    Chief complaint:  Foreign Body In Throat (After eating turkey. )      HPI:  Gregorio Bishop is a 82 y.o. male presenting with residual foreign body sensation after briefly choking on a piece of turkey. Family at bedside recalls the patient being unable to breathe well after attempting to swallow, and later spat the food back out with administration of a Heimlich maneuver. Patient now reports a scratchy, uncomfortable sensation but has been able to hold down sips of water. While he does have a dry cough, patient states this has been ongoing for five days. This began several days after being discharged following hospitalization for an NSTEMI. Today he denies any fever, nausea, or vomiting. He is not vaccinated against COVID-19.    This is the extent to the patients complaints today here in the emergency department.    ROS: As per HPI and below:  Constitutional: No fever.  No chills.  Eyes: No visual changes.   ENT: No sore throat. No ear pain. No trouble swallowing.  Pulmonary: Notes cough. Notes choking (resolved).  Urinary: No abnormal urination.  MSK: No back pain. No joint pain.   Integument: No rashes or lesions.    Review of patient's allergies indicates:  No Known Allergies    PMH:  As per HPI and below:  Past Medical History:   Diagnosis Date    Hypertension     Stroke      History reviewed. No pertinent surgical history.    Social History     Tobacco Use    Smoking status: Never    Smokeless tobacco: Never   Substance Use Topics    Alcohol use: Not Currently     Comment: no alcohol for 6 months    Drug use: Never       Physical Exam:    /76 (BP Location: Left arm, Patient Position: Sitting)   Pulse 108   Temp 99.5 °F (37.5 °C)   Resp 18   Ht 5' 9" (1.753 m)   Wt 53.1 kg (117 " lb)   SpO2 98%   BMI 17.28 kg/m²   Nursing note and vital signs reviewed.  Constitutional: No acute distress.  Nontoxic  Eyes: No conjunctival injection. Extraocular muscles intact.  ENT: Normal phonation.  Musculoskeletal: Good range of motion all joints.  No deformities.  Neck supple.  No meningismus. Neurovascularly intact.  Skin: No rashes seen.  Good turgor.  No abrasions.  No ecchymoses.  Psych:  Alert and oriented x 3.  Appropriate, conversant.      I decided to obtain the patient's medical records.  Summary of Medical Records:  11/29/2022-12/05/2022 5 day hospitalization with presentation in the emergency department of dyspnea on exertion associated chest pressure.  Had elevated troponins however was not deemed a candidate for intervention after evaluation by Cardiology.  Managed with medical management.    MDM/ Differential Dx:   82 y.o. male with brought in by paramedics for concern of foreign body in the throat.  Was eating [place] dinner when started choking on turkey.  Family member performed Heimlich and stated he coughed up most of the turkey.  Patient still has sensation in his throat.  He also complains of some persistent coughing but states this is been the same over the last week since he was discharged from hospital after being there for evaluation of stroke as well as cardiac.  Denies any worsening shortness of breath or fatigue.  Will get a chest x-ray to rule out pneumonia as well as COVID.  Will do a p.o. challenge to see if there is any further concern of retained foreign body in the throat.    ED Course as of 12/25/22 1925   Sun Dec 25, 2022   1820 Patient tolerated p.o. challenge with fluid as well as food. [SM]   1836 X-Ray Chest AP Portable  Chest x-ray independently interpreted by myself shows normal heart size, elevated left hemidiaphragm.  No pneumothorax.  No opacities noted.  No bony abnormality.  Overall impression no significant disease. [SM]   1857 SARS-CoV-2 RNA,  Amplification, Qual(!): Positive [SM]   1923 Discussed results with the patient as well as his son who is at bedside.  Patient's oxygen levels are 100%.  Do not feel any workup is indicated.  His symptoms have been going on for over a week no Paxlovid is indicated.  Will discharge with prescription for Tessalon Perles to follow up with primary care as an outpatient.      No further workup is indicated in the emergency department today.  I updated pt regarding results and I counseled pt regarding supportive care measures.  Diagnosis and treatment plan explained to patient. I have answered all questions and the patient is satisfied with the plan of care. Patient discharged home in stable condition.  [SM]      ED Course User Index  [SM] Reginald Townsend DO              Scribe Attestation:   Scribe #1: I performed the above scribed service and the documentation accurately describes the services I performed. I attest to the accuracy of the note.  Physician Attestation for Scribe: I, Dr Reginald Townsend, reviewed documentation as scribed in my presence, which is both accurate and complete.    Diagnostic Impression:    1. Foreign body sensation in throat    2. Cough    3. COVID-19 virus infection         ED Disposition Condition    Discharge Stable            ED Prescriptions       Medication Sig Dispense Start Date End Date Auth. Provider    benzonatate (TESSALON) 100 MG capsule Take 1 capsule (100 mg total) by mouth 3 (three) times daily as needed for Cough. 20 capsule 12/25/2022 1/4/2023 Reginald Townsend,           Follow-up Information       Follow up With Specialties Details Why Contact Info    Renaldo Molina MD Internal Medicine In 1 week  [address] [facility] [facility] [address]  [address] FLO[address]  [facility] [address]  [phone]      [facility] Emergency Dept Emergency Medicine  If symptoms worsen [address] [id number] [phone]             Reginald Townsend DO  12/25/22 4275

## 2022-12-26 NOTE — ED TRIAGE NOTES
"Patient to ED with c/o " possible foreign body in throat s/p eating turkey . " Heimlich maneuver performed pta . Patient swallows without difficulty - oxygen saturation   97% on room air .   "

## 2023-01-26 ENCOUNTER — DOCUMENT SCAN (OUTPATIENT)
Dept: HOME HEALTH SERVICES | Facility: HOSPITAL | Age: 83
End: 2023-01-26
Payer: MEDICARE

## 2023-01-26 PROCEDURE — 99499 NO LOS: ICD-10-PCS | Mod: ,,, | Performed by: STUDENT IN AN ORGANIZED HEALTH CARE EDUCATION/TRAINING PROGRAM

## 2023-01-26 PROCEDURE — 99499 UNLISTED E&M SERVICE: CPT | Mod: ,,, | Performed by: STUDENT IN AN ORGANIZED HEALTH CARE EDUCATION/TRAINING PROGRAM

## 2023-02-02 ENCOUNTER — OFFICE VISIT (OUTPATIENT)
Dept: NEUROLOGY | Facility: CLINIC | Age: 83
End: 2023-02-02
Payer: MEDICARE

## 2023-02-02 ENCOUNTER — EXTERNAL HOME HEALTH (OUTPATIENT)
Dept: HOME HEALTH SERVICES | Facility: HOSPITAL | Age: 83
End: 2023-02-02
Payer: MEDICARE

## 2023-02-02 VITALS
DIASTOLIC BLOOD PRESSURE: 72 MMHG | HEART RATE: 85 BPM | WEIGHT: 160 LBS | HEIGHT: 69 IN | SYSTOLIC BLOOD PRESSURE: 106 MMHG | BODY MASS INDEX: 23.7 KG/M2

## 2023-02-02 DIAGNOSIS — Z86.73 HISTORY OF STROKE: ICD-10-CM

## 2023-02-02 DIAGNOSIS — I50.42 CHRONIC COMBINED SYSTOLIC AND DIASTOLIC HEART FAILURE: ICD-10-CM

## 2023-02-02 DIAGNOSIS — I63.9 CEREBROVASCULAR ACCIDENT (CVA), UNSPECIFIED MECHANISM: ICD-10-CM

## 2023-02-02 DIAGNOSIS — I10 ESSENTIAL HYPERTENSION: Primary | ICD-10-CM

## 2023-02-02 DIAGNOSIS — R64 CACHEXIA: ICD-10-CM

## 2023-02-02 DIAGNOSIS — H53.8 BLURRY VISION, LEFT EYE: ICD-10-CM

## 2023-02-02 PROCEDURE — 99213 OFFICE O/P EST LOW 20 MIN: CPT | Mod: PBBFAC | Performed by: PSYCHIATRY & NEUROLOGY

## 2023-02-02 PROCEDURE — 99999 PR PBB SHADOW E&M-EST. PATIENT-LVL III: CPT | Mod: PBBFAC,,, | Performed by: PSYCHIATRY & NEUROLOGY

## 2023-02-02 PROCEDURE — 99214 OFFICE O/P EST MOD 30 MIN: CPT | Mod: S$PBB,,, | Performed by: PSYCHIATRY & NEUROLOGY

## 2023-02-02 PROCEDURE — 99214 PR OFFICE/OUTPT VISIT, EST, LEVL IV, 30-39 MIN: ICD-10-PCS | Mod: S$PBB,,, | Performed by: PSYCHIATRY & NEUROLOGY

## 2023-02-02 PROCEDURE — 99999 PR PBB SHADOW E&M-EST. PATIENT-LVL III: ICD-10-PCS | Mod: PBBFAC,,, | Performed by: PSYCHIATRY & NEUROLOGY

## 2023-02-02 NOTE — ASSESSMENT & PLAN NOTE
Unclear re: new stroke in 11/2022 while hospitalized vs. recrudescence from prior infarct within hemisphere. There was an area of DWI+ in left periventricular occipital horn. He has had some left sided, predominantly described as monocular vision changes since December which would not necessarily correlate with this lesion either.    EF 15%, prior 30 day cardiac monitoring in 2022 was negative for atrial fibrillation.    Currently on DAPT w/ brillinta / aspirin per cardiology, brillinta added to regimen after 12/2022 discharge.    Current secondary strong prevention does not require DAPT therapy and this can be converted to monotherapy with either agent at this time.     Aggressive secondary stroke prevention strategy in this patient with EF of 15% could include DOAC monotherapy in favor of DAPT or antiplatelet monotherapy, however given his fall risk and history as well as hx of b/l subdural hemorrhages, and no clear new cardioembolic infarct during last hospitalization, continuing current medical regimen per cardiology (f/u in 3/2023) at this time makes the most sense at this time.    ILR / loop monitoring for long term detection of atrial fibrillation may also help adjust risk/benefit of DOAC initiation vs. Pursuit of WATCHMAN if he would be a candidate.

## 2023-02-02 NOTE — ASSESSMENT & PLAN NOTE
90 lb weight loss over last year which has been unintentional. Denies f/c/ns.  Recent CTA chest and CXR have not uncovered any malignancy.  He does have primary care f/u in 8 days and this should be addressed during that visit.

## 2023-02-02 NOTE — ASSESSMENT & PLAN NOTE
Likely unrelated to incidental left occipital horn area of DWI+.  Has f/u with ophthalmology / optometry already scheduled.

## 2023-02-02 NOTE — PROGRESS NOTES
Vascular Neurology  Clinic Note    ___________________  ASSESSMENT & PLAN    Problem List Items Addressed This Visit          1 - High    History of stroke    Current Assessment & Plan     Unclear re: new stroke in 11/2022 while hospitalized vs. recrudescence from prior infarct within hemisphere. There was an area of DWI+ in left periventricular occipital horn. He has had some left sided, predominantly described as monocular vision changes since December which would not necessarily correlate with this lesion either.    EF 15%, prior 30 day cardiac monitoring in 2022 was negative for atrial fibrillation.    Currently on DAPT w/ brillinta / aspirin per cardiology, brillinta added to regimen after 12/2022 discharge.    Current secondary strong prevention does not require DAPT therapy and this can be converted to monotherapy with either agent at this time.     Aggressive secondary stroke prevention strategy in this patient with EF of 15% could include DOAC monotherapy in favor of DAPT or antiplatelet monotherapy, however given his fall risk and history as well as hx of b/l subdural hemorrhages, and no clear new cardioembolic infarct during last hospitalization, continuing current medical regimen per cardiology (f/u in 3/2023) at this time makes the most sense at this time.    ILR / loop monitoring for long term detection of atrial fibrillation may also help adjust risk/benefit of DOAC initiation vs. Pursuit of WATCHMAN if he would be a candidate.            Unprioritized    Essential hypertension - Primary    Stroke    Overview     September 2021         Chronic combined systolic and diastolic heart failure    Blurry vision, left eye    Current Assessment & Plan     Likely unrelated to incidental left occipital horn area of DWI+.  Has f/u with ophthalmology / optometry already scheduled.         Cachexia    Current Assessment & Plan     90 lb weight loss over last year which has been unintentional. Denies  f/c/ns.  Recent CTA chest and CXR have not uncovered any malignancy.  He does have primary care f/u in 8 days and this should be addressed during that visit.            Reason For Visit (Chief Complaint): stroke code while inpatient 11/2022    HPI: 82 y.o. right handed male with admission 11/2022 for NSTEMI, found to have R facial droop and slurred speech, MRI showed unclear findings of possible subacute infarct on setting of extensive white matter chronic microvascular disease and remote infarcts.    Patient last seen in resident clinic on 10/28/21 for recent L FADY infarct at that time w/ R LE weakness. Etiology felt to be hypoperfusion/cardiac in nature given his CHF.      Brain Imaging:  MRI 11/27 MRI without contrast demonstrates extensive white matter signal changes consistent with microvascular disease.  There are remote cortical infarcts in the frontal lobes bilaterally, larger on the left side where it extends posteriorly into the anterior parasagittal parietal lobe.     Cortical methemoglobin staining is present in the left temporal lobe cortex and left posterior frontal precentral cortex with T2 shine through on diffusion, and these areas may represent more recent subacute infarcts.  No acute hemorrhage is seen.  Vessel Imaging:  CTA 9/7/2021  1. Postcontrast head CT demonstrates increased attenuation, favored to represent cortical enhancement rather than hemorrhage, involving the left paramedian frontal and parietal lobes and likely portions of the cingulate gyrus and corpus callosum.  This would be compatible with evolving late subacute infarct in the FADY territory.  Clinical correlation recommended.  However, short-term repeat noncontrast head CT would be recommended to exclude the possibility of acute hemorrhage, not entirely excluded given the lack of precontrast imaging on the current study.  Further, pre and postcontrast MRI of the brain could be performed to confirm suspected evolving ischemic  findings exclude the possibility of a neoplastic lesion or infectious/inflammatory process.  2. CTA without evidence of acute large vessel occlusion or flow-limiting stenosis.  3. Note is made of tortuosity of bilateral mid cervical internal carotid artery segments with focally short segment increased caliber which could relate to tortuosity, however the possibility of pseudoaneurysm is not excluded.  Attention on follow-up would be recommended.  4. Small bilateral convexity subdural collections seen on prior MRI imaging of 09/07/2021 not well characterized by current technique.    CTA 11/30/22  Mild blood products are associated with a recent small left occipital deep white matter infarct much of which may be petechial in nature however continued follow-up is recommended (the mild blood products are new from the CT study dated 11/26/2022).  No mass effect or new territorial infarct.     No significant stenosis at the carotid bifurcations by NASCET criteria.  The vertebral arteries are patent without advanced stenosis.     Intracranially there is moderate to severe stenosis of the right cavernous/supraclinoid segment (similar to 2021) with mild narrowing on the left.  No major branch stenosis/occlusion is identified at the [id number].     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    Cardiac Evaluation:  Summary    The left ventricle is mildly enlarged with concentric hypertrophy and severely decreased systolic function.  Severe left atrial enlargement.  Grade III left ventricular diastolic dysfunction.  There is no evidence of intracardiac shunting.  Mild right ventricular enlargement with mildly reduced right ventricular systolic function.  Right atrial enlargement.  Mild-to-moderate mitral regurgitation.  Mild tricuspid regurgitation.  There is moderate pulmonary hypertension.  Other:     Relevant Labwork:  Recent Labs   Lab 11/30/22  0415   Hemoglobin A1C 5.1   LDL Cholesterol  48.4 L   HDL 41   Triglycerides 43   Cholesterol 98 L       I independently viewed the above imaging studies in addition to reviewing the report.  I reviewed the above labwork.    Review of Systems  Msk: negative for muscle pain  Skin: negative for pruritis  Neuro: negative for headache  All others negative    Past Medical History  Past Medical History:   Diagnosis Date    Hypertension     Stroke      Family History  No relevant history   Social History  Social History     Socioeconomic History    Marital status:    Tobacco Use    Smoking status: Never    Smokeless tobacco: Never   Substance and Sexual Activity    Alcohol use: Not Currently     Comment: no alcohol for 6 months    Drug use: Never    Sexual activity: Not Currently     Social Determinants of Health     Financial Resource Strain: Low Risk     Difficulty of Paying Living Expenses: Not hard at all   Food Insecurity: No Food Insecurity    Worried About Running Out of Food in the Last Year: Never true    Ran Out of Food in the Last Year: Never true   Transportation Needs: No Transportation Needs    Lack of Transportation (Medical): No    Lack of Transportation (Non-Medical): No   Physical Activity: Inactive    Days of Exercise per Week: 0 days    Minutes of Exercise per Session: 0 min   Stress: Stress Concern Present    Feeling of Stress : Rather much   Social Connections: Moderately Integrated    Frequency of Communication with Friends and Family: More than three times a week    Frequency of Social Gatherings with Friends and Family: Three times a week    Attends Congregational Services: More than 4 times per year    Active Member of Clubs or Organizations: No    Attends Club or Organization Meetings: Never    Marital Status:    Housing Stability: Low Risk     Unable to Pay for Housing in the Last Year: No    Number of Places Lived in the Last Year: 1    Unstable Housing in the Last Year: No        Medication List with Changes/Refills   Current  Medications    ACETAMINOPHEN (TYLENOL) 325 MG TABLET    Take 650 mg by mouth every 8 (eight) hours as needed.    ASPIRIN (ECOTRIN) 81 MG EC TABLET    Take 1 tablet (81 mg total) by mouth once daily.    ATORVASTATIN (LIPITOR) 80 MG TABLET    Take 1 tablet (80 mg total) by mouth every evening.    CARVEDILOL (COREG) 3.125 MG TABLET    Take 1 tablet (3.125 mg total) by mouth 2 (two) times daily.    CYANOCOBALAMIN (VITAMIN B-12) 1000 MCG TABLET    Take 1 tablet (1,000 mcg total) by mouth once daily.    DIGOXIN (LANOXIN) 125 MCG TABLET    Take 1 tablet (0.125 mg total) by mouth once daily.    FUROSEMIDE (LASIX) 40 MG TABLET    Take 1 tablet (40 mg total) by mouth once daily.    MELATONIN (MELATIN) 3 MG TABLET    Take 2 tablets (6 mg total) by mouth nightly.    NITROGLYCERIN (NITROSTAT) 0.4 MG SL TABLET    Place 1 tablet (0.4 mg total) under the tongue every 5 (five) minutes as needed for Chest pain.  If chest pain is not relieved or worsens 3 to 5 minutes after 1 dose, call 911 and seek immediate emergency medical attention.    SACUBITRIL-VALSARTAN (ENTRESTO) 24-26 MG PER TABLET    Take 1 tablet by mouth 2 (two) times daily.    TICAGRELOR (BRILINTA) 90 MG TABLET    Take 1 tablet (90 mg total) by mouth 2 (two) times daily.       EXAM  Vital Signs:  Vitals - 1 value per visit 12/22/2022 12/25/2022 12/25/2022 12/25/2022 12/25/2022 2/2/2023 2/2/2023   SYSTOLIC 100 105 112 125 123 - 106   DIASTOLIC 70 68 71 76 78 - 72   Pulse 68 103 100 108 103 - 85   Temp - 99.5 - - - - -   Resp - 18 - 18 - - -   SPO2 96 97 100 98 99 - -   Weight (lb) 132 117 - - - - 160   Weight (kg) 59.875 53.071 - - - - 72.576   Height 64 69 - - - - 69   BMI (Calculated) 22.6 17.3 - - - - 23.6   VISIT REPORT - - - - - - -   Pain Score  - - - - - 0 -       General: cachectic, without discomfort   Mental Status:alert, oriented to person - place - age - month   Language: able to name, repeat, comprehend commands   Cranial Nerves: EOMI, left eye w/ reduced  vision but able to count fingers; no facial asymmetry, tongue to midline, palate midline  Motor: 4+5 power in L LE  Sensory: intact light touch bilaterally,  Gait & Stance: wheelchair    Stroke Scales      MD Britton  Vascular Neurology  Office [phone]  Fax [phone]

## 2023-02-08 ENCOUNTER — DOCUMENT SCAN (OUTPATIENT)
Dept: HOME HEALTH SERVICES | Facility: HOSPITAL | Age: 83
End: 2023-02-08
Payer: MEDICARE

## 2023-03-06 PROBLEM — I21.4 NSTEMI (NON-ST ELEVATED MYOCARDIAL INFARCTION): Status: RESOLVED | Noted: 2022-11-30 | Resolved: 2023-03-06

## 2023-04-20 ENCOUNTER — HOSPITAL ENCOUNTER (EMERGENCY)
Facility: OTHER | Age: 83
Discharge: HOME | End: 2023-04-20
Attending: EMERGENCY MEDICINE
Payer: MEDICARE

## 2023-04-20 VITALS
TEMPERATURE: 98 F | OXYGEN SATURATION: 98 % | HEART RATE: 69 BPM | RESPIRATION RATE: 20 BRPM | SYSTOLIC BLOOD PRESSURE: 130 MMHG | DIASTOLIC BLOOD PRESSURE: 61 MMHG

## 2023-04-20 DIAGNOSIS — R31.9 HEMATURIA, UNSPECIFIED TYPE: Primary | ICD-10-CM

## 2023-04-20 LAB
BACTERIA #/AREA URNS HPF: ABNORMAL /HPF
BILIRUB UR QL STRIP: NEGATIVE
CLARITY UR: ABNORMAL
COLOR UR: ABNORMAL
GLUCOSE UR QL STRIP: NEGATIVE
HGB UR QL STRIP: ABNORMAL
HYALINE CASTS #/AREA URNS LPF: 0 /LPF
KETONES UR QL STRIP: NEGATIVE
LEUKOCYTE ESTERASE UR QL STRIP: ABNORMAL
MICROSCOPIC COMMENT: ABNORMAL
NITRITE UR QL STRIP: NEGATIVE
PH UR STRIP: 6 [PH] (ref 5–8)
PROT UR QL STRIP: ABNORMAL
RBC #/AREA URNS HPF: >100 /HPF (ref 0–4)
SP GR UR STRIP: <1.005 (ref 1–1.03)
URN SPEC COLLECT METH UR: ABNORMAL
UROBILINOGEN UR STRIP-ACNC: NEGATIVE EU/DL
WBC #/AREA URNS HPF: 61 /HPF (ref 0–5)

## 2023-04-20 PROCEDURE — 63600175 PHARM REV CODE 636 W HCPCS: Performed by: EMERGENCY MEDICINE

## 2023-04-20 PROCEDURE — 87086 URINE CULTURE/COLONY COUNT: CPT | Performed by: EMERGENCY MEDICINE

## 2023-04-20 PROCEDURE — 96372 THER/PROPH/DIAG INJ SC/IM: CPT | Performed by: EMERGENCY MEDICINE

## 2023-04-20 PROCEDURE — 81000 URINALYSIS NONAUTO W/SCOPE: CPT | Performed by: EMERGENCY MEDICINE

## 2023-04-20 PROCEDURE — 99284 EMERGENCY DEPT VISIT MOD MDM: CPT

## 2023-04-20 RX ORDER — CEFTRIAXONE 1 G/1
1 INJECTION, POWDER, FOR SOLUTION INTRAMUSCULAR; INTRAVENOUS
Status: COMPLETED | OUTPATIENT
Start: 2023-04-20 | End: 2023-04-20

## 2023-04-20 RX ORDER — CEPHALEXIN 500 MG/1
500 CAPSULE ORAL 4 TIMES DAILY
Qty: 20 CAPSULE | Refills: 0 | Status: SHIPPED | OUTPATIENT
Start: 2023-04-20 | End: 2023-04-25

## 2023-04-20 RX ADMIN — CEFTRIAXONE 1 G: 1 INJECTION, POWDER, FOR SOLUTION INTRAMUSCULAR; INTRAVENOUS at 08:04

## 2023-04-21 NOTE — ED PROVIDER NOTES
Encounter Date: 4/20/2023       History     Chief Complaint   Patient presents with    Hematuria     Blood in urine x2days     HPI    83-year-old male with past medical history of hypertension and stroke presents with hematuria for 2 days.  Patient reports initially noticing a little burning when he urinated around 2 days ago states that he had some bright red blood in his urine.  He reports it has continued and has lightened up a little bit earlier today but he continues to have some burning when he urinates.  He reports no testicular pain, denies any abdominal pain, feels like he empties his bladder High, denies any blood clots that he is noted.  He does report taking Brilinta and denies any fevers/chill, weakness, nausea/vomiting, or any further complaints at this time.    Review of patient's allergies indicates:  No Known Allergies  Past Medical History:   Diagnosis Date    Hypertension     Stroke      No past surgical history on file.  Family History   Problem Relation Age of Onset    Hypertension Mother     Alcohol abuse Father     No Known Problems Sister     No Known Problems Daughter     No Known Problems Son     Hypertension Paternal Aunt     Hypertension Paternal Grandmother     Hypertension Paternal Grandfather      Social History     Tobacco Use    Smoking status: Never    Smokeless tobacco: Never   Substance Use Topics    Alcohol use: Not Currently     Comment: no alcohol for 6 months    Drug use: Never     Review of Systems   Constitutional: Negative.    HENT: Negative.     Eyes: Negative.    Respiratory: Negative.     Cardiovascular: Negative.    Gastrointestinal: Negative.    Genitourinary:  Positive for dysuria and hematuria.   Musculoskeletal: Negative.    Skin: Negative.    Neurological: Negative.      Physical Exam     Initial Vitals [04/20/23 1920]   BP Pulse Resp Temp SpO2   114/83 87 16 98 °F (36.7 °C) 98 %      MAP       --         Physical Exam    Nursing note and vitals  reviewed.  Constitutional: He appears well-developed and well-nourished. He is not diaphoretic. No distress.   HENT:   Head: Normocephalic and atraumatic.   Eyes: Pupils are equal, round, and reactive to light. Right eye exhibits no discharge. Left eye exhibits no discharge.   Neck: No tracheal deviation present.   Normal range of motion.  Cardiovascular:  Normal rate and regular rhythm.           Pulmonary/Chest: No stridor. No respiratory distress.   Abdominal: Abdomen is soft. Bowel sounds are normal. He exhibits no distension. There is no abdominal tenderness. There is no rebound and no guarding.   Genitourinary:    Genitourinary Comments: Unremarkable  exam, no discharge drainage noted, no testicular pain noted or abnormal lie present, urethra appears unremarkable.     Musculoskeletal:         General: No tenderness or edema. Normal range of motion.      Cervical back: Normal range of motion.     Neurological: He is alert and oriented to person, place, and time.   Skin: Skin is warm and dry.       ED Course   Procedures  Labs Reviewed   URINALYSIS, REFLEX TO URINE CULTURE - Abnormal; Notable for the following components:       Result Value    Color, UA Brown (*)     Appearance, UA Hazy (*)     Specific Gravity, UA <1.005 (*)     Protein, UA 1+ (*)     Occult Blood UA 3+ (*)     Leukocytes, UA Trace (*)     All other components within normal limits    Narrative:     Specimen Source->Urine   URINALYSIS MICROSCOPIC - Abnormal; Notable for the following components:    RBC, UA >100 (*)     WBC, UA 61 (*)     All other components within normal limits    Narrative:     Specimen Source->Urine   CULTURE, URINE          Imaging Results    None          Medications   cefTRIAXone injection 1 g (1 g Intramuscular Given 4/20/23 2037)                      MDM:    83-year-old male with past medical history of hypertension and stroke presents with hematuria for 2 days.  Physical exam as noted above.  ED workup notable for  urinalysis with trace leukocytes, 61 white blood cell, occasional bacteria.  Patient presentation consistent with hemorrhagic cystitis in otherwise unremarkable appearance on concerning for systemic infection, sepsis, urinary retention, coagulopathy, significant anemia or any further surgical or medical emergency.  Discussed with patient family bedside regarding plan further care, Rocephin given here with continued outpatient treatment with Keflex with no prior urine cultures to help guide therapy at this time.  Discussed diagnosis and further treatment with patient and family at bedside, including f/u.  Return precautions given and all questions answered.  Patient and family in understanding of plan.  Pt discharged to home improved and stable.              Clinical Impression:   Final diagnoses:  [R31.9] Hematuria, unspecified type (Primary)        ED Disposition Condition    Discharge Stable          ED Prescriptions       Medication Sig Dispense Start Date End Date Auth. Provider    cephALEXin (KEFLEX) 500 MG capsule Take 1 capsule (500 mg total) by mouth 4 (four) times daily. for 5 days 20 capsule 4/20/2023 4/25/2023 Duane Lau MD          Follow-up Information       Follow up With Specialties Details Why Contact Info    [facility] - Emergency Dept Emergency Medicine Go to  If symptoms worsen [address] [id number] [phone]    Renaldo Molina MD Internal Medicine Go in 1 week As needed [address] [facility]  [address]LO[address]  [facility] [address]  [phone]               Duane Lau MD  04/21/23 2492

## 2023-04-21 NOTE — ED TRIAGE NOTES
Gregorio JOSÉ MIGUEL Bishop, an 83 y.o. male presents to the ED c.o. hematuria x2 days and mild pain with urination. Pt also, reports SOB. O2 98 on room air, respirations unlabored and even. Hx stroke and HTN      Chief Complaint   Patient presents with    Hematuria     Blood in urine x2days     Review of patient's allergies indicates:  No Known Allergies  Past Medical History:   Diagnosis Date    Hypertension     Stroke

## 2023-04-22 ENCOUNTER — HOSPITAL ENCOUNTER (OUTPATIENT)
Facility: OTHER | Age: 83
Discharge: HOSPICE | End: 2023-04-23
Attending: EMERGENCY MEDICINE | Admitting: EMERGENCY MEDICINE
Payer: MEDICARE

## 2023-04-22 DIAGNOSIS — R31.0 GROSS HEMATURIA: Primary | ICD-10-CM

## 2023-04-22 LAB
ANION GAP SERPL CALC-SCNC: 11 MMOL/L (ref 8–16)
BACTERIA #/AREA URNS HPF: ABNORMAL /HPF
BACTERIA UR CULT: NO GROWTH
BASOPHILS # BLD AUTO: 0.05 K/UL (ref 0–0.2)
BASOPHILS # BLD AUTO: 0.06 K/UL (ref 0–0.2)
BASOPHILS NFR BLD: 0.6 % (ref 0–1.9)
BASOPHILS NFR BLD: 0.7 % (ref 0–1.9)
BILIRUB UR QL STRIP: NEGATIVE
BUN SERPL-MCNC: 20 MG/DL (ref 8–23)
CALCIUM SERPL-MCNC: 8.8 MG/DL (ref 8.7–10.5)
CHLORIDE SERPL-SCNC: 103 MMOL/L (ref 95–110)
CLARITY UR: ABNORMAL
CO2 SERPL-SCNC: 30 MMOL/L (ref 23–29)
COLOR UR: ABNORMAL
CREAT SERPL-MCNC: 0.9 MG/DL (ref 0.5–1.4)
DIFFERENTIAL METHOD: ABNORMAL
DIFFERENTIAL METHOD: ABNORMAL
EOSINOPHIL # BLD AUTO: 0.1 K/UL (ref 0–0.5)
EOSINOPHIL # BLD AUTO: 0.2 K/UL (ref 0–0.5)
EOSINOPHIL NFR BLD: 1 % (ref 0–8)
EOSINOPHIL NFR BLD: 1.9 % (ref 0–8)
ERYTHROCYTE [DISTWIDTH] IN BLOOD BY AUTOMATED COUNT: 13.2 % (ref 11.5–14.5)
ERYTHROCYTE [DISTWIDTH] IN BLOOD BY AUTOMATED COUNT: 13.2 % (ref 11.5–14.5)
EST. GFR  (NO RACE VARIABLE): >60 ML/MIN/1.73 M^2
GLUCOSE SERPL-MCNC: 138 MG/DL (ref 70–110)
GLUCOSE UR QL STRIP: NEGATIVE
HCT VFR BLD AUTO: 34.3 % (ref 40–54)
HCT VFR BLD AUTO: 35.5 % (ref 40–54)
HGB BLD-MCNC: 11 G/DL (ref 14–18)
HGB BLD-MCNC: 11.3 G/DL (ref 14–18)
HGB UR QL STRIP: ABNORMAL
HYALINE CASTS #/AREA URNS LPF: 0 /LPF
IMM GRANULOCYTES # BLD AUTO: 0.01 K/UL (ref 0–0.04)
IMM GRANULOCYTES # BLD AUTO: 0.02 K/UL (ref 0–0.04)
IMM GRANULOCYTES NFR BLD AUTO: 0.1 % (ref 0–0.5)
IMM GRANULOCYTES NFR BLD AUTO: 0.3 % (ref 0–0.5)
KETONES UR QL STRIP: NEGATIVE
LEUKOCYTE ESTERASE UR QL STRIP: NEGATIVE
LYMPHOCYTES # BLD AUTO: 1.5 K/UL (ref 1–4.8)
LYMPHOCYTES # BLD AUTO: 1.8 K/UL (ref 1–4.8)
LYMPHOCYTES NFR BLD: 18.9 % (ref 18–48)
LYMPHOCYTES NFR BLD: 19.8 % (ref 18–48)
MCH RBC QN AUTO: 31.1 PG (ref 27–31)
MCH RBC QN AUTO: 31.2 PG (ref 27–31)
MCHC RBC AUTO-ENTMCNC: 31.8 G/DL (ref 32–36)
MCHC RBC AUTO-ENTMCNC: 32.1 G/DL (ref 32–36)
MCV RBC AUTO: 97 FL (ref 82–98)
MCV RBC AUTO: 98 FL (ref 82–98)
MICROSCOPIC COMMENT: ABNORMAL
MONOCYTES # BLD AUTO: 0.7 K/UL (ref 0.3–1)
MONOCYTES # BLD AUTO: 0.8 K/UL (ref 0.3–1)
MONOCYTES NFR BLD: 8.8 % (ref 4–15)
MONOCYTES NFR BLD: 8.9 % (ref 4–15)
NEUTROPHILS # BLD AUTO: 5.4 K/UL (ref 1.8–7.7)
NEUTROPHILS # BLD AUTO: 6.4 K/UL (ref 1.8–7.7)
NEUTROPHILS NFR BLD: 69.4 % (ref 38–73)
NEUTROPHILS NFR BLD: 69.6 % (ref 38–73)
NITRITE UR QL STRIP: NEGATIVE
NRBC BLD-RTO: 0 /100 WBC
NRBC BLD-RTO: 0 /100 WBC
PH UR STRIP: 7 [PH] (ref 5–8)
PLATELET # BLD AUTO: 239 K/UL (ref 150–450)
PLATELET # BLD AUTO: 247 K/UL (ref 150–450)
PMV BLD AUTO: 9.9 FL (ref 9.2–12.9)
PMV BLD AUTO: 9.9 FL (ref 9.2–12.9)
POTASSIUM SERPL-SCNC: 3.9 MMOL/L (ref 3.5–5.1)
PROT UR QL STRIP: ABNORMAL
RBC # BLD AUTO: 3.53 M/UL (ref 4.6–6.2)
RBC # BLD AUTO: 3.63 M/UL (ref 4.6–6.2)
RBC #/AREA URNS HPF: >100 /HPF (ref 0–4)
SODIUM SERPL-SCNC: 144 MMOL/L (ref 136–145)
SP GR UR STRIP: 1.02 (ref 1–1.03)
URN SPEC COLLECT METH UR: ABNORMAL
UROBILINOGEN UR STRIP-ACNC: NEGATIVE EU/DL
WBC # BLD AUTO: 7.76 K/UL (ref 3.9–12.7)
WBC # BLD AUTO: 9.23 K/UL (ref 3.9–12.7)
WBC #/AREA URNS HPF: 0 /HPF (ref 0–5)

## 2023-04-22 PROCEDURE — 85025 COMPLETE CBC W/AUTO DIFF WBC: CPT | Performed by: PHYSICIAN ASSISTANT

## 2023-04-22 PROCEDURE — G0378 HOSPITAL OBSERVATION PER HR: HCPCS

## 2023-04-22 PROCEDURE — 51798 US URINE CAPACITY MEASURE: CPT

## 2023-04-22 PROCEDURE — 99285 EMERGENCY DEPT VISIT HI MDM: CPT | Mod: 25

## 2023-04-22 PROCEDURE — 51702 INSERT TEMP BLADDER CATH: CPT

## 2023-04-22 PROCEDURE — 80048 BASIC METABOLIC PNL TOTAL CA: CPT | Performed by: PHYSICIAN ASSISTANT

## 2023-04-22 PROCEDURE — 81000 URINALYSIS NONAUTO W/SCOPE: CPT | Performed by: PHYSICIAN ASSISTANT

## 2023-04-22 RX ORDER — DIGOXIN 125 MCG
0.12 TABLET ORAL DAILY
Status: DISCONTINUED | OUTPATIENT
Start: 2023-04-23 | End: 2023-04-23 | Stop reason: HOSPADM

## 2023-04-22 RX ORDER — ATORVASTATIN CALCIUM 20 MG/1
80 TABLET, FILM COATED ORAL NIGHTLY
Status: DISCONTINUED | OUTPATIENT
Start: 2023-04-22 | End: 2023-04-23 | Stop reason: HOSPADM

## 2023-04-22 RX ORDER — ONDANSETRON 2 MG/ML
4 INJECTION INTRAMUSCULAR; INTRAVENOUS EVERY 8 HOURS PRN
Status: DISCONTINUED | OUTPATIENT
Start: 2023-04-22 | End: 2023-04-23 | Stop reason: HOSPADM

## 2023-04-22 RX ORDER — CARVEDILOL 3.12 MG/1
3.12 TABLET ORAL 2 TIMES DAILY
Status: DISCONTINUED | OUTPATIENT
Start: 2023-04-23 | End: 2023-04-23 | Stop reason: HOSPADM

## 2023-04-22 RX ORDER — TALC
6 POWDER (GRAM) TOPICAL NIGHTLY PRN
Status: DISCONTINUED | OUTPATIENT
Start: 2023-04-22 | End: 2023-04-23 | Stop reason: HOSPADM

## 2023-04-22 RX ORDER — ACETAMINOPHEN 325 MG/1
650 TABLET ORAL EVERY 8 HOURS PRN
Status: DISCONTINUED | OUTPATIENT
Start: 2023-04-22 | End: 2023-04-23 | Stop reason: HOSPADM

## 2023-04-22 RX ORDER — MORPHINE SULFATE 2 MG/ML
2 INJECTION, SOLUTION INTRAMUSCULAR; INTRAVENOUS EVERY 4 HOURS PRN
Status: DISCONTINUED | OUTPATIENT
Start: 2023-04-22 | End: 2023-04-23 | Stop reason: HOSPADM

## 2023-04-22 NOTE — Clinical Note
Diagnosis: Gross hematuria [599.71.ICD-9-CM]   Future Attending Provider: BARRIE HUTCHINS [[id number]]   Is the patient being sent to ED Observation?: Yes   Admitting Provider:: BARRIE HUTCHINS [[id number]]   Special Needs:: No Special Needs [1]

## 2023-04-23 VITALS
HEIGHT: 67 IN | RESPIRATION RATE: 18 BRPM | BODY MASS INDEX: 25.11 KG/M2 | OXYGEN SATURATION: 99 % | TEMPERATURE: 98 F | DIASTOLIC BLOOD PRESSURE: 74 MMHG | WEIGHT: 160 LBS | HEART RATE: 69 BPM | SYSTOLIC BLOOD PRESSURE: 113 MMHG

## 2023-04-23 PROBLEM — R31.0 GROSS HEMATURIA: Status: ACTIVE | Noted: 2023-04-23

## 2023-04-23 LAB
ANION GAP SERPL CALC-SCNC: 10 MMOL/L (ref 8–16)
BASOPHILS # BLD AUTO: 0.04 K/UL (ref 0–0.2)
BASOPHILS # BLD AUTO: 0.07 K/UL (ref 0–0.2)
BASOPHILS NFR BLD: 0.5 % (ref 0–1.9)
BASOPHILS NFR BLD: 0.8 % (ref 0–1.9)
BUN SERPL-MCNC: 16 MG/DL (ref 8–23)
CALCIUM SERPL-MCNC: 8.8 MG/DL (ref 8.7–10.5)
CHLORIDE SERPL-SCNC: 103 MMOL/L (ref 95–110)
CO2 SERPL-SCNC: 30 MMOL/L (ref 23–29)
CREAT SERPL-MCNC: 0.8 MG/DL (ref 0.5–1.4)
DIFFERENTIAL METHOD: ABNORMAL
DIFFERENTIAL METHOD: ABNORMAL
EOSINOPHIL # BLD AUTO: 0.1 K/UL (ref 0–0.5)
EOSINOPHIL # BLD AUTO: 0.1 K/UL (ref 0–0.5)
EOSINOPHIL NFR BLD: 1.7 % (ref 0–8)
EOSINOPHIL NFR BLD: 1.8 % (ref 0–8)
ERYTHROCYTE [DISTWIDTH] IN BLOOD BY AUTOMATED COUNT: 13 % (ref 11.5–14.5)
ERYTHROCYTE [DISTWIDTH] IN BLOOD BY AUTOMATED COUNT: 13.2 % (ref 11.5–14.5)
EST. GFR  (NO RACE VARIABLE): >60 ML/MIN/1.73 M^2
GLUCOSE SERPL-MCNC: 94 MG/DL (ref 70–110)
HCT VFR BLD AUTO: 33.1 % (ref 40–54)
HCT VFR BLD AUTO: 34.7 % (ref 40–54)
HGB BLD-MCNC: 10.6 G/DL (ref 14–18)
HGB BLD-MCNC: 11.2 G/DL (ref 14–18)
IMM GRANULOCYTES # BLD AUTO: 0.01 K/UL (ref 0–0.04)
IMM GRANULOCYTES # BLD AUTO: 0.02 K/UL (ref 0–0.04)
IMM GRANULOCYTES NFR BLD AUTO: 0.1 % (ref 0–0.5)
IMM GRANULOCYTES NFR BLD AUTO: 0.2 % (ref 0–0.5)
LYMPHOCYTES # BLD AUTO: 1.5 K/UL (ref 1–4.8)
LYMPHOCYTES # BLD AUTO: 1.9 K/UL (ref 1–4.8)
LYMPHOCYTES NFR BLD: 19.3 % (ref 18–48)
LYMPHOCYTES NFR BLD: 21.9 % (ref 18–48)
MCH RBC QN AUTO: 31 PG (ref 27–31)
MCH RBC QN AUTO: 31.2 PG (ref 27–31)
MCHC RBC AUTO-ENTMCNC: 32 G/DL (ref 32–36)
MCHC RBC AUTO-ENTMCNC: 32.3 G/DL (ref 32–36)
MCV RBC AUTO: 96 FL (ref 82–98)
MCV RBC AUTO: 97 FL (ref 82–98)
MONOCYTES # BLD AUTO: 1 K/UL (ref 0.3–1)
MONOCYTES # BLD AUTO: 1 K/UL (ref 0.3–1)
MONOCYTES NFR BLD: 12.1 % (ref 4–15)
MONOCYTES NFR BLD: 12.2 % (ref 4–15)
NEUTROPHILS # BLD AUTO: 5.2 K/UL (ref 1.8–7.7)
NEUTROPHILS # BLD AUTO: 5.4 K/UL (ref 1.8–7.7)
NEUTROPHILS NFR BLD: 63.2 % (ref 38–73)
NEUTROPHILS NFR BLD: 66.2 % (ref 38–73)
NRBC BLD-RTO: 0 /100 WBC
NRBC BLD-RTO: 0 /100 WBC
PLATELET # BLD AUTO: 242 K/UL (ref 150–450)
PLATELET # BLD AUTO: 248 K/UL (ref 150–450)
PMV BLD AUTO: 9.6 FL (ref 9.2–12.9)
PMV BLD AUTO: 9.8 FL (ref 9.2–12.9)
POTASSIUM SERPL-SCNC: 3.8 MMOL/L (ref 3.5–5.1)
RBC # BLD AUTO: 3.4 M/UL (ref 4.6–6.2)
RBC # BLD AUTO: 3.61 M/UL (ref 4.6–6.2)
SODIUM SERPL-SCNC: 143 MMOL/L (ref 136–145)
WBC # BLD AUTO: 7.91 K/UL (ref 3.9–12.7)
WBC # BLD AUTO: 8.46 K/UL (ref 3.9–12.7)

## 2023-04-23 PROCEDURE — 85025 COMPLETE CBC W/AUTO DIFF WBC: CPT | Performed by: PHYSICIAN ASSISTANT

## 2023-04-23 PROCEDURE — G0378 HOSPITAL OBSERVATION PER HR: HCPCS

## 2023-04-23 PROCEDURE — 36415 COLL VENOUS BLD VENIPUNCTURE: CPT | Performed by: PHYSICIAN ASSISTANT

## 2023-04-23 PROCEDURE — 80048 BASIC METABOLIC PNL TOTAL CA: CPT | Performed by: PHYSICIAN ASSISTANT

## 2023-04-23 PROCEDURE — 25000003 PHARM REV CODE 250: Performed by: PHYSICIAN ASSISTANT

## 2023-04-23 PROCEDURE — 99284 PR EMERGENCY DEPT VISIT,LEVEL IV: ICD-10-PCS | Mod: GW,,, | Performed by: UROLOGY

## 2023-04-23 PROCEDURE — 99284 EMERGENCY DEPT VISIT MOD MDM: CPT | Mod: GW,,, | Performed by: UROLOGY

## 2023-04-23 RX ADMIN — DIGOXIN 0.12 MG: 125 TABLET ORAL at 10:04

## 2023-04-23 RX ADMIN — CARVEDILOL 3.12 MG: 3.12 TABLET, FILM COATED ORAL at 09:04

## 2023-04-23 RX ADMIN — SACUBITRIL AND VALSARTAN 1 TABLET: 24; 26 TABLET, FILM COATED ORAL at 10:04

## 2023-04-23 NOTE — DISCHARGE INSTRUCTIONS
- HAVE HOSPICE NURSE REMOVE COOK CATHETER ON WEDNESDAY AND ATTEMPT VOIDING TRIAL.  -IF CATHETER CLOTS, HAVE HOSPICE NURSE flush with 120 cc saline  -RETURN TO ER FOR WORSENING PAIN, FEVER  -IF HOSPICE NURSE UNABLE TO CARE FOR CATHETER, CALL clinic below   - DO NOT TAKE BRILINTA OR ASPIRIN UNTIL BLOODY URINE RESOLVED.  CALL YOUR PCP TO LET THEM KNOW THAT YOU WILL BE HOLDING THIS MEDICINE.

## 2023-04-23 NOTE — ED NOTES
Patient reports improvement s/p goldstein placement. Family at bedside. Denies any needs at this time. Call light within reach. Vss . Will continue to monitor.

## 2023-04-23 NOTE — ED PROVIDER NOTES
"     Source of History:  Patient     Chief complaint:  Urinary Retention (Pt c/o not voiding x1day when voiding yesterday pt noticed blood in urine.  Pt seen in ED Thursday for s/s and started on Furosamide )      HPI:  Gregorio Bishop is a 83 y.o. male with hypertension, CHF (EF 15%), and history of CVA and NSTEMI (not a candidate for revascularization or ICD), currently in hospice care who is presenting to the emergency department by EMS for urinary retention.  On 04/18, patient developed hematuria with dysuria.  He was seen here in the ER on 04/20 and was diagnosed with a UTI and given Rocephin in the ED and sent home with Keflex.  He states hematuria has not resolved.  He reports urinating normally until a few hours ago.  States he developed significant abdominal pain with inability urinate earlier this afternoon.  He was given fentanyl on route.  He denies fever or vomiting.   This is the extent to the patients complaints today here in the emergency department.    ROS: As per HPI     Review of patient's allergies indicates:  No Known Allergies    PMH:  As per HPI and below:  Past Medical History:   Diagnosis Date    Hypertension     Stroke      History reviewed. No pertinent surgical history.    Social History     Tobacco Use    Smoking status: Never    Smokeless tobacco: Never   Substance Use Topics    Alcohol use: Not Currently     Comment: no alcohol for 6 months    Drug use: Never       Physical Exam:    /89   Pulse 76   Temp 97.9 °F (36.6 °C) (Oral)   Resp 18   Ht 5' 7" (1.702 m)   Wt 72.6 kg (160 lb)   SpO2 97%   BMI 25.06 kg/m²   Nursing note and vital signs reviewed.  Appearance: No acute distress.  Nontoxic appearing  Eyes: No conjunctival injection.  ENT: Oropharynx clear.    Chest/ Respiratory: Clear to auscultation bilaterally.  Good air movement.  No wheezes.  No rhonchi. No rales. No accessory muscle use.  Cardiovascular: Regular rate and rhythm.  No murmurs. No gallops. No " "rubs.  Abdomen: Soft.  Not distended.  Nontender. High catheter in place upon my exam with significant gross hematuria, flowing well.  No clots  Musculoskeletal: Good range of motion all joints.  No deformities.  Neck supple.  No meningismus.  Skin: No rashes seen.  Good turgor.  No abrasions.  No ecchymoses.  Neurologic: Motor intact.  Sensation intact.    Mental Status:  Alert and oriented x 3.  Appropriate, conversant.   Labs that have been ordered have been independently reviewed and interpreted by myself.    I decided to obtain the patient's medical records.  Urine culture from 04/20/2023 without growth.    Social:  [facility] of [facility].  Seems that family and patient has good understanding of hospice.  However, when approached subject regarding code status there seems to be some confusion on this.  Patient was previously under DNR status at last hospitalization.  He states that he would like "any measures necessary" to be done.  When asked specifically about chest compressions and assisted ventilation, he stated I do not want to tube down my throat but if it is necessary all take it".  I tried to approach this subject with patient's spouse and son and they were not amenable to discussion at this time.    MDM/ Differential Dx:    83 y.o. male with hypertension, CHF (EF 15%), and history of CVA, currently in hospice care who presents emergency department today by EMS with persistent hematuria with acute onset of urinary retention abdominal pain a couple hours prior to arrival.  Prior to my exam, patient was assessed by nursing staff and noticed significant suprapubic distention.  Bladder scan with approximately 350 mL.  High catheter was placed with significant bloody drainage, > 400 mL.  Patient had significant improvement of abdominal distention and pain after catheter placement    ED Course as of 04/22/23 2133   Sat Apr 22, 2023 2127 Patient with approximate 600 cc gross " hematuria output since High placement  H&H stable but lower than baseline, 11.3/35.5  Normal renal function [AG]    I discussed case with Urology, Dr. Barba, who states acceptable to place patient in ED OU for further monitoring H&H and hematuria and will consult in the morning.      ED Course User Index  [AG] Sergey Ga PA-C           Diagnostic Impression:    1. Gross hematuria                 Sergey Ga PA-C  04/23/23 1318       Sergey Ga PA-C  04/23/23 1320

## 2023-04-23 NOTE — ED NOTES
Patient sleeping , no distress noted . Respirations even/unlabored. VSS . Will continue to monitor

## 2023-04-23 NOTE — SUBJECTIVE & OBJECTIVE
Past Medical History:   Diagnosis Date    Hypertension     Stroke        History reviewed. No pertinent surgical history.    Review of patient's allergies indicates:  No Known Allergies    Family History       Problem Relation (Age of Onset)    Alcohol abuse Father    Hypertension Mother, Paternal Aunt, Paternal Grandmother, Paternal Grandfather    No Known Problems Sister, Daughter, Son            Tobacco Use    Smoking status: Never    Smokeless tobacco: Never   Substance and Sexual Activity    Alcohol use: Not Currently     Comment: no alcohol for 6 months    Drug use: Never    Sexual activity: Not Currently       Review of Systems    Objective:     Temp:  [97.7 °F (36.5 °C)-97.9 °F (36.6 °C)] 97.7 °F (36.5 °C)  Pulse:  [69-87] 80  Resp:  [18] 18  SpO2:  [96 %-99 %] 97 %  BP: (121-158)/(67-94) 125/76     Body mass index is 25.06 kg/m².    Date 04/23/23 0700 - 04/24/23 0659   Shift 4377-9425 6616-7454 7826-6781 24 Hour Total   INTAKE   Shift Total(mL/kg)       OUTPUT   Urine(mL/kg/hr) 650   650   Shift Total(mL/kg) 650(9)   650(9)   Weight (kg) 72.6 72.6 72.6 72.6          Drains       Drain  Duration                  Urethral Catheter 04/22/23 2008 16 Fr. <1 day         Urethral Catheter 04/22/23 2011 Straight-tip 16 Fr. <1 day                    Physical Exam    Ao*4  resp normal rate' breathing not labored  cv normal rate  Ab nondistended  Ext no edema  Bladder is not distended  16 [place] High is in place with dark red urine      Significant Labs:    BMP:  Recent Labs   Lab 04/22/23 2026 04/23/23  0539    143   K 3.9 3.8    103   CO2 30* 30*   BUN 20 16   CREATININE 0.9 0.8   CALCIUM 8.8 8.8       CBC:  Recent Labs   Lab 04/22/23 2026 04/22/23 2351 04/23/23  0539   WBC 7.76 9.23 8.46   HGB 11.3* 11.0* 11.2*   HCT 35.5* 34.3* 34.7*    239 248       Urine Culture:   Recent Labs   Lab 04/20/23 1958   LABURIN No growth     Urine Studies:   Recent Labs   Lab 04/20/23 1958 04/22/23 2013    COLORU Brown* Red*   APPEARANCEUA Hazy* Cloudy*   PHUR 6.0 7.0   SPECGRAV <1.005* 1.020   PROTEINUA 1+* 3+*   GLUCUA Negative Negative   KETONESU Negative Negative   BILIRUBINUA Negative Negative   OCCULTUA 3+* 3+*   NITRITE Negative Negative   UROBILINOGEN Negative Negative   LEUKOCYTESUR Trace* Negative   RBCUA >100* >100*   WBCUA 61* 0   BACTERIA Occasional Rare   HYALINECASTS 0 0       Significant Imaging:  -

## 2023-04-23 NOTE — ED NOTES
Blood drawn and sent to lab at this time. Patient refused PO medications at present. Pillow provided and lights dimmed per patient request. Patient denies further needs or pain at present. Call light within reach. Side rails raised x 2 . VSS . Will continue to monitor.

## 2023-04-23 NOTE — ED NOTES
Patient awake / alert at present . VSS . Patient denies any needs or pain at present. Will continue to monitor.

## 2023-04-23 NOTE — H&P
ED Observation Unit  History and Physical      I assumed care of this patient from the Main ED at onset of observation time, 1100 on 04/23/2023.       History of Present Illness:    Gregorio Bishop is a 83 y.o. male with hypertension, CHF (EF 15%), and history of CVA and NSTEMI (not a candidate for revascularization or ICD), currently in [facility] who presented to the emergency department yesterday with urinary retention which began approximately 5-6 hours prior to arrival.  On 04/18, patient developed hematuria with dysuria.  He was seen at [facility] ED on 04/20 and was diagnosed with a UTI and given Rocephin in the ED and sent home with Keflex.  He states hematuria has not resolved.  Urine culture without growth.  No fever or chills or vomiting.  He had abdominal pain and distention and was given fentanyl on route.   Bladder scan revealed greater than 350 mL and patient had relief and significant hematuria upon High catheter placement (approximately 650 cc).  CBC with stable anemia and chemistry with normal renal function.    Patient admitted to ED OU for further observation given significant hematuria to monitor H&H and urology consult.  DNR status was placed under full code given patient unable to clearly state that he would not like resuscitation or intubation.    PMHx   Past Medical History:   Diagnosis Date    Hypertension     Stroke       History reviewed. No pertinent surgical history.     Family Hx   Family History   Problem Relation Age of Onset    Hypertension Mother     Alcohol abuse Father     No Known Problems Sister     No Known Problems Daughter     No Known Problems Son     Hypertension Paternal Aunt     Hypertension Paternal Grandmother     Hypertension Paternal Grandfather         Social Hx   Social History     Socioeconomic History    Marital status:    Tobacco Use    Smoking status: Never    Smokeless tobacco: Never   Substance and Sexual Activity    Alcohol use: Not  Currently     Comment: no alcohol for 6 months    Drug use: Never    Sexual activity: Not Currently     Social Determinants of Health     Financial Resource Strain: Low Risk     Difficulty of Paying Living Expenses: Not hard at all   Food Insecurity: No Food Insecurity    Worried About Running Out of Food in the Last Year: Never true    Ran Out of Food in the Last Year: Never true   Transportation Needs: No Transportation Needs    Lack of Transportation (Medical): No    Lack of Transportation (Non-Medical): No   Physical Activity: Inactive    Days of Exercise per Week: 0 days    Minutes of Exercise per Session: 0 min   Stress: Stress Concern Present    Feeling of Stress : Rather much   Social Connections: Moderately Integrated    Frequency of Communication with Friends and Family: More than three times a week    Frequency of Social Gatherings with Friends and Family: Three times a week    Attends Yarsani Services: More than 4 times per year    Active Member of Clubs or Organizations: No    Attends Club or Organization Meetings: Never    Marital Status:    Housing Stability: Low Risk     Unable to Pay for Housing in the Last Year: No    Number of Places Lived in the Last Year: 1    Unstable Housing in the Last Year: No        Vital Signs   Vitals:    04/23/23 0810 04/23/23 0928 04/23/23 1012 04/23/23 1216   BP: 122/72 125/76 122/88 123/70   BP Location: Left arm   Left arm   Patient Position: Lying   Lying   Pulse: 69 80  76   Resp: 18   16   Temp: 97.7 °F (36.5 °C)   97.3 °F (36.3 °C)   TempSrc: Oral   Oral   SpO2: 97%   96%   Weight:       Height:            Review of Systems  As per HPI    Physical Exam  Nursing note and vital signs reviewed.  Appearance: No acute distress.  Frail, elderly  Eyes: No conjunctival injection.  ENT: Oropharynx clear.    Chest/ Respiratory: Clear to auscultation bilaterally.  Good air movement.  No wheezes.  No rhonchi. No rales. No accessory muscle use.  Cardiovascular:  Regular rate and rhythm.  No murmurs. No gallops. No rubs.  Abdomen: Soft.  Not distended.  Nontender.  No guarding.  No rebound. Non-peritoneal.  :  Dark red urine in High bag  Musculoskeletal: Good range of motion all joints.  No deformities.  Neck supple.  No meningismus.  Skin: No rashes seen.  Good turgor.  No abrasions.  No ecchymoses.  Neurologic: Motor intact.  Sensation intact.   Mental Status:  Alert and oriented x 3.  Appropriate, conversant.     Medications:   Scheduled Meds:   atorvastatin  80 mg Oral QHS    carvediloL  3.125 mg Oral BID    digoxin  0.125 mg Oral Daily    sacubitriL-valsartan  1 tablet Oral BID     Continuous Infusions:  PRN Meds:.acetaminophen, melatonin, morphine, ondansetron      Assessment/Plan:    1) urinary retention with hematuria  Plan:  Serial CBC, urology consult, High care

## 2023-04-23 NOTE — HPI
83-year-old man presented to the emergency room with clot urinary retention.  He is taking Brilinta and aspirin.  PVR was approximately 300 cc.  A 16 [place] High was placed and approximately 450 cc were evacuated from his bladder which resolved his discomfort.  He has been observed in the ED observation unit overnight.  He is produced 650 cc of bloody urine.  He is afebrile and hemodynamically stable.  His hematocrit is stable    He was recently treated for urinary tract infection.  Follow up UA today is negative accept blood    He denies a history of prostate issues in the past

## 2023-04-23 NOTE — ED NOTES
Daughter in-law called requesting an update on pt. Ticola: [phone]. States she will come visit later.

## 2023-04-23 NOTE — ED NOTES
Patient sleeping , no distress noted. VSS .Respirations even/ unlabored. Call light within reach , will continue to monitor.

## 2023-04-23 NOTE — ASSESSMENT & PLAN NOTE
Resolved with irrigation.  Keep High in place for 3 days.  Okay to discharge home with High in place from my standpoint    I would hold the Brilinta and aspirin if okay with his primary team    There is some question whether he is on hospice or not.  If he is on hospice, I would recommend a voiding trial in 3 days with the hospice nurse.     If he is not on hospice, he should follow-up with Reshma Gonzalez NP in the Urology Clinic for 3 days with a voiding trial and she will schedule a outpatient hematuria evaluation

## 2023-04-23 NOTE — CONSULTS
[facility] Emergency Dept (Observation)  Urology  Consult Note    Patient Name: Gregorio Bishop  MRN: [id number]  Admission Date: 4/22/2023  Hospital Length of Stay: 0   Code Status: Full Code   Attending Provider: No att. providers found   Consulting Provider: Radhames Barba MD  Primary Care Physician: Renaldo Molina MD  Principal Problem:<principal problem not specified>    Consults    Subjective:     HPI:  83-year-old man presented to the emergency room with clot urinary retention.  He is taking Brilinta and aspirin.  PVR was approximately 300 cc.  A 16 [place] Islander High was placed and approximately 450 cc were evacuated from his bladder which resolved his discomfort.  He has been observed in the ED observation unit overnight.  He is produced 650 cc of bloody urine.  He is afebrile and hemodynamically stable.  His hematocrit is stable    He was recently treated for urinary tract infection.  Follow up UA today is negative accept blood    He denies a history of prostate issues in the past        Past Medical History:   Diagnosis Date    Hypertension     Stroke        History reviewed. No pertinent surgical history.    Review of patient's allergies indicates:  No Known Allergies    Family History       Problem Relation (Age of Onset)    Alcohol abuse Father    Hypertension Mother, Paternal Aunt, Paternal Grandmother, Paternal Grandfather    No Known Problems Sister, Daughter, Son            Tobacco Use    Smoking status: Never    Smokeless tobacco: Never   Substance and Sexual Activity    Alcohol use: Not Currently     Comment: no alcohol for 6 months    Drug use: Never    Sexual activity: Not Currently       Review of Systems    Objective:     Temp:  [97.7 °F (36.5 °C)-97.9 °F (36.6 °C)] 97.7 °F (36.5 °C)  Pulse:  [69-87] 80  Resp:  [18] 18  SpO2:  [96 %-99 %] 97 %  BP: (121-158)/(67-94) 125/76     Body mass index is 25.06 kg/m².    Date 04/23/23 0700 - 04/24/23 0659   Shift 0176-9110 1466-3665 5786-6324 24 Hour  Total   INTAKE   Shift Total(mL/kg)       OUTPUT   Urine(mL/kg/hr) 650   650   Shift Total(mL/kg) 650(9)   650(9)   Weight (kg) 72.6 72.6 72.6 72.6          Drains       Drain  Duration                  Urethral Catheter 04/22/23 2008 16 Fr. <1 day         Urethral Catheter 04/22/23 2011 Straight-tip 16 Fr. <1 day                    Physical Exam    Ao*4  resp normal rate' breathing not labored  cv normal rate  Ab nondistended  Ext no edema  Bladder is not distended  16 Serbian High is in place with dark red urine      Significant Labs:    BMP:  Recent Labs   Lab 04/22/23 2026 04/23/23  0539    143   K 3.9 3.8    103   CO2 30* 30*   BUN 20 16   CREATININE 0.9 0.8   CALCIUM 8.8 8.8       CBC:  Recent Labs   Lab 04/22/23 2026 04/22/23  2351 04/23/23  0539   WBC 7.76 9.23 8.46   HGB 11.3* 11.0* 11.2*   HCT 35.5* 34.3* 34.7*    239 248       Urine Culture:   Recent Labs   Lab 04/20/23 1958   LABURIN No growth     Urine Studies:   Recent Labs   Lab 04/20/23 1958 04/22/23 2013   COLORU Brown* Red*   APPEARANCEUA Hazy* Cloudy*   PHUR 6.0 7.0   SPECGRAV <1.005* 1.020   PROTEINUA 1+* 3+*   GLUCUA Negative Negative   KETONESU Negative Negative   BILIRUBINUA Negative Negative   OCCULTUA 3+* 3+*   NITRITE Negative Negative   UROBILINOGEN Negative Negative   LEUKOCYTESUR Trace* Negative   RBCUA >100* >100*   WBCUA 61* 0   BACTERIA Occasional Rare   HYALINECASTS 0 0       Significant Imaging:  -                      Assessment and Plan:     Gross hematuria  Resolved with irrigation.  Keep High in place for 3 days.  Okay to discharge home with High in place from my standpoint    I would hold the Brilinta and aspirin if okay with his primary team    There is some question whether he is on hospice or not.  If he is on hospice, I would recommend a voiding trial in 3 days with the hospice nurse.     If he is not on hospice, he should follow-up with Reshma Gonzalez NP in the Urology Clinic for 3 days with a  voiding trial and she will schedule a outpatient hematuria evaluation        VTE Risk Mitigation (From admission, onward)         Ordered     Place sequential compression device  Until discontinued         04/22/23 2207              I spent 60 min on the day of this encounter preparing for, treating and managing the above    Thank you for your consult. I will sign off. Please contact us if you have any additional questions.    Radhames Barba MD  Urology  [facility] - Emergency Dept (Observation)

## 2023-04-23 NOTE — ED NOTES
Patient to ED bed 6 via EMS. Per EMS patient on Hospice and was given Morphine and Fentanyl PTA with no relief .

## 2023-04-23 NOTE — DISCHARGE SUMMARY
ED Observation Unit  Discharge Summary        History of Present Illness:    Gregorio Bishop is a 83 y.o. male with hypertension, CHF (EF 15%), and history of CVA and NSTEMI (not a candidate for revascularization or ICD), currently in [facility] who presented to the emergency department yesterday with urinary retention which began approximately 5-6 hours prior to arrival.  On 04/18, patient developed hematuria with dysuria.  He was seen at [facility], [place], operated by [facility] ED on 04/20 and was diagnosed with a UTI and given Rocephin in the ED and sent home with Keflex.  He states hematuria has not resolved.  Urine culture without growth.  No fever or chills or vomiting.  He had abdominal pain and distention and was given fentanyl on route.   Bladder scan revealed greater than 350 mL and patient had relief and significant hematuria upon High catheter placement (approximately 650 cc).  Urinalysis without signs of infection.  CBC with stable anemia and chemistry with normal renal function.    Observation Course:    No acute events.  Hematuria cleared after urology flushed fully but subsequently returned without clots.  Dr. Barba not concerned with degree of hematuria.  H&H stable.    Consultants:    Urology- recommends keep fully in place with 3 days.  Have hospice nurse do a voiding trial in 3 days.    Final Diagnosis:  1. Gross hematuria         Discharge Condition: Stable    Disposition: Home hospice      Time spent on the discharge of the patient including review of hospital course with the patient. reviewing discharge medications and arranging follow-up care 35 minutes.  Patient was seen and examined on the date of discharge and determined to be suitable for discharge.    Follow Up:  Future Appointments   Date Time Provider Department Center   5/15/2023  2:15 PM Trish Ewing OD [facility] [place]

## 2023-04-23 NOTE — ED TRIAGE NOTES
Pt c/o not voiding x1day when voiding yesterday pt noticed blood in urine. Pt seen in ED Thursday for s/s and started on Furosamide

## 2023-04-23 NOTE — ED NOTES
Pt awake and alert; resting quietly on stretcher. VS stable; check q4 hours. Pt denies pain at this time; no acute distress or discomfort reported or observed.  Pt denies restroom needs at this time. High in place. Bed locked in lowest position; side rails up and locked x 2; call light, bedside table, and personal belongings within reach. Room assessed for safety measures and cleanliness; no action needed at this time. Plan of care discussed.  Pt instructed to alert nurse for assistance; verbalizes understanding. Pt denies needs or complaints at this time; will continue to monitor.

## 2023-05-15 ENCOUNTER — OFFICE VISIT (OUTPATIENT)
Dept: OPTOMETRY | Facility: CLINIC | Age: 83
End: 2023-05-15
Payer: OTHER MISCELLANEOUS

## 2023-05-15 DIAGNOSIS — H25.13 NUCLEAR SCLEROSIS OF BOTH EYES: Primary | ICD-10-CM

## 2023-05-15 DIAGNOSIS — H40.013 OPEN ANGLE WITH BORDERLINE FINDINGS OF BOTH EYES: ICD-10-CM

## 2023-05-15 PROCEDURE — 92004 COMPRE OPH EXAM NEW PT 1/>: CPT | Mod: GW,S$PBB,ICN, | Performed by: OPTOMETRIST

## 2023-05-15 PROCEDURE — 99213 OFFICE O/P EST LOW 20 MIN: CPT | Mod: PBBFAC | Performed by: OPTOMETRIST

## 2023-05-15 PROCEDURE — 99999 PR PBB SHADOW E&M-EST. PATIENT-LVL III: ICD-10-PCS | Mod: PBBFAC,,, | Performed by: OPTOMETRIST

## 2023-05-15 PROCEDURE — 92004 PR EYE EXAM, NEW PATIENT,COMPREHESV: ICD-10-PCS | Mod: GW,S$PBB,ICN, | Performed by: OPTOMETRIST

## 2023-05-15 PROCEDURE — 99999 PR PBB SHADOW E&M-EST. PATIENT-LVL III: CPT | Mod: PBBFAC,,, | Performed by: OPTOMETRIST

## 2023-05-15 NOTE — PROGRESS NOTES
HPI    Last eye exam was approximately 5 years ago.  Patient states vision has gotten blurry. Has been prescribed glasses   ([employer]) in the past but never wore them.  Patient denies diplopia, headaches, flashes/floaters, and pain.    Last edited by Judy Montes De Oca MA on 5/15/2023  2:06 PM.            Assessment /Plan     For exam results, see Encounter Report.    Nuclear sclerosis of both eyes  -     Ambulatory referral/consult to Optometry    Open angle with borderline findings of both eyes            L>R  Educated on cataracts and affects on vision.  Refer for cataract consult.  L>R  Educated pt.  Refer for glaucoma consult.

## 2023-10-19 ENCOUNTER — HOSPITAL ENCOUNTER (INPATIENT)
Facility: HOSPITAL | Age: 83
LOS: 2 days | Discharge: HOME | DRG: 699 | End: 2023-10-21
Attending: EMERGENCY MEDICINE | Admitting: INTERNAL MEDICINE
Payer: MEDICARE

## 2023-10-19 DIAGNOSIS — R07.9 CHEST PAIN: ICD-10-CM

## 2023-10-19 DIAGNOSIS — T83.511A URINARY TRACT INFECTION ASSOCIATED WITH INDWELLING URETHRAL CATHETER, INITIAL ENCOUNTER: ICD-10-CM

## 2023-10-19 DIAGNOSIS — I69.351 HEMIPLGA FOLLOWING CEREBRAL INFRC AFF RIGHT DOMINANT SIDE: Chronic | ICD-10-CM

## 2023-10-19 DIAGNOSIS — R78.81 E. COLI BACTEREMIA: ICD-10-CM

## 2023-10-19 DIAGNOSIS — Z86.73 HISTORY OF STROKE: ICD-10-CM

## 2023-10-19 DIAGNOSIS — Z51.5 PALLIATIVE CARE STATUS: ICD-10-CM

## 2023-10-19 DIAGNOSIS — I10 ESSENTIAL HYPERTENSION: ICD-10-CM

## 2023-10-19 DIAGNOSIS — N39.0 URINARY TRACT INFECTION ASSOCIATED WITH INDWELLING URETHRAL CATHETER, INITIAL ENCOUNTER: ICD-10-CM

## 2023-10-19 DIAGNOSIS — I42.0 DILATED CARDIOMYOPATHY: ICD-10-CM

## 2023-10-19 DIAGNOSIS — B96.20 E. COLI BACTEREMIA: ICD-10-CM

## 2023-10-19 DIAGNOSIS — R78.81 BACTEREMIA DUE TO GRAM-NEGATIVE BACTERIA: ICD-10-CM

## 2023-10-19 DIAGNOSIS — I50.42 CHRONIC COMBINED SYSTOLIC AND DIASTOLIC HEART FAILURE: ICD-10-CM

## 2023-10-19 DIAGNOSIS — R78.81 BACTEREMIA: Primary | ICD-10-CM

## 2023-10-19 LAB
ALBUMIN SERPL BCP-MCNC: 3.3 G/DL (ref 3.5–5.2)
ALLENS TEST: NORMAL
ALP SERPL-CCNC: 64 U/L (ref 55–135)
ALT SERPL W/O P-5'-P-CCNC: 10 U/L (ref 10–44)
ANION GAP SERPL CALC-SCNC: 11 MMOL/L (ref 8–16)
AST SERPL-CCNC: 16 U/L (ref 10–40)
BACTERIA #/AREA URNS AUTO: ABNORMAL /HPF
BASOPHILS # BLD AUTO: 0.04 K/UL (ref 0–0.2)
BASOPHILS NFR BLD: 0.2 % (ref 0–1.9)
BILIRUB SERPL-MCNC: 0.6 MG/DL (ref 0.1–1)
BILIRUB UR QL STRIP: NEGATIVE
BNP SERPL-MCNC: 2777 PG/ML (ref 0–99)
BUN SERPL-MCNC: 22 MG/DL (ref 8–23)
CALCIUM SERPL-MCNC: 9.2 MG/DL (ref 8.7–10.5)
CHLORIDE SERPL-SCNC: 104 MMOL/L (ref 95–110)
CLARITY UR REFRACT.AUTO: ABNORMAL
CO2 SERPL-SCNC: 24 MMOL/L (ref 23–29)
COLOR UR AUTO: YELLOW
CREAT SERPL-MCNC: 1 MG/DL (ref 0.5–1.4)
DIFFERENTIAL METHOD: ABNORMAL
EOSINOPHIL # BLD AUTO: 0 K/UL (ref 0–0.5)
EOSINOPHIL NFR BLD: 0 % (ref 0–8)
ERYTHROCYTE [DISTWIDTH] IN BLOOD BY AUTOMATED COUNT: 14.6 % (ref 11.5–14.5)
EST. GFR  (NO RACE VARIABLE): >60 ML/MIN/1.73 M^2
GLUCOSE SERPL-MCNC: 137 MG/DL (ref 70–110)
GLUCOSE UR QL STRIP: NEGATIVE
HCT VFR BLD AUTO: 34.3 % (ref 40–54)
HGB BLD-MCNC: 10.5 G/DL (ref 14–18)
HGB UR QL STRIP: ABNORMAL
HYALINE CASTS UR QL AUTO: 0 /LPF
IMM GRANULOCYTES # BLD AUTO: 0.11 K/UL (ref 0–0.04)
IMM GRANULOCYTES NFR BLD AUTO: 0.6 % (ref 0–0.5)
KETONES UR QL STRIP: NEGATIVE
LACTATE SERPL-SCNC: 1.7 MMOL/L (ref 0.5–2.2)
LDH SERPL L TO P-CCNC: 2.01 MMOL/L (ref 0.5–2.2)
LEUKOCYTE ESTERASE UR QL STRIP: ABNORMAL
LYMPHOCYTES # BLD AUTO: 1.3 K/UL (ref 1–4.8)
LYMPHOCYTES NFR BLD: 6.7 % (ref 18–48)
MAGNESIUM SERPL-MCNC: 2.2 MG/DL (ref 1.6–2.6)
MCH RBC QN AUTO: 28.2 PG (ref 27–31)
MCHC RBC AUTO-ENTMCNC: 30.6 G/DL (ref 32–36)
MCV RBC AUTO: 92 FL (ref 82–98)
MICROSCOPIC COMMENT: ABNORMAL
MONOCYTES # BLD AUTO: 1.6 K/UL (ref 0.3–1)
MONOCYTES NFR BLD: 8.1 % (ref 4–15)
NEUTROPHILS # BLD AUTO: 16.6 K/UL (ref 1.8–7.7)
NEUTROPHILS NFR BLD: 84.4 % (ref 38–73)
NITRITE UR QL STRIP: NEGATIVE
NRBC BLD-RTO: 0 /100 WBC
PH UR STRIP: 5 [PH] (ref 5–8)
PLATELET # BLD AUTO: 285 K/UL (ref 150–450)
PMV BLD AUTO: 10.2 FL (ref 9.2–12.9)
POC CARDIAC TROPONIN I: 0.05 NG/ML (ref 0–0.08)
POTASSIUM SERPL-SCNC: 4.2 MMOL/L (ref 3.5–5.1)
PROT SERPL-MCNC: 7.3 G/DL (ref 6–8.4)
PROT UR QL STRIP: ABNORMAL
RBC # BLD AUTO: 3.73 M/UL (ref 4.6–6.2)
RBC #/AREA URNS AUTO: 15 /HPF (ref 0–4)
SAMPLE: NORMAL
SAMPLE: NORMAL
SITE: NORMAL
SODIUM SERPL-SCNC: 139 MMOL/L (ref 136–145)
SP GR UR STRIP: 1.02 (ref 1–1.03)
SQUAMOUS #/AREA URNS AUTO: 0 /HPF
TROPONIN I SERPL DL<=0.01 NG/ML-MCNC: 0.05 NG/ML (ref 0–0.03)
URN SPEC COLLECT METH UR: ABNORMAL
WBC # BLD AUTO: 19.66 K/UL (ref 3.9–12.7)
WBC #/AREA URNS AUTO: >100 /HPF (ref 0–5)
WBC CLUMPS UR QL AUTO: ABNORMAL

## 2023-10-19 PROCEDURE — 25000003 PHARM REV CODE 250: Performed by: EMERGENCY MEDICINE

## 2023-10-19 PROCEDURE — 93010 EKG 12-LEAD: ICD-10-PCS | Mod: 76,,, | Performed by: INTERNAL MEDICINE

## 2023-10-19 PROCEDURE — 93005 ELECTROCARDIOGRAM TRACING: CPT

## 2023-10-19 PROCEDURE — 63600175 PHARM REV CODE 636 W HCPCS: Performed by: EMERGENCY MEDICINE

## 2023-10-19 PROCEDURE — 80053 COMPREHEN METABOLIC PANEL: CPT | Mod: 91 | Performed by: NURSE PRACTITIONER

## 2023-10-19 PROCEDURE — 83735 ASSAY OF MAGNESIUM: CPT | Performed by: EMERGENCY MEDICINE

## 2023-10-19 PROCEDURE — 93010 ELECTROCARDIOGRAM REPORT: CPT | Mod: 76,,, | Performed by: INTERNAL MEDICINE

## 2023-10-19 PROCEDURE — 99900035 HC TECH TIME PER 15 MIN (STAT)

## 2023-10-19 PROCEDURE — 93010 ELECTROCARDIOGRAM REPORT: CPT | Mod: ,,, | Performed by: INTERNAL MEDICINE

## 2023-10-19 PROCEDURE — 83880 ASSAY OF NATRIURETIC PEPTIDE: CPT | Mod: 91 | Performed by: NURSE PRACTITIONER

## 2023-10-19 PROCEDURE — 85025 COMPLETE CBC W/AUTO DIFF WBC: CPT | Mod: 91 | Performed by: NURSE PRACTITIONER

## 2023-10-19 PROCEDURE — 81001 URINALYSIS AUTO W/SCOPE: CPT | Mod: 91 | Performed by: NURSE PRACTITIONER

## 2023-10-19 PROCEDURE — 87086 URINE CULTURE/COLONY COUNT: CPT | Mod: 59 | Performed by: NURSE PRACTITIONER

## 2023-10-19 PROCEDURE — 84484 ASSAY OF TROPONIN QUANT: CPT | Mod: 91 | Performed by: NURSE PRACTITIONER

## 2023-10-19 PROCEDURE — 94761 N-INVAS EAR/PLS OXIMETRY MLT: CPT

## 2023-10-19 PROCEDURE — 87040 BLOOD CULTURE FOR BACTERIA: CPT | Mod: 59 | Performed by: NURSE PRACTITIONER

## 2023-10-19 PROCEDURE — 12000002 HC ACUTE/MED SURGE SEMI-PRIVATE ROOM

## 2023-10-19 PROCEDURE — 83605 ASSAY OF LACTIC ACID: CPT

## 2023-10-19 PROCEDURE — 83605 ASSAY OF LACTIC ACID: CPT | Performed by: INTERNAL MEDICINE

## 2023-10-19 RX ORDER — IBUPROFEN 200 MG
16 TABLET ORAL
Status: DISCONTINUED | OUTPATIENT
Start: 2023-10-19 | End: 2023-10-21 | Stop reason: HOSPADM

## 2023-10-19 RX ORDER — TALC
6 POWDER (GRAM) TOPICAL NIGHTLY PRN
Status: DISCONTINUED | OUTPATIENT
Start: 2023-10-19 | End: 2023-10-21 | Stop reason: HOSPADM

## 2023-10-19 RX ORDER — NALOXONE HCL 0.4 MG/ML
0.02 VIAL (ML) INJECTION
Status: DISCONTINUED | OUTPATIENT
Start: 2023-10-19 | End: 2023-10-21 | Stop reason: HOSPADM

## 2023-10-19 RX ORDER — SODIUM CHLORIDE 0.9 % (FLUSH) 0.9 %
10 SYRINGE (ML) INJECTION EVERY 12 HOURS PRN
Status: DISCONTINUED | OUTPATIENT
Start: 2023-10-19 | End: 2023-10-21 | Stop reason: HOSPADM

## 2023-10-19 RX ORDER — ENOXAPARIN SODIUM 100 MG/ML
40 INJECTION SUBCUTANEOUS EVERY 24 HOURS
Status: DISCONTINUED | OUTPATIENT
Start: 2023-10-19 | End: 2023-10-21 | Stop reason: HOSPADM

## 2023-10-19 RX ORDER — GLUCAGON 1 MG
1 KIT INJECTION
Status: DISCONTINUED | OUTPATIENT
Start: 2023-10-19 | End: 2023-10-21 | Stop reason: HOSPADM

## 2023-10-19 RX ORDER — IBUPROFEN 200 MG
24 TABLET ORAL
Status: DISCONTINUED | OUTPATIENT
Start: 2023-10-19 | End: 2023-10-21 | Stop reason: HOSPADM

## 2023-10-19 RX ORDER — ACETAMINOPHEN 325 MG/1
650 TABLET ORAL EVERY 4 HOURS PRN
Status: DISCONTINUED | OUTPATIENT
Start: 2023-10-19 | End: 2023-10-21 | Stop reason: HOSPADM

## 2023-10-19 RX ADMIN — CEFTRIAXONE 2 G: 2 INJECTION, POWDER, FOR SOLUTION INTRAMUSCULAR; INTRAVENOUS at 09:10

## 2023-10-19 NOTE — Clinical Note
Diagnosis: Chest pain [392366]   Admitting Provider:: MIKE CORTEZ [[id number]]   Future Attending Provider: MIKE CORTEZ [[id number]]   Reason for IP Medical Treatment  (Clinical interventions that can only be accomplished in the IP setting? ) :: antibiotics   I certify that Inpatient services for greater than or equal to 2 midnights are medically necessary:: Yes   Plans for Post-Acute care--if anticipated (pick the single best option):: A. No post acute care anticipated at this time

## 2023-10-20 PROBLEM — Z51.5 PALLIATIVE CARE STATUS: Status: ACTIVE | Noted: 2023-10-20

## 2023-10-20 PROBLEM — B96.20 E. COLI BACTEREMIA: Status: ACTIVE | Noted: 2023-10-19

## 2023-10-20 PROBLEM — R41.0 DELIRIUM: Status: RESOLVED | Noted: 2022-12-03 | Resolved: 2023-10-20

## 2023-10-20 PROBLEM — R53.81 DEBILITY: Status: RESOLVED | Noted: 2020-11-20 | Resolved: 2023-10-20

## 2023-10-20 PROBLEM — G81.91 ACUTE RIGHT HEMIPARESIS: Status: RESOLVED | Noted: 2021-09-07 | Resolved: 2023-10-20

## 2023-10-20 PROBLEM — R79.89 ELEVATED TROPONIN: Status: RESOLVED | Noted: 2020-11-19 | Resolved: 2023-10-20

## 2023-10-20 PROBLEM — Z71.89 GOALS OF CARE, COUNSELING/DISCUSSION: Status: RESOLVED | Noted: 2022-12-01 | Resolved: 2023-10-20

## 2023-10-20 PROBLEM — R41.82 AMS (ALTERED MENTAL STATUS): Status: RESOLVED | Noted: 2022-11-26 | Resolved: 2023-10-20

## 2023-10-20 PROBLEM — R42 DIZZINESS: Status: RESOLVED | Noted: 2020-11-19 | Resolved: 2023-10-20

## 2023-10-20 PROBLEM — R47.1 DYSARTHRIA AND ANARTHRIA: Status: RESOLVED | Noted: 2022-11-30 | Resolved: 2023-10-20

## 2023-10-20 LAB
ALBUMIN SERPL BCP-MCNC: 3.2 G/DL (ref 3.5–5.2)
ALLENS TEST: NORMAL
ALP SERPL-CCNC: 60 U/L (ref 55–135)
ALT SERPL W/O P-5'-P-CCNC: 11 U/L (ref 10–44)
ANION GAP SERPL CALC-SCNC: 11 MMOL/L (ref 8–16)
AST SERPL-CCNC: 26 U/L (ref 10–40)
BASOPHILS # BLD AUTO: 0.04 K/UL (ref 0–0.2)
BASOPHILS NFR BLD: 0.2 % (ref 0–1.9)
BILIRUB SERPL-MCNC: 0.8 MG/DL (ref 0.1–1)
BUN SERPL-MCNC: 24 MG/DL (ref 8–23)
CALCIUM SERPL-MCNC: 9.1 MG/DL (ref 8.7–10.5)
CHLORIDE SERPL-SCNC: 105 MMOL/L (ref 95–110)
CO2 SERPL-SCNC: 23 MMOL/L (ref 23–29)
CREAT SERPL-MCNC: 1 MG/DL (ref 0.5–1.4)
DIFFERENTIAL METHOD: ABNORMAL
EOSINOPHIL # BLD AUTO: 0 K/UL (ref 0–0.5)
EOSINOPHIL NFR BLD: 0.1 % (ref 0–8)
ERYTHROCYTE [DISTWIDTH] IN BLOOD BY AUTOMATED COUNT: 13.4 % (ref 11.5–14.5)
EST. GFR  (NO RACE VARIABLE): >60 ML/MIN/1.73 M^2
GLUCOSE SERPL-MCNC: 122 MG/DL (ref 70–110)
HCT VFR BLD AUTO: 34.7 % (ref 40–54)
HGB BLD-MCNC: 10.9 G/DL (ref 14–18)
IMM GRANULOCYTES # BLD AUTO: 0.08 K/UL (ref 0–0.04)
IMM GRANULOCYTES NFR BLD AUTO: 0.4 % (ref 0–0.5)
LDH SERPL L TO P-CCNC: 1.38 MMOL/L (ref 0.5–2.2)
LYMPHOCYTES # BLD AUTO: 1.1 K/UL (ref 1–4.8)
LYMPHOCYTES NFR BLD: 6.2 % (ref 18–48)
MAGNESIUM SERPL-MCNC: 2 MG/DL (ref 1.6–2.6)
MCH RBC QN AUTO: 28.7 PG (ref 27–31)
MCHC RBC AUTO-ENTMCNC: 31.4 G/DL (ref 32–36)
MCV RBC AUTO: 91 FL (ref 82–98)
MONOCYTES # BLD AUTO: 2 K/UL (ref 0.3–1)
MONOCYTES NFR BLD: 10.9 % (ref 4–15)
NEUTROPHILS # BLD AUTO: 14.9 K/UL (ref 1.8–7.7)
NEUTROPHILS NFR BLD: 82.2 % (ref 38–73)
NRBC BLD-RTO: 0 /100 WBC
PHOSPHATE SERPL-MCNC: 3.1 MG/DL (ref 2.7–4.5)
PLATELET # BLD AUTO: 241 K/UL (ref 150–450)
PMV BLD AUTO: 9.8 FL (ref 9.2–12.9)
POTASSIUM SERPL-SCNC: 5 MMOL/L (ref 3.5–5.1)
PROT SERPL-MCNC: 7.3 G/DL (ref 6–8.4)
RBC # BLD AUTO: 3.8 M/UL (ref 4.6–6.2)
SAMPLE: NORMAL
SITE: NORMAL
SODIUM SERPL-SCNC: 139 MMOL/L (ref 136–145)
TROPONIN I SERPL DL<=0.01 NG/ML-MCNC: 0.05 NG/ML (ref 0–0.03)
WBC # BLD AUTO: 18.18 K/UL (ref 3.9–12.7)

## 2023-10-20 PROCEDURE — 84100 ASSAY OF PHOSPHORUS: CPT

## 2023-10-20 PROCEDURE — 99900035 HC TECH TIME PER 15 MIN (STAT)

## 2023-10-20 PROCEDURE — 25000003 PHARM REV CODE 250: Performed by: INTERNAL MEDICINE

## 2023-10-20 PROCEDURE — 63600175 PHARM REV CODE 636 W HCPCS: Performed by: INTERNAL MEDICINE

## 2023-10-20 PROCEDURE — 84484 ASSAY OF TROPONIN QUANT: CPT | Performed by: NURSE PRACTITIONER

## 2023-10-20 PROCEDURE — 25000003 PHARM REV CODE 250

## 2023-10-20 PROCEDURE — 83735 ASSAY OF MAGNESIUM: CPT

## 2023-10-20 PROCEDURE — 83605 ASSAY OF LACTIC ACID: CPT

## 2023-10-20 PROCEDURE — 21400001 HC TELEMETRY ROOM

## 2023-10-20 PROCEDURE — 82803 BLOOD GASES ANY COMBINATION: CPT

## 2023-10-20 PROCEDURE — 63600175 PHARM REV CODE 636 W HCPCS

## 2023-10-20 PROCEDURE — 80053 COMPREHEN METABOLIC PANEL: CPT

## 2023-10-20 PROCEDURE — 85025 COMPLETE CBC W/AUTO DIFF WBC: CPT

## 2023-10-20 RX ADMIN — ENOXAPARIN SODIUM 40 MG: 40 INJECTION SUBCUTANEOUS at 12:10

## 2023-10-20 RX ADMIN — ENOXAPARIN SODIUM 40 MG: 40 INJECTION SUBCUTANEOUS at 05:10

## 2023-10-20 RX ADMIN — GENTAMICIN SULFATE 200 MG: 40 INJECTION, SOLUTION INTRAMUSCULAR; INTRAVENOUS at 09:10

## 2023-10-20 RX ADMIN — CEFTRIAXONE 2 G: 2 INJECTION, POWDER, FOR SOLUTION INTRAMUSCULAR; INTRAVENOUS at 08:10

## 2023-10-20 RX ADMIN — TICAGRELOR 90 MG: 90 TABLET ORAL at 08:10

## 2023-10-20 NOTE — FIRST PROVIDER EVALUATION
Emergency Department TeleTriage Encounter Note      CHIEF COMPLAINT    Chief Complaint   Patient presents with    Chest Pain     Midsternal CP that started earlier today rated 10/10. Just discharged this morning, told to come back to ED d/t bacteremia       VITAL SIGNS   Initial Vitals [10/19/23 1904]   BP Pulse Resp Temp SpO2   110/69 89 20 98 °F (36.7 °C) 97 %      MAP       --            ALLERGIES    Review of patient's allergies indicates:  No Known Allergies    PROVIDER TRIAGE NOTE  Verbal consent for the teletriage evaluation was given by the patient at the start of the evaluation.  All efforts will be made to maintain patient's privacy during the evaluation.      This is a teletriage evaluation of a 83 y.o. male presenting to the ED per son for bacteria in blood cultures.  Also reports CP since last night.  Was called and told to come in for admission. Limited physical exam via telehealth: The patient is awake, alert, answering questions appropriately and is not in respiratory distress.  As the Teletriage provider, I performed an initial assessment and ordered appropriate labs and imaging studies, if any, to facilitate the patient's care once placed in the ED. Once a room is available, care and a full evaluation will be completed by an alternate ED provider.  Any additional orders and the final disposition will be determined by that provider.  All imaging and labs will not be followed-up by the Teletriage Team, including myself.          ORDERS  Labs Reviewed - No data to display    ED Orders (720h ago, onward)      Start Ordered     Status Ordering Provider    10/19/23 2235 10/19/23 1934  Troponin I #2  Once Timed        See Hyperspace for full Linked Orders Report.    Ordered OMAIRA FANG    10/19/23 2235 10/19/23 1934  POCT Troponin #2  Once        See Hyperspace for full Linked Orders Report.    Ordered OMAIRA FANG    10/19/23 1935 10/19/23 1934  Pulse Oximetry Continuous  Continuous         Ordered  OMAIRA FANG    10/19/23 1935 10/19/23 1934  Diet NPO  Diet effective now         Ordered OMAIRA FANG    10/19/23 1935 10/19/23 1934  Saline lock IV  Once         Ordered OMAIRA FANG    10/19/23 1935 10/19/23 1934  EKG 12-lead  Once        Comments: Do not perform if previously done during this visit/ triage    Ordered OMAIRA FANG    10/19/23 1935 10/19/23 1934  CBC auto differential  STAT         Ordered OMAIRA FANG    10/19/23 1935 10/19/23 1934  Comprehensive metabolic panel  STAT         Ordered OMAIRA FANG    10/19/23 1935 10/19/23 1934  Troponin I #1  STAT        See Hyperspace for full Linked Orders Report.    Ordered OMAIRA FANG    10/19/23 1935 10/19/23 1934  POCT Troponin #1  Once        See Hyperspace for full Linked Orders Report.    Ordered LEONCIOOMAIRA    10/19/23 1935 10/19/23 1934  B-Type natriuretic peptide (BNP)  STAT         Ordered OMAIRA FANG    10/19/23 1935 10/19/23 1934  X-Ray Chest AP Portable  1 time imaging         Ordered OMAIRA FANG    10/19/23 1935 10/19/23 1934  Blood Culture #1 **CANNOT BE ORDERED STAT**  Once         Ordered OMAIRA FANG    10/19/23 1935 10/19/23 1934  Blood Culture #2 **CANNOT BE ORDERED STAT**  Once         Ordered OMAIRA FANG    10/19/23 1935 10/19/23 1934  Lactic acid, plasma  STAT         Ordered OMAIRA FANG    10/19/23 1935 10/19/23 1934  Urinalysis, Reflex to Urine Culture Urine, Clean Catch  STAT         Ordered OMAIRA FANG    10/19/23 1934 10/19/23 1934  Vital signs  Every 15 min         Ordered OMAIRA FANG    10/19/23 1934 10/19/23 1934  Cardiac Monitoring - Adult  Continuous        Comments: Notify Physician If:    Ordered OMAIRA FANG    10/19/23 1908 10/19/23 1907  EKG 12-lead  Once         Completed by HONEY ABARCA on 10/19/2023 at  7:13 PM ALEX GUY              Virtual Visit Note: The provider triage portion of this emergency department evaluation and documentation was performed via [employer], a HIPAA-compliant  telemedicine application, in concert with a tele-presenter in the room. A face to face patient evaluation with one of my colleagues will occur once the patient is placed in an emergency department room.      DISCLAIMER: This note was prepared with [employer] voice recognition transcription software. Garbled syntax, mangled pronouns, and other bizarre constructions may be attributed to that software system.

## 2023-10-20 NOTE — ASSESSMENT & PLAN NOTE
Urinary tract infection associated with indwelling urethral catheter    83M with hx of CAD, advanced heart failure (10% EF, Grade III DD), HTN, CVA presenting from hospice with GNR bacteremia on blood cultures obtained the day prior in the ED. Initially presenting with confusion and increased somnolence, workup in the ED on 10/18 revealed UTI and pt was discharged with PO antibiotics until blood cultures resulted with GNR as well. Other labs with evidence of elevated BNP and troponin as well as WBC count. Lactic acid was normal. Patient well-appearing on my evaluation, but did report penile pain due to catheter. Otherwise denies any other symptoms. Exam notable for mild crackles in lung fields and some mild pitting edema on the Right ankle. Penile exam without any acute abnormalities, but pt reporting pain in the urethra at catheter site. Exam unremarkable otherwise. Patient likely bacteremic from UTI diagnosed on UA and clinically. Pt currently stable.    Plan:  - Continue IV Ceftriaxone 2g q24h for GNR bacteremia  - Follow-up blood and urine cultures to tailor antibiotics  - Consider imaging if no response to treatment or worsening symptoms  - High replaced for condom cathether

## 2023-10-20 NOTE — HPI
Gregorio Bishop is an 83 year old man with history of HTN, CVA, HFrEF (EF 10-15%) on hospice, who re-presents to the ED due to blood cultures showing bacteremia. with weakness, altered mentation, and flank pain. Patient with his son and daughter at bedside. He initially presented on evening of 10/18 due to his adult children noticing that the patient had been more somnolent, confused, and complaining of left flank pain and suprapubic pain. Labs notable for WBC 14.92 and UA with positive nitrites and leukocytosis with many bacteria; was diagnosed with UTI and family was agreeable to discharge with oral antibiotics after shared decision-making discussion with ED physician. However, blood cultures from 10/18 returned positive for GNR, so patient's family was notified and returned for IV antibiotics. His children report that he has a history of UTIs in the past, and they tell me that the Goldstein had been placed about 4 months ago and has not been changed out. They deny seeing any blood clots or hematuria in the goldstein container and the goldstein was exchanged on the last ED visit yesterday.    In the ED tonight on 10/19, patient is afebrile, hemodynamically stable, and stable on room air. Labs again notable for leukocytosis, WBC 19.66. UA pending. CXR with some pulmonary vascular congestion. CT head showed new hypoattenuation involving more anterior aspects of the left frontal lobe, in keeping likely remote ischemia. A small right cerebellar infarct was also noticed, but age-indeterminate. Pt given IV CTX and fluids were deferred in setting of HF with EF 10% and hemodynamic stability. On ROS, patient's ability to provide history is limited by his dementia, however he gestures to his penis when asked about where he is having pain. No other complaints elicited and family does not report noticing any other issues outside of presenting complaints. Exam relatively unremarkable with some crackles in lungs and ankle swelling.  Patient with some pain at the urethral opening, but no blood, discharge, or skin breakdown noted at meatus. Family are agreeable with admission for IV antibiotics, and pt admitted to Hospital Medicine for IV treatment of GNR bacteremia.

## 2023-10-20 NOTE — ED NOTES
Patient identifiers verified and correct for Gregorio Dario  LOC: The patient is awake, alert and aware of environment with an appropriate affect, the patient is oriented x 3 and speaking appropriately.   APPEARANCE: Patient appears comfortable and in no acute distress, patient is clean and well groomed.  SKIN: The skin is warm and dry, color consistent with ethnicity, patient has normal skin turgor and moist mucus membranes, skin intact, no breakdown or bruising noted.   MUSCULOSKELETAL: Patient is bed bound  RESPIRATORY: Airway is open and patent, respirations are spontaneous, patient has a normal effort and rate, no accessory muscle use noted, pt placed on continuous pulse ox with O2 sats noted at 95% on room air.  CARDIAC: Pt placed on cardiac monitor. Patient has a normal rate and regular rhythm, no edema noted, capillary refill < 3 seconds.   GASTRO: Soft and non tender to palpation, no distention noted, normoactive bowel sounds present in all four quadrants. Pt states bowel movements have been regular.  : Pt has goldstein in place, draining appropriately  NEURO: Pt opens eyes spontaneously, behavior appropriate to situation, follows commands, facial expression symmetrical, bilateral hand grasp equal and even, purposeful motor response noted, normal sensation in all extremities when touched with a finger.

## 2023-10-20 NOTE — ED NOTES
Pt AAOx4, resting comfortably in bed, NAD, respirations E/UL, updated on POC, wheels locked and in low position, call bell with in reach, Comfort positioning and restroom needs were addressed. Necessary items were placed with in reach and was advised when a reassessment would take place.

## 2023-10-20 NOTE — SUBJECTIVE & OBJECTIVE
Past Medical History:   Diagnosis Date    Hypertension     Stroke        No past surgical history on file.    Review of patient's allergies indicates:  No Known Allergies    Current Facility-Administered Medications on File Prior to Encounter   Medication    [COMPLETED] acetaminophen tablet 1,000 mg    [COMPLETED] cefTRIAXone (ROCEPHIN) 2 g in dextrose 5 % in water (D5W) 100 mL IVPB (MB+)    [DISCONTINUED] sodium chloride 0.9% bolus 500 mL 500 mL     Current Outpatient Medications on File Prior to Encounter   Medication Sig    atorvastatin (LIPITOR) 80 MG tablet Take 1 tablet (80 mg total) by mouth every evening.    carvediloL (COREG) 3.125 MG tablet Take 1 tablet (3.125 mg total) by mouth 2 (two) times daily.    cephALEXin (KEFLEX) 500 MG capsule Take 1 capsule (500 mg total) by mouth 4 (four) times daily. for 10 days    sacubitriL-valsartan (ENTRESTO) 24-26 mg per tablet Take 1 tablet by mouth 2 (two) times daily.    ticagrelor (BRILINTA) 90 mg tablet Take 1 tablet (90 mg total) by mouth 2 (two) times daily.    acetaminophen (TYLENOL) 325 MG tablet Take 650 mg by mouth every 8 (eight) hours as needed.    aspirin (ECOTRIN) 81 MG EC tablet Take 1 tablet (81 mg total) by mouth once daily.    cyanocobalamin (VITAMIN B-12) 1000 MCG tablet Take 1 tablet (1,000 mcg total) by mouth once daily.    digoxin (LANOXIN) 125 mcg tablet Take 1 tablet (0.125 mg total) by mouth once daily.    furosemide (LASIX) 40 MG tablet Take 1 tablet (40 mg total) by mouth once daily.    melatonin (MELATIN) 3 mg tablet Take 2 tablets (6 mg total) by mouth nightly.    nitroGLYCERIN (NITROSTAT) 0.4 MG SL tablet Place 1 tablet (0.4 mg total) under the tongue every 5 (five) minutes as needed for Chest pain.  If chest pain is not relieved or worsens 3 to 5 minutes after 1 dose, call 911 and seek immediate emergency medical attention.     Family History       Problem Relation (Age of Onset)    Alcohol abuse Father    Hypertension Mother, Paternal  Aunt, Paternal Grandmother, Paternal Grandfather    No Known Problems Sister, Daughter, Son          Tobacco Use    Smoking status: Never    Smokeless tobacco: Never   Substance and Sexual Activity    Alcohol use: Not Currently     Comment: no alcohol for 6 months    Drug use: Never    Sexual activity: Not Currently     Review of Systems   Constitutional:  Negative for chills and fever.   HENT:  Negative for congestion and sore throat.    Eyes:  Negative for photophobia and visual disturbance.   Respiratory:  Negative for cough and shortness of breath.    Cardiovascular:  Positive for chest pain. Negative for palpitations.   Gastrointestinal:  Negative for abdominal pain.   Genitourinary:  Positive for dysuria and penile pain. Negative for hematuria, penile discharge and penile swelling.   Musculoskeletal:  Negative for arthralgias and back pain.   Skin:  Negative for rash and wound.   Neurological:  Negative for dizziness and syncope.   Psychiatric/Behavioral:  Positive for confusion and decreased concentration. Negative for agitation and behavioral problems.      Objective:     Vital Signs (Most Recent):  Temp: 98.1 °F (36.7 °C) (10/19/23 2034)  Pulse: 83 (10/19/23 2147)  Resp: 14 (10/19/23 2147)  BP: 118/84 (10/19/23 2147)  SpO2: 97 % (10/19/23 2034) Vital Signs (24h Range):  Temp:  [98 °F (36.7 °C)-98.5 °F (36.9 °C)] 98.1 °F (36.7 °C)  Pulse:  [77-89] 83  Resp:  [14-20] 14  SpO2:  [97 %-100 %] 97 %  BP: (110-153)/() 118/84        There is no height or weight on file to calculate BMI.     Physical Exam  Vitals reviewed.   Constitutional:       General: He is not in acute distress.     Appearance: Normal appearance.   HENT:      Head: Normocephalic and atraumatic.      Nose: No congestion or rhinorrhea.      Mouth/Throat:      Mouth: Mucous membranes are moist.      Pharynx: Oropharynx is clear.   Cardiovascular:      Rate and Rhythm: Normal rate and regular rhythm.      Pulses: Normal pulses.      Heart  sounds: Normal heart sounds.   Pulmonary:      Effort: Pulmonary effort is normal. No respiratory distress.      Breath sounds: Rales present.   Abdominal:      General: Bowel sounds are normal.      Palpations: Abdomen is soft.      Tenderness: There is no abdominal tenderness.   Genitourinary:     Penis: Normal.       Comments: High in place, draining yellow urine. No blood, discharge, or skin breakdown noted at the meatus. No erythema.  Musculoskeletal:         General: No swelling or tenderness.      Cervical back: Full passive range of motion without pain and normal range of motion.      Right lower leg: Edema present.   Skin:     General: Skin is warm and dry.   Neurological:      General: No focal deficit present.      Mental Status: He is alert. Mental status is at baseline. He is disoriented.   Psychiatric:         Mood and Affect: Mood normal.         Behavior: Behavior normal.                Significant Labs: All pertinent labs within the past 24 hours have been reviewed.    Significant Imaging: I have reviewed all pertinent imaging results/findings within the past 24 hours.

## 2023-10-20 NOTE — ED NOTES
Assumed care of pt at this time.    Pt AAOx4, resting comfortably in bed, NAD, respirations E/UL, updated on POC, wheels locked and in low position, call bell with in reach, Comfort positioning and restroom needs were addressed. Necessary items were placed with in reach and was advised when a reassessment would take place.

## 2023-10-20 NOTE — ED PROVIDER NOTES
Encounter Date: 10/19/2023       History     Chief Complaint   Patient presents with    Chest Pain     Midsternal CP that started earlier today rated 10/10. Just discharged this morning, told to come back to ED d/t bacteremia     82 yo M with pmhx CAD, advanced CHF (10%, Grade III DD) enrolled in hospice, HTN, CVA presents with chief complaint of bacteremia.  Patient was seen in the ER yesterday evening for flank pain and found to have a UTI.  Plan was to admit but as patient is enrolled in hospice, through shared decision-making, patient was discharged home on antibiotics.  Since that time both of his blood cultures have returned positive for Gram-negative rods.  He arrived here for IV antibiotics.  He is accompanied by his son and his daughter.  He is comfortable being admitted for IV antibiotics.  No fevers.  He reports feeling well.  He did have substernal chest pain earlier today.  It was 10/10 in severity.  Not present currently.  It was not exertional.  Labs from earlier today revealed a leukocytosis, elevated troponin, elevated BNP.  Lactic acid was 2 and repeat down trended.        Review of patient's allergies indicates:  No Known Allergies  Past Medical History:   Diagnosis Date    Hypertension     Stroke      No past surgical history on file.  Family History   Problem Relation Age of Onset    Hypertension Mother     Alcohol abuse Father     No Known Problems Sister     Hypertension Paternal Aunt     Hypertension Paternal Grandmother     Hypertension Paternal Grandfather     No Known Problems Daughter     No Known Problems Son     Cataracts Neg Hx     Glaucoma Neg Hx     Macular degeneration Neg Hx      Social History     Tobacco Use    Smoking status: Never    Smokeless tobacco: Never   Substance Use Topics    Alcohol use: Not Currently     Comment: no alcohol for 6 months    Drug use: Never     Review of Systems    Physical Exam     Initial Vitals [10/19/23 1904]   BP Pulse Resp Temp SpO2   110/69 89 20  "98 °F (36.7 °C) 97 %      MAP       --         Physical Exam    Nursing note and vitals reviewed.  Constitutional: He appears well-developed and well-nourished. He is not diaphoretic. No distress.   HENT:   Head: Normocephalic and atraumatic.   Eyes: Right eye exhibits no discharge. Left eye exhibits no discharge. No scleral icterus.   Neck: Neck supple.   Normal range of motion.  Cardiovascular:  Normal rate and regular rhythm.     Exam reveals no gallop and no friction rub.       Murmur heard.  Pulmonary/Chest: Breath sounds normal. No respiratory distress. He has no wheezes. He has no rhonchi. He has no rales.   Abdominal: Abdomen is soft. He exhibits no distension and no mass. There is no abdominal tenderness. There is no rebound and no guarding.   Musculoskeletal:         General: No tenderness. Normal range of motion.      Cervical back: Normal range of motion and neck supple.     Neurological: He is alert. He has normal strength. No sensory deficit.   Oriented x2, reports year as "2023"   Skin: Skin is warm and dry. Capillary refill takes less than 2 seconds.   Psychiatric: Cognition and memory are impaired.         ED Course   Procedures  Labs Reviewed   CBC W/ AUTO DIFFERENTIAL - Abnormal; Notable for the following components:       Result Value    WBC 19.66 (*)     RBC 3.73 (*)     Hemoglobin 10.5 (*)     Hematocrit 34.3 (*)     MCHC 30.6 (*)     RDW 14.6 (*)     Immature Granulocytes 0.6 (*)     Gran # (ANC) 16.6 (*)     Immature Grans (Abs) 0.11 (*)     Mono # 1.6 (*)     Gran % 84.4 (*)     Lymph % 6.7 (*)     All other components within normal limits   CULTURE, BLOOD   CULTURE, BLOOD   CULTURE, BLOOD   CULTURE, BLOOD   COMPREHENSIVE METABOLIC PANEL   TROPONIN I   B-TYPE NATRIURETIC PEPTIDE   LACTIC ACID, PLASMA   URINALYSIS, REFLEX TO URINE CULTURE   MAGNESIUM   POCT TROPONIN   POCT TROPONIN     EKG Readings: (Independently Interpreted)   Initial Reading: No STEMI. Rhythm: Sinus Arrhythmia. Heart Rate: " 79. Ectopy: PVCs. ST Segments: Normal ST Segments. T Waves: Normal. Axis: Normal.       Imaging Results              X-Ray Chest AP Portable (In process)  Result time 10/19/23 20:33:37                     Medications   cefTRIAXone (ROCEPHIN) 2 g in dextrose 5 % in water (D5W) 100 mL IVPB (MB+) (has no administration in time range)     Medical Decision Making  82 yo M with pmhx CAD, advanced CHF (10%, Grade III DD) enrolled in hospice, HTN, CVA presents with chief complaint of bacteremia, with episode of chest pain earlier.     My differential includes, does not limited to: Bacteremia, sepsis, ACS, CHF    Patient asymptomatic currently.  No sirs criteria will administer ceftriaxone IV and admit.    Reassessment:  CBC with worsening leukocytosis 19.66.  Hemoglobin down to 10.5 but within patient's previous range.  Remainder of labs pending. No sepsis bolus as pt has advanced heart failure. Hemodynamics stable.  Will admit.    Amount and/or Complexity of Data Reviewed  Labs: ordered.    Risk  Decision regarding hospitalization.                               Clinical Impression:   Final diagnoses:  [R07.9] Chest pain  [R78.81] Bacteremia (Primary)        ED Disposition Condition    Admit Stable                Griffin Martinez MD  10/19/23 2039

## 2023-10-20 NOTE — ASSESSMENT & PLAN NOTE
On Entresto and Coreg at home.    - Holding in setting of GNR bacteremia, can resume if pt remains HDS overnight.

## 2023-10-20 NOTE — ASSESSMENT & PLAN NOTE
Dilated cardiomyopathy    History of severe HF with EF 10-15%. CXR showing some pulmonary vascular congestion, BNP was markedly elevated on exam. Pt not SOB or hypoxemic, however exam does reflect some mild rales and some ankle edema, worse on the Right. Previously on Lasix 40mg PO in the past.    - Will diurese gently with IV Lasix 20mg  - Strict I/O's  - Will hold his Entresto and Coreg in the setting of bacteremia in a frail appearing elderly patient. If stable overnight, would resume in the morning

## 2023-10-20 NOTE — H&P
[facility] - Emergency Dept  [facility] Medicine  History & Physical    Patient Name: Gregorio Bishop  MRN: [id number]  Patient Class: IP- Inpatient  Admission Date: 10/19/2023  Attending Physician: Renaldo Monson MD   Primary Care Provider: Teresa Primary Doctor         Patient information was obtained from relative(s) and ER records.     Subjective:     Principal Problem:Bacteremia due to Gram-negative bacteria    Chief Complaint:   Chief Complaint   Patient presents with    Chest Pain     Midsternal CP that started earlier today rated 10/10. Just discharged this morning, told to come back to ED d/t bacteremia        HPI: Gregorio Bishop is an 83 year old man with history of HTN, CVA, HFrEF (EF 10-15%) on hospice, who re-presents to the ED due to blood cultures showing bacteremia. with weakness, altered mentation, and flank pain. Patient with his son and daughter at bedside. He initially presented on evening of 10/18 due to his adult children noticing that the patient had been more somnolent, confused, and complaining of left flank pain and suprapubic pain. Labs notable for WBC 14.92 and UA with positive nitrites and leukocytosis with many bacteria; was diagnosed with UTI and family was agreeable to discharge with oral antibiotics after shared decision-making discussion with ED physician. However, blood cultures from 10/18 returned positive for GNR, so patient's family was notified and returned for IV antibiotics. His children report that he has a history of UTIs in the past, and they tell me that the Goldstein had been placed about 4 months ago and has not been changed out. They deny seeing any blood clots or hematuria in the goldstein container and the goldstein was exchanged on the last ED visit yesterday.    In the ED tonight on 10/19, patient is afebrile, hemodynamically stable, and stable on room air. Labs again notable for leukocytosis, WBC 19.66. UA pending. CXR with some pulmonary vascular congestion. CT head showed new  hypoattenuation involving more anterior aspects of the left frontal lobe, in keeping likely remote ischemia. A small right cerebellar infarct was also noticed, but age-indeterminate. Pt given IV CTX and fluids were deferred in setting of HF with EF 10% and hemodynamic stability. On ROS, patient's ability to provide history is limited by his dementia, however he gestures to his penis when asked about where he is having pain. No other complaints elicited and family does not report noticing any other issues outside of presenting complaints. Exam relatively unremarkable with some crackles in lungs and ankle swelling. Patient with some pain at the urethral opening, but no blood, discharge, or skin breakdown noted at meatus. Family are agreeable with admission for IV antibiotics, and pt admitted to Hospital Medicine for IV treatment of GNR bacteremia.      Past Medical History:   Diagnosis Date    Hypertension     Stroke        No past surgical history on file.    Review of patient's allergies indicates:  No Known Allergies    Current Facility-Administered Medications on File Prior to Encounter   Medication    [COMPLETED] acetaminophen tablet 1,000 mg    [COMPLETED] cefTRIAXone (ROCEPHIN) 2 g in dextrose 5 % in water (D5W) 100 mL IVPB (MB+)    [DISCONTINUED] sodium chloride 0.9% bolus 500 mL 500 mL     Current Outpatient Medications on File Prior to Encounter   Medication Sig    atorvastatin (LIPITOR) 80 MG tablet Take 1 tablet (80 mg total) by mouth every evening.    carvediloL (COREG) 3.125 MG tablet Take 1 tablet (3.125 mg total) by mouth 2 (two) times daily.    cephALEXin (KEFLEX) 500 MG capsule Take 1 capsule (500 mg total) by mouth 4 (four) times daily. for 10 days    sacubitriL-valsartan (ENTRESTO) 24-26 mg per tablet Take 1 tablet by mouth 2 (two) times daily.    ticagrelor (BRILINTA) 90 mg tablet Take 1 tablet (90 mg total) by mouth 2 (two) times daily.    acetaminophen (TYLENOL) 325 MG tablet Take 650  mg by mouth every 8 (eight) hours as needed.    aspirin (ECOTRIN) 81 MG EC tablet Take 1 tablet (81 mg total) by mouth once daily.    cyanocobalamin (VITAMIN B-12) 1000 MCG tablet Take 1 tablet (1,000 mcg total) by mouth once daily.    digoxin (LANOXIN) 125 mcg tablet Take 1 tablet (0.125 mg total) by mouth once daily.    furosemide (LASIX) 40 MG tablet Take 1 tablet (40 mg total) by mouth once daily.    melatonin (MELATIN) 3 mg tablet Take 2 tablets (6 mg total) by mouth nightly.    nitroGLYCERIN (NITROSTAT) 0.4 MG SL tablet Place 1 tablet (0.4 mg total) under the tongue every 5 (five) minutes as needed for Chest pain.  If chest pain is not relieved or worsens 3 to 5 minutes after 1 dose, call 911 and seek immediate emergency medical attention.     Family History       Problem Relation (Age of Onset)    Alcohol abuse Father    Hypertension Mother, Paternal Aunt, Paternal Grandmother, Paternal Grandfather    No Known Problems Sister, Daughter, Son          Tobacco Use    Smoking status: Never    Smokeless tobacco: Never   Substance and Sexual Activity    Alcohol use: Not Currently     Comment: no alcohol for 6 months    Drug use: Never    Sexual activity: Not Currently     Review of Systems   Constitutional:  Negative for chills and fever.   HENT:  Negative for congestion and sore throat.    Eyes:  Negative for photophobia and visual disturbance.   Respiratory:  Negative for cough and shortness of breath.    Cardiovascular:  Positive for chest pain. Negative for palpitations.   Gastrointestinal:  Negative for abdominal pain.   Genitourinary:  Positive for dysuria and penile pain. Negative for hematuria, penile discharge and penile swelling.   Musculoskeletal:  Negative for arthralgias and back pain.   Skin:  Negative for rash and wound.   Neurological:  Negative for dizziness and syncope.   Psychiatric/Behavioral:  Positive for confusion and decreased concentration. Negative for agitation and behavioral  problems.      Objective:     Vital Signs (Most Recent):  Temp: 98.1 °F (36.7 °C) (10/19/23 2034)  Pulse: 83 (10/19/23 2147)  Resp: 14 (10/19/23 2147)  BP: 118/84 (10/19/23 2147)  SpO2: 97 % (10/19/23 2034) Vital Signs (24h Range):  Temp:  [98 °F (36.7 °C)-98.5 °F (36.9 °C)] 98.1 °F (36.7 °C)  Pulse:  [77-89] 83  Resp:  [14-20] 14  SpO2:  [97 %-100 %] 97 %  BP: (110-153)/() 118/84        There is no height or weight on file to calculate BMI.     Physical Exam  Vitals reviewed.   Constitutional:       General: He is not in acute distress.     Appearance: Normal appearance.   HENT:      Head: Normocephalic and atraumatic.      Nose: No congestion or rhinorrhea.      Mouth/Throat:      Mouth: Mucous membranes are moist.      Pharynx: Oropharynx is clear.   Cardiovascular:      Rate and Rhythm: Normal rate and regular rhythm.      Pulses: Normal pulses.      Heart sounds: Normal heart sounds.   Pulmonary:      Effort: Pulmonary effort is normal. No respiratory distress.      Breath sounds: Rales present.   Abdominal:      General: Bowel sounds are normal.      Palpations: Abdomen is soft.      Tenderness: There is no abdominal tenderness.   Genitourinary:     Penis: Normal.       Comments: High in place, draining yellow urine. No blood, discharge, or skin breakdown noted at the meatus. No erythema.  Musculoskeletal:         General: No swelling or tenderness.      Cervical back: Full passive range of motion without pain and normal range of motion.      Right lower leg: Edema present.   Skin:     General: Skin is warm and dry.   Neurological:      General: No focal deficit present.      Mental Status: He is alert. Mental status is at baseline. He is disoriented.   Psychiatric:         Mood and Affect: Mood normal.         Behavior: Behavior normal.                Significant Labs: All pertinent labs within the past 24 hours have been reviewed.    Significant Imaging: I have reviewed all pertinent imaging  results/findings within the past 24 hours.    Assessment/Plan:     * Bacteremia due to Gram-negative bacteria  Urinary tract infection associated with indwelling urethral catheter    83M with hx of CAD, advanced heart failure (10% EF, Grade III DD), HTN, CVA presenting from hospice with GNR bacteremia on blood cultures obtained the day prior in the ED. Initially presenting with confusion and increased somnolence, workup in the ED on 10/18 revealed UTI and pt was discharged with PO antibiotics until blood cultures resulted with GNR as well. Other labs with evidence of elevated BNP and troponin as well as WBC count. Lactic acid was normal. Patient well-appearing on my evaluation, but did report penile pain due to catheter. Otherwise denies any other symptoms. Exam notable for mild crackles in lung fields and some mild pitting edema on the Right ankle. Penile exam without any acute abnormalities, but pt reporting pain in the urethra at catheter site. Exam unremarkable otherwise. Patient likely bacteremic from UTI diagnosed on UA and clinically. Pt currently stable.    Plan:  - Continue IV Ceftriaxone 2g q24h for GNR bacteremia  - Follow-up blood and urine cultures to tailor antibiotics  - Consider imaging if no response to treatment or worsening symptoms  - High replaced for condom cathether    History of stroke  History of stroke on 12/2022 that was also visualized on recent brain imaging; namely left frontal lobe infarcts. Patient has RSW residually from the stroke. Neuro exam is stable and unconcerning for new focal deficits. Was converted to monotherapy with Brilinta.    - Continue home Brilinta    Chronic combined systolic and diastolic heart failure  Dilated cardiomyopathy    History of severe HF with EF 10-15%. CXR showing some pulmonary vascular congestion, BNP was markedly elevated on exam. Pt not SOB or hypoxemic, however exam does reflect some mild rales and some ankle edema, worse on the Right. Previously  on Lasix 40mg PO in the past.    - Will diurese gently with IV Lasix 20mg  - Strict I/O's  - Will hold his Entresto and Coreg in the setting of bacteremia in a frail appearing elderly patient. If stable overnight, would resume in the morning    Essential hypertension  On Entresto and Coreg at home.    - Holding in setting of GNR bacteremia, can resume if pt remains HDS overnight.      VTE Risk Mitigation (From admission, onward)         Ordered     enoxaparin injection 40 mg  Daily         10/19/23 2239     IP VTE HIGH RISK PATIENT  Once         10/19/23 2239     Place sequential compression device  Until discontinued         10/19/23 2239                       Jasper Denis MD  Department of Hospital Medicine  [facility] - Emergency Dept

## 2023-10-20 NOTE — ED NOTES
Pt found having jumped out of bed with goldstein still in place but causing pain. Pt helped back into bed and goldstein reassess, deflated, further inserted and inflated with appropriate drainage

## 2023-10-20 NOTE — ASSESSMENT & PLAN NOTE
History of stroke on 12/2022 that was also visualized on recent brain imaging; namely left frontal lobe infarcts. Patient has RSW residually from the stroke. Neuro exam is stable and unconcerning for new focal deficits. Was converted to monotherapy with Brilinta.    - Continue home Brilinta

## 2023-10-21 VITALS
HEART RATE: 76 BPM | OXYGEN SATURATION: 97 % | TEMPERATURE: 98 F | RESPIRATION RATE: 16 BRPM | DIASTOLIC BLOOD PRESSURE: 76 MMHG | WEIGHT: 129 LBS | BODY MASS INDEX: 20.2 KG/M2 | SYSTOLIC BLOOD PRESSURE: 123 MMHG

## 2023-10-21 LAB
ALBUMIN SERPL BCP-MCNC: 3 G/DL (ref 3.5–5.2)
ALP SERPL-CCNC: 68 U/L (ref 55–135)
ALT SERPL W/O P-5'-P-CCNC: 24 U/L (ref 10–44)
ANION GAP SERPL CALC-SCNC: 11 MMOL/L (ref 8–16)
AST SERPL-CCNC: 33 U/L (ref 10–40)
BACTERIA UR CULT: NORMAL
BASOPHILS # BLD AUTO: 0.03 K/UL (ref 0–0.2)
BASOPHILS NFR BLD: 0.2 % (ref 0–1.9)
BILIRUB SERPL-MCNC: 0.8 MG/DL (ref 0.1–1)
BUN SERPL-MCNC: 27 MG/DL (ref 8–23)
CALCIUM SERPL-MCNC: 9.1 MG/DL (ref 8.7–10.5)
CHLORIDE SERPL-SCNC: 105 MMOL/L (ref 95–110)
CO2 SERPL-SCNC: 25 MMOL/L (ref 23–29)
CREAT SERPL-MCNC: 1 MG/DL (ref 0.5–1.4)
DIFFERENTIAL METHOD: ABNORMAL
EOSINOPHIL # BLD AUTO: 0 K/UL (ref 0–0.5)
EOSINOPHIL NFR BLD: 0 % (ref 0–8)
ERYTHROCYTE [DISTWIDTH] IN BLOOD BY AUTOMATED COUNT: 13.1 % (ref 11.5–14.5)
EST. GFR  (NO RACE VARIABLE): >60 ML/MIN/1.73 M^2
GLUCOSE SERPL-MCNC: 127 MG/DL (ref 70–110)
HCT VFR BLD AUTO: 35 % (ref 40–54)
HGB BLD-MCNC: 10.9 G/DL (ref 14–18)
IMM GRANULOCYTES # BLD AUTO: 0.07 K/UL (ref 0–0.04)
IMM GRANULOCYTES NFR BLD AUTO: 0.5 % (ref 0–0.5)
LYMPHOCYTES # BLD AUTO: 1.8 K/UL (ref 1–4.8)
LYMPHOCYTES NFR BLD: 12.2 % (ref 18–48)
MAGNESIUM SERPL-MCNC: 2.1 MG/DL (ref 1.6–2.6)
MCH RBC QN AUTO: 28.5 PG (ref 27–31)
MCHC RBC AUTO-ENTMCNC: 31.1 G/DL (ref 32–36)
MCV RBC AUTO: 91 FL (ref 82–98)
MONOCYTES # BLD AUTO: 1.4 K/UL (ref 0.3–1)
MONOCYTES NFR BLD: 9.8 % (ref 4–15)
NEUTROPHILS # BLD AUTO: 11.1 K/UL (ref 1.8–7.7)
NEUTROPHILS NFR BLD: 77.3 % (ref 38–73)
NRBC BLD-RTO: 0 /100 WBC
PHOSPHATE SERPL-MCNC: 3.1 MG/DL (ref 2.7–4.5)
PLATELET # BLD AUTO: 281 K/UL (ref 150–450)
PMV BLD AUTO: 10.3 FL (ref 9.2–12.9)
POTASSIUM SERPL-SCNC: 4 MMOL/L (ref 3.5–5.1)
PROT SERPL-MCNC: 6.8 G/DL (ref 6–8.4)
RBC # BLD AUTO: 3.83 M/UL (ref 4.6–6.2)
SODIUM SERPL-SCNC: 141 MMOL/L (ref 136–145)
WBC # BLD AUTO: 14.36 K/UL (ref 3.9–12.7)

## 2023-10-21 PROCEDURE — 85025 COMPLETE CBC W/AUTO DIFF WBC: CPT

## 2023-10-21 PROCEDURE — 94761 N-INVAS EAR/PLS OXIMETRY MLT: CPT

## 2023-10-21 PROCEDURE — 84100 ASSAY OF PHOSPHORUS: CPT

## 2023-10-21 PROCEDURE — 25000003 PHARM REV CODE 250

## 2023-10-21 PROCEDURE — 80053 COMPREHEN METABOLIC PANEL: CPT

## 2023-10-21 PROCEDURE — 83735 ASSAY OF MAGNESIUM: CPT

## 2023-10-21 PROCEDURE — 36415 COLL VENOUS BLD VENIPUNCTURE: CPT

## 2023-10-21 RX ORDER — CIPROFLOXACIN 500 MG/1
500 TABLET ORAL EVERY 12 HOURS
Qty: 9 TABLET | Refills: 0 | Status: SHIPPED | OUTPATIENT
Start: 2023-10-21

## 2023-10-21 RX ORDER — CIPROFLOXACIN 500 MG/1
500 TABLET ORAL EVERY 12 HOURS
Status: DISCONTINUED | OUTPATIENT
Start: 2023-10-21 | End: 2023-10-21 | Stop reason: HOSPADM

## 2023-10-21 RX ORDER — QUETIAPINE FUMARATE 25 MG/1
25 TABLET, FILM COATED ORAL ONCE
Status: DISCONTINUED | OUTPATIENT
Start: 2023-10-21 | End: 2023-10-21

## 2023-10-21 RX ORDER — CEFPODOXIME PROXETIL 200 MG/1
200 TABLET, FILM COATED ORAL EVERY 12 HOURS
Status: DISCONTINUED | OUTPATIENT
Start: 2023-10-21 | End: 2023-10-21

## 2023-10-21 RX ORDER — QUETIAPINE FUMARATE 25 MG/1
25 TABLET, FILM COATED ORAL ONCE
Status: COMPLETED | OUTPATIENT
Start: 2023-10-21 | End: 2023-10-21

## 2023-10-21 RX ADMIN — TICAGRELOR 90 MG: 90 TABLET ORAL at 08:10

## 2023-10-21 RX ADMIN — QUETIAPINE FUMARATE 25 MG: 25 TABLET ORAL at 12:10

## 2023-10-21 RX ADMIN — CIPROFLOXACIN 500 MG: 500 TABLET, FILM COATED ORAL at 08:10

## 2023-10-21 RX ADMIN — ACETAMINOPHEN 650 MG: 325 TABLET ORAL at 12:10

## 2023-10-21 RX ADMIN — Medication 6 MG: at 12:10

## 2023-10-21 NOTE — DISCHARGE SUMMARY
[facility] Medicine  Discharge Summary      Patient Name: Gregorio Bishop  MRN: [id number]  VERONICA: [id number]  Patient Class: IP- Inpatient  Admission Date: 10/19/2023  Hospital Length of Stay: 2 days  Discharge Date and Time:  10/21/2023 12:40 PM  Attending Physician: Renaldo Monson MD   Discharging Provider: Jean Paul Spencer DO  Primary Care Provider: Teresa Primary Doctor  [facility] Medicine Team: [facility] 4 Jean Paul Spencer DO  Primary Care Team: [facility] 4    HPI:   Gregorio Bishop is an 83 year old man with history of HTN, CVA, HFrEF (EF 10-15%) on hospice, who re-presents to the ED due to blood cultures showing bacteremia. with weakness, altered mentation, and flank pain. Patient with his son and daughter at bedside. He initially presented on evening of 10/18 due to his adult children noticing that the patient had been more somnolent, confused, and complaining of left flank pain and suprapubic pain. Labs notable for WBC 14.92 and UA with positive nitrites and leukocytosis with many bacteria; was diagnosed with UTI and family was agreeable to discharge with oral antibiotics after shared decision-making discussion with ED physician. However, blood cultures from 10/18 returned positive for GNR, so patient's family was notified and returned for IV antibiotics. His children report that he has a history of UTIs in the past, and they tell me that the Goldstein had been placed about 4 months ago and has not been changed out. They deny seeing any blood clots or hematuria in the goldstein container and the goldstein was exchanged on the last ED visit yesterday.    In the ED tonight on 10/19, patient is afebrile, hemodynamically stable, and stable on room air. Labs again notable for leukocytosis, WBC 19.66. UA pending. CXR with some pulmonary vascular congestion. CT head showed new hypoattenuation involving more anterior aspects of the left frontal lobe, in keeping likely remote ischemia. A small right cerebellar  infarct was also noticed, but age-indeterminate. Pt given IV CTX and fluids were deferred in setting of HF with EF 10% and hemodynamic stability. On ROS, patient's ability to provide history is limited by his dementia, however he gestures to his penis when asked about where he is having pain. No other complaints elicited and family does not report noticing any other issues outside of presenting complaints. Exam relatively unremarkable with some crackles in lungs and ankle swelling. Patient with some pain at the urethral opening, but no blood, discharge, or skin breakdown noted at meatus. Family are agreeable with admission for IV antibiotics, and pt admitted to Hospital Medicine for IV treatment of GNR bacteremia.      * No surgery found *      Hospital Course:   Pt presented from home hospice with concern for a urinary tract infection. Pt had been started as an outpt on keflex, but patient continued to have symptoms and some confusion. Pt was started on rocephin and received a dose of gentamicin in case of resistance. Cultures from prior recent hospital stay resulted for E. Coli susceptible to cipro. Pt will complete a 7 day antibiotic course, finishing with ciprofloxacin 500mg BID. Discharging home on 10/21       Goals of Care Treatment Preferences:  Code Status: DNR      Consults:     Renal/  Urinary tract infection associated with indwelling urethral catheter  Pt was started on Rocephin during hospital stay. Prior susceptibilities came back for E coli susceptible to cipro. Will complete a 7 day course of antibiotics.          Final Active Diagnoses:    Diagnosis Date Noted POA    PRINCIPAL PROBLEM:  E. 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    Significant Diagnostic Studies: Labs:   CMP   Recent Labs   Lab 10/19/23  2010 10/20/23  0416 10/21/23  0517    139 141   K 4.2 5.0 4.0    105 105   CO2 24 23 25   * 122* 127*   BUN 22 24* 27*   CREATININE 1.0 1.0 1.0   CALCIUM 9.2 9.1 9.1   PROT 7.3 7.3 6.8   ALBUMIN 3.3* 3.2* 3.0*   BILITOT 0.6 0.8 0.8   ALKPHOS 64 60 68   AST 16 26 33   ALT 10 11 24   ANIONGAP 11 11 11    and CBC   Recent Labs   Lab 10/19/23  2010 10/20/23  0416 10/21/23  0517   WBC 19.66* 18.18* 14.36*   HGB 10.5* 10.9* 10.9*   HCT 34.3* 34.7* 35.0*    241 281       Pending Diagnostic Studies:     None         Medications:  Reconciled Home Medications:      Medication List      START taking these medications    ciprofloxacin HCl 500 MG tablet  Commonly known as: CIPRO  Take 1 tablet (500 mg total) by mouth every 12 (twelve) hours.        CONTINUE taking these medications    acetaminophen 325 MG tablet  Commonly known as: TYLENOL  Take 650 mg by mouth every 8 (eight) hours as needed.     aspirin 81 MG EC tablet  Commonly known as: ECOTRIN  Take 1 tablet (81 mg total) by mouth once daily.     atorvastatin 80 MG tablet  Commonly known as: LIPITOR  Take 1 tablet (80 mg total) by mouth every evening.     carvediloL 3.125 MG tablet  Commonly known as: COREG  Take 1 tablet (3.125 mg total) by mouth 2 (two) times daily.     cyanocobalamin 1000 MCG tablet  Commonly known as: VITAMIN B-12  Take 1 tablet (1,000 mcg total) by mouth once daily.     digoxin 125 mcg tablet  Commonly known as: LANOXIN  Take 1  tablet (0.125 mg total) by mouth once daily.     ENTRESTO 24-26 mg per tablet  Generic drug: sacubitriL-valsartan  Take 1 tablet by mouth 2 (two) times daily.     furosemide 40 MG tablet  Commonly known as: LASIX  Take 1 tablet (40 mg total) by mouth once daily.     melatonin 3 mg tablet  Commonly known as: MELATIN  Take 2 tablets (6 mg total) by mouth nightly.     nitroGLYCERIN 0.4 MG SL tablet  Commonly known as: NITROSTAT  Place 1 tablet (0.4 mg total) under the tongue every 5 (five) minutes as needed for Chest pain.  If chest pain is not relieved or worsens 3 to 5 minutes after 1 dose, call 911 and seek immediate emergency medical attention.     ticagrelor 90 mg tablet  Commonly known as: BRILINTA  Take 1 tablet (90 mg total) by mouth 2 (two) times daily.        STOP taking these medications    cephALEXin 500 MG capsule  Commonly known as: KEFLEX            Indwelling Lines/Drains at time of discharge:   Lines/Drains/Airways     Drain  Duration           Male External Urinary Catheter 10/20/23 7145 <1 day                Time spent on the discharge of patient: 35 minutes         Jean Paul Spencer DO  Department of Hospital Medicine  [facility] Surg

## 2023-10-21 NOTE — NURSING
AVS/DC instructions given to patient and family with all questions/concerns addressed. PIV removed. Awaiting bedside RX delivery. POC on going.

## 2023-10-21 NOTE — NURSING
Nurses Note -- 4 Eyes      10/20/2023   11:52 PM      Skin assessed during: Admit      [x] No Altered Skin Integrity Present    [x]Prevention Measures Documented      [] Yes- Altered Skin Integrity Present or Discovered   [] LDA Added if Not in Epic (Describe Wound)   [] New Altered Skin Integrity was Present on Admit and Documented in LDA   [] Wound Image Taken    Wound Care Consulted? No    Attending Nurse:  Kayode Conklin RN/Staff Member:   KELY Barroso

## 2023-10-21 NOTE — HOSPITAL COURSE
Pt presented from home hospice with concern for a urinary tract infection. Pt had been started as an outpt on keflex, but patient continued to have symptoms and some confusion. Pt was started on rocephin and received a dose of gentamicin in case of resistance. Cultures from prior recent hospital stay resulted for E. Coli susceptible to cipro. Pt will complete a 7 day antibiotic course, finishing with ciprofloxacin 500mg BID. Discharging home on 10/21

## 2023-10-21 NOTE — ASSESSMENT & PLAN NOTE
Pt was started on Rocephin during hospital stay. Prior susceptibilities came back for E coli susceptible to cipro. Will complete a 7 day course of antibiotics.

## 2023-10-22 NOTE — PLAN OF CARE
[facility] - Med Surg  Discharge Final Note    Primary Care Provider: No, Primary Doctor    Expected Discharge Date: 10/21/2023    Final Discharge Note (most recent)       Final Note - 10/22/23 0713          Final Note    Assessment Type Final Discharge Note     Anticipated Discharge Disposition Hospice/Home        Post-Acute Status    Post-Acute Authorization Hospice     Coverage MEDICARE - MEDICARE PART A & B                     Important Message from Medicare

## 2023-10-22 NOTE — PLAN OF CARE
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]  Palliative care status [Z51.5]  Urinary tract infection associated with indwelling urethral catheter, initial encounter [T83.511A, N39.0]    Admission Date: 10/19/2023  Expected Discharge Date: 10/21/2023    Transition of Care Barriers: None    Payor: MEDICARE / Plan: MEDICARE PART A & B / Product Type: Government /     Extended Emergency Contact Information  Primary Emergency Contact: Riana Bishop  Mobile Phone: [phone]  Relation: Daughter  Secondary Emergency Contact: Kel Bishop  Address: [address] [place] [address]  Home Phone: [phone]  Mobile Phone: [phone]  Relation: Son    Discharge Plan A: Home with family, Hospice/home  Discharge Plan B: Home with family, Hospice/home      [id number] [place], [place] - [address]  [address] [facility] [address]  Phone: [phone] Fax: [phone]    [id number] - [place], [place] - [address]  [address]  [facility] [address]  Phone: [phone] Fax: [phone]    [facility]  [address]  [place] [address]  Phone: [phone] Fax: [phone]    [id number] [place], [place] - [address] AT [address]  [address]  [facility] [address]  Phone: [phone] Fax: [phone]    CM at bedside to discuss discharge planning assessment.   Patient lives with his  family in a raised one-story home.  He was receiving home hospice with [facility] prior to hospital admit.   Explained CM services during patient's stay in hospital.   My Health packet discussed and left with patient.     Initial Assessment (most recent)       Adult Discharge Assessment - 10/21/23 1205          Discharge Assessment    Assessment Type Discharge Planning Assessment     Confirmed/corrected address, phone number and insurance Yes     Confirmed Demographics Correct on Facesheet     Source of Information patient;family     Reason For Admission bacteremia     People in Home spouse;child(wesly), adult     Facility Arrived From: home     Do you expect to return to your current living situation? Yes     Do you have help at home or someone to help you manage your care at home? Yes     Walking or Climbing Stairs ambulation difficulty, requires equipment     Mobility Management rolling walker, wheelchair     Dressing/Bathing bathing difficulty, assistance 1 person     Home Accessibility stairs to enter home     Equipment Currently Used at Home walker, rolling;bedside commode;shower chair;wheelchair     Readmission within 30 days? No     Patient currently being followed by outpatient case management? Unable to determine (comments)     Do you currently have service(s) that help you manage your care at home? Yes     Name and Contact number of agency [facility]     Is the pt/caregiver preference to resume services with current agency Yes     Do you take prescription medications? Yes     Do you have prescription coverage? Yes     Coverage MEDICARE - MEDICARE PART A & B     Do you have any problems affording any of your prescribed medications? No     Is the patient taking medications as prescribed? yes     How do you get to doctors appointments? family or friend will provide     Are you on dialysis? No     Do you take coumadin? No     DME Needed Upon Discharge  --   TBD    Discharge Plan discussed with:  Spouse/sig other;Adult children     Transition of Care Barriers None     Discharge Plan A Home with family;Hospice/home     Discharge Plan B Home with family;Hospice/home        Physical Activity    On average, how many days per week do you engage in moderate to strenuous exercise (like a brisk walk)? 0 days     On average, how many minutes do you engage in exercise at this level? 0 min        Financial Resource Strain    How hard is it for you to pay for the very basics like food, housing, medical care, and heating? Not hard at all        Housing Stability    In the last 12 months, was there a time when you were not able to pay the mortgage or rent on time? No     In the last 12 months, how many places have you lived? 1     In the last 12 months, was there a time when you did not have a steady place to sleep or slept in a shelter (including now)? No        Transportation Needs    In the past 12 months, has lack of transportation kept you from medical appointments or from getting medications? No     In the past 12 months, has lack of transportation kept you from meetings, work, or from getting things needed for daily living? No        Food Insecurity    Within the past 12 months, the food you bought just didn't last and you didn't have money to get more. Never true        Stress    Do you feel stress - tense, restless, nervous, or anxious, or unable to sleep at night because your mind is troubled all the time - these days? Rather much        Social Connections    In a typical week, how many times do you talk on the phone with family, friends, or neighbors? More than three times a week     How often do you get together with friends or relatives? Three times a week     How often do you attend Yarsani or Islam services? More than 4 times per year     Do you belong to any clubs or organizations such as Yarsani groups, unions, fraternal or athletic groups, or school groups? No     How often do you attend meetings of  the clubs or organizations you belong to? Never     Are you , , , , never , or living with a partner?         Alcohol Use    Q1: How often do you have a drink containing alcohol? Never     Q2: How many drinks containing alcohol do you have on a typical day when you are drinking? Patient does not drink     Q3: How often do you have six or more drinks on one occasion? Never

## 2023-10-23 ENCOUNTER — TELEPHONE (OUTPATIENT)
Dept: ADMINISTRATIVE | Facility: CLINIC | Age: 83
End: 2023-10-23
Payer: MEDICARE

## 2023-10-23 DIAGNOSIS — I50.42 CHRONIC COMBINED SYSTOLIC AND DIASTOLIC HEART FAILURE: ICD-10-CM

## 2023-10-23 DIAGNOSIS — I21.4 NSTEMI (NON-ST ELEVATED MYOCARDIAL INFARCTION): ICD-10-CM

## 2023-10-23 RX ORDER — SACUBITRIL AND VALSARTAN 24; 26 MG/1; MG/1
1 TABLET, FILM COATED ORAL 2 TIMES DAILY
Qty: 180 TABLET | Refills: 0 | Status: SHIPPED | OUTPATIENT
Start: 2023-10-23

## 2023-10-23 RX ORDER — CARVEDILOL 3.12 MG/1
3.12 TABLET ORAL 2 TIMES DAILY
Qty: 180 TABLET | Refills: 0 | Status: SHIPPED | OUTPATIENT
Start: 2023-10-23

## 2023-10-23 NOTE — PROGRESS NOTES
"Phoned patient in response to reply of "2" to post-discharge texting tracker. Mr. Bishop's daughter, Riana, states that he was told to continue taking his heart medications at home, but she states he is currently out of the Entresto, carvedilol and Brilinta. Mr. Bishop does not have a PCP to contact, therefore, sent a secure chat message to discharging provider. Provider sent a 3 months supply to [facility] on file for all 3 medications, with instructions for patient/caregiver to get any additional medications needed from Hospice provider. Left message on Ms. Mayers's voicemail.  "

## 2023-10-24 LAB
BACTERIA BLD CULT: NORMAL
BACTERIA BLD CULT: NORMAL

## 2023-10-25 ENCOUNTER — PATIENT OUTREACH (OUTPATIENT)
Dept: ADMINISTRATIVE | Facility: CLINIC | Age: 83
End: 2023-10-25
Payer: MEDICARE

## 2023-10-27 ENCOUNTER — NURSE TRIAGE (OUTPATIENT)
Dept: ADMINISTRATIVE | Facility: CLINIC | Age: 83
End: 2023-10-27
Payer: MEDICARE

## 2023-10-27 NOTE — TELEPHONE ENCOUNTER
Pt suffers from chronic heart failure and she wants to know what she can give him for his cough that does not interfere with his CHF. Cg also stated pts antibiotics have him delusional. Ciprofloxacin. Yesterday she didn't give him the antibiotic but she gave it to him today and it caused delusional. Talking out of his head and hollering, restless, c/o pain in penis but not when she dont give him the medication. Cg stated the symptoms last like and hour. This has been going on since the 21st of October. Care give stated pt is confused now. Care advice recommend pt go to Er. Cg verbalized understanding.   Reason for Disposition   Patient sounds very sick or weak to the triager    Additional Information   Negative: Difficult to awaken or acting confused (disoriented, slurred speech) and has diabetes mellitus   Negative: Difficult to awaken or acting confused (disoriented, slurred speech) and new-onset   Negative: Weakness of the face, arm, or leg on one side of the body and new-onset   Negative: Numbness of the face, arm, or leg on one side of the body and new-onset   Negative: Loss of speech or garbled speech and new-onset   Negative: Difficulty breathing and bluish (or gray) lips or face   Negative: Shock suspected (e.g., cold/pale/clammy skin, too weak to stand, low BP, rapid pulse)   Negative: Seeing or hearing or feeling things that are not there (i.e., auditory, visual, or tactile hallucinations)   Negative: Followed a head injury   Negative: Drug overdose suspected   Negative: Violent behavior, or threatening to physically hurt or kill someone   Negative: Sounds like a life-threatening emergency to the triager   Negative: Headache or vomiting   Negative: Stiff neck (can't touch chin to chest)   Negative: Very strange or paranoid behavior   Negative: Fever > 100.4 F (38.0 C)    Protocols used: Confusion - Delirium-A-OH

## 2023-10-29 ENCOUNTER — TELEPHONE (OUTPATIENT)
Dept: ADMINISTRATIVE | Facility: CLINIC | Age: 83
End: 2023-10-29
Payer: MEDICARE

## 2023-10-29 NOTE — TELEPHONE ENCOUNTER
3 attempts no answer from pt however I did leave my direct number and name for patient to return the call.

## 2023-12-22 DIAGNOSIS — I21.4 NSTEMI (NON-ST ELEVATED MYOCARDIAL INFARCTION): ICD-10-CM

## 2024-01-08 RX ORDER — NITROGLYCERIN 0.4 MG/1
0.4 TABLET SUBLINGUAL EVERY 5 MIN PRN
Qty: 25 TABLET | Refills: 0 | Status: SHIPPED | OUTPATIENT
Start: 2024-01-08 | End: 2025-01-07

## 2024-01-22 PROBLEM — T83.511A URINARY TRACT INFECTION ASSOCIATED WITH INDWELLING URETHRAL CATHETER: Status: RESOLVED | Noted: 2023-10-19 | Resolved: 2024-01-22

## 2024-01-22 PROBLEM — N39.0 URINARY TRACT INFECTION ASSOCIATED WITH INDWELLING URETHRAL CATHETER: Status: RESOLVED | Noted: 2023-10-19 | Resolved: 2024-01-22
